# Patient Record
Sex: MALE | Race: BLACK OR AFRICAN AMERICAN | NOT HISPANIC OR LATINO | Employment: FULL TIME | URBAN - METROPOLITAN AREA
[De-identification: names, ages, dates, MRNs, and addresses within clinical notes are randomized per-mention and may not be internally consistent; named-entity substitution may affect disease eponyms.]

---

## 2017-05-03 ENCOUNTER — HOSPITAL ENCOUNTER (EMERGENCY)
Facility: HOSPITAL | Age: 37
Discharge: LEFT BEFORE DISCHARGE | End: 2017-05-03

## 2017-05-03 ENCOUNTER — HOSPITAL ENCOUNTER (EMERGENCY)
Facility: HOSPITAL | Age: 37
Discharge: HOME | End: 2017-05-03
Attending: EMERGENCY MEDICINE | Admitting: EMERGENCY MEDICINE

## 2017-05-03 VITALS
HEIGHT: 68 IN | WEIGHT: 255 LBS | DIASTOLIC BLOOD PRESSURE: 101 MMHG | TEMPERATURE: 98.7 F | BODY MASS INDEX: 38.65 KG/M2 | OXYGEN SATURATION: 99 % | HEART RATE: 78 BPM | SYSTOLIC BLOOD PRESSURE: 138 MMHG | RESPIRATION RATE: 16 BRPM

## 2017-05-03 DIAGNOSIS — R82.5 POSITIVE URINE DRUG SCREEN: ICD-10-CM

## 2017-05-03 DIAGNOSIS — R78.2 FINDING OF COCAINE IN BLOOD: ICD-10-CM

## 2017-05-03 DIAGNOSIS — R07.89 CHEST TIGHTNESS OR PRESSURE: ICD-10-CM

## 2017-05-03 DIAGNOSIS — K21.9 GASTROESOPHAGEAL REFLUX DISEASE WITHOUT ESOPHAGITIS: ICD-10-CM

## 2017-05-03 DIAGNOSIS — R00.2 HEART PALPITATIONS: Primary | ICD-10-CM

## 2017-05-03 DIAGNOSIS — F12.90 MARIJUANA USE, CONTINUOUS: ICD-10-CM

## 2017-05-03 DIAGNOSIS — IMO0001 ELEVATED BLOOD PRESSURE: ICD-10-CM

## 2017-05-03 LAB
ALBUMIN SERPL-MCNC: 4.5 G/DL (ref 3.2–4.8)
ALBUMIN/GLOB SERPL: 1.5 G/DL (ref 1.5–2.5)
ALP SERPL-CCNC: 60 U/L (ref 25–100)
ALT SERPL W P-5'-P-CCNC: 29 U/L (ref 7–40)
AMPHET+METHAMPHET UR QL: NEGATIVE
AMPHETAMINES UR QL: NEGATIVE
ANION GAP SERPL CALCULATED.3IONS-SCNC: 16 MMOL/L (ref 3–11)
AST SERPL-CCNC: 34 U/L (ref 0–33)
BARBITURATES UR QL SCN: NEGATIVE
BASOPHILS # BLD AUTO: 0.03 10*3/MM3 (ref 0–0.2)
BASOPHILS NFR BLD AUTO: 0.3 % (ref 0–1)
BENZODIAZ UR QL SCN: NEGATIVE
BILIRUB SERPL-MCNC: 1.1 MG/DL (ref 0.3–1.2)
BILIRUB UR QL STRIP: NEGATIVE
BUN BLD-MCNC: 13 MG/DL (ref 9–23)
BUN/CREAT SERPL: 13 (ref 7–25)
BUPRENORPHINE SERPL-MCNC: NEGATIVE NG/ML
CALCIUM SPEC-SCNC: 10.1 MG/DL (ref 8.7–10.4)
CANNABINOIDS SERPL QL: POSITIVE
CHLORIDE SERPL-SCNC: 100 MMOL/L (ref 99–109)
CLARITY UR: CLEAR
CO2 SERPL-SCNC: 20 MMOL/L (ref 20–31)
COCAINE UR QL: POSITIVE
COLOR UR: YELLOW
CREAT BLD-MCNC: 1 MG/DL (ref 0.6–1.3)
DEPRECATED RDW RBC AUTO: 44.5 FL (ref 37–54)
EOSINOPHIL # BLD AUTO: 0.21 10*3/MM3 (ref 0.1–0.3)
EOSINOPHIL NFR BLD AUTO: 2 % (ref 0–3)
ERYTHROCYTE [DISTWIDTH] IN BLOOD BY AUTOMATED COUNT: 14.3 % (ref 11.3–14.5)
GFR SERPL CREATININE-BSD FRML MDRD: 102 ML/MIN/1.73
GLOBULIN UR ELPH-MCNC: 3.1 GM/DL
GLUCOSE BLD-MCNC: 86 MG/DL (ref 70–100)
GLUCOSE UR STRIP-MCNC: ABNORMAL MG/DL
HCT VFR BLD AUTO: 48.1 % (ref 38.9–50.9)
HGB BLD-MCNC: 16.3 G/DL (ref 13.1–17.5)
HGB UR QL STRIP.AUTO: NEGATIVE
IMM GRANULOCYTES # BLD: 0.1 10*3/MM3 (ref 0–0.03)
IMM GRANULOCYTES NFR BLD: 1 % (ref 0–0.6)
KETONES UR QL STRIP: ABNORMAL
LEUKOCYTE ESTERASE UR QL STRIP.AUTO: NEGATIVE
LIPASE SERPL-CCNC: 37 U/L (ref 6–51)
LYMPHOCYTES # BLD AUTO: 3.89 10*3/MM3 (ref 0.6–4.8)
LYMPHOCYTES NFR BLD AUTO: 37.1 % (ref 24–44)
MCH RBC QN AUTO: 28.8 PG (ref 27–31)
MCHC RBC AUTO-ENTMCNC: 33.9 G/DL (ref 32–36)
MCV RBC AUTO: 85.1 FL (ref 80–99)
METHADONE UR QL SCN: NEGATIVE
MONOCYTES # BLD AUTO: 0.78 10*3/MM3 (ref 0–1)
MONOCYTES NFR BLD AUTO: 7.4 % (ref 0–12)
NEUTROPHILS # BLD AUTO: 5.47 10*3/MM3 (ref 1.5–8.3)
NEUTROPHILS NFR BLD AUTO: 52.2 % (ref 41–71)
NITRITE UR QL STRIP: NEGATIVE
OPIATES UR QL: NEGATIVE
OXYCODONE UR QL SCN: NEGATIVE
PCP UR QL SCN: NEGATIVE
PH UR STRIP.AUTO: 6 [PH] (ref 5–8)
PLATELET # BLD AUTO: 266 10*3/MM3 (ref 150–450)
PMV BLD AUTO: 11 FL (ref 6–12)
POTASSIUM BLD-SCNC: 4 MMOL/L (ref 3.5–5.5)
PROPOXYPH UR QL: NEGATIVE
PROT SERPL-MCNC: 7.6 G/DL (ref 5.7–8.2)
PROT UR QL STRIP: NEGATIVE
RBC # BLD AUTO: 5.65 10*6/MM3 (ref 4.2–5.76)
SODIUM BLD-SCNC: 136 MMOL/L (ref 132–146)
SP GR UR STRIP: 1.02 (ref 1–1.03)
TRICYCLICS UR QL SCN: NEGATIVE
TROPONIN I SERPL-MCNC: 0 NG/ML (ref 0–0.07)
UROBILINOGEN UR QL STRIP: ABNORMAL
WBC NRBC COR # BLD: 10.48 10*3/MM3 (ref 3.5–10.8)

## 2017-05-03 PROCEDURE — 96375 TX/PRO/DX INJ NEW DRUG ADDON: CPT

## 2017-05-03 PROCEDURE — 93005 ELECTROCARDIOGRAM TRACING: CPT | Performed by: EMERGENCY MEDICINE

## 2017-05-03 PROCEDURE — 81003 URINALYSIS AUTO W/O SCOPE: CPT | Performed by: EMERGENCY MEDICINE

## 2017-05-03 PROCEDURE — 83690 ASSAY OF LIPASE: CPT | Performed by: EMERGENCY MEDICINE

## 2017-05-03 PROCEDURE — 99283 EMERGENCY DEPT VISIT LOW MDM: CPT

## 2017-05-03 PROCEDURE — 85025 COMPLETE CBC W/AUTO DIFF WBC: CPT | Performed by: EMERGENCY MEDICINE

## 2017-05-03 PROCEDURE — 99211 OFF/OP EST MAY X REQ PHY/QHP: CPT

## 2017-05-03 PROCEDURE — 80306 DRUG TEST PRSMV INSTRMNT: CPT | Performed by: EMERGENCY MEDICINE

## 2017-05-03 PROCEDURE — 84484 ASSAY OF TROPONIN QUANT: CPT

## 2017-05-03 PROCEDURE — 25010000002 DIAZEPAM PER 5 MG: Performed by: EMERGENCY MEDICINE

## 2017-05-03 PROCEDURE — 80053 COMPREHEN METABOLIC PANEL: CPT | Performed by: EMERGENCY MEDICINE

## 2017-05-03 PROCEDURE — 96374 THER/PROPH/DIAG INJ IV PUSH: CPT

## 2017-05-03 PROCEDURE — 36415 COLL VENOUS BLD VENIPUNCTURE: CPT

## 2017-05-03 PROCEDURE — 96361 HYDRATE IV INFUSION ADD-ON: CPT

## 2017-05-03 RX ORDER — DIAZEPAM 5 MG/ML
5 INJECTION, SOLUTION INTRAMUSCULAR; INTRAVENOUS ONCE
Status: COMPLETED | OUTPATIENT
Start: 2017-05-03 | End: 2017-05-03

## 2017-05-03 RX ORDER — RANITIDINE 150 MG/1
150 TABLET ORAL 2 TIMES DAILY
Qty: 28 TABLET | Refills: 0 | Status: ON HOLD | OUTPATIENT
Start: 2017-05-03 | End: 2022-03-18

## 2017-05-03 RX ORDER — SODIUM CHLORIDE 0.9 % (FLUSH) 0.9 %
10 SYRINGE (ML) INJECTION AS NEEDED
Status: DISCONTINUED | OUTPATIENT
Start: 2017-05-03 | End: 2017-05-03 | Stop reason: HOSPADM

## 2017-05-03 RX ORDER — CIPROFLOXACIN 500 MG/1
500 TABLET, FILM COATED ORAL 2 TIMES DAILY
Status: ON HOLD | COMMUNITY
End: 2022-03-18

## 2017-05-03 RX ORDER — MAGNESIUM HYDROXIDE/ALUMINUM HYDROXICE/SIMETHICONE 120; 1200; 1200 MG/30ML; MG/30ML; MG/30ML
30 SUSPENSION ORAL ONCE
Status: COMPLETED | OUTPATIENT
Start: 2017-05-03 | End: 2017-05-03

## 2017-05-03 RX ORDER — FAMOTIDINE 10 MG/ML
20 INJECTION, SOLUTION INTRAVENOUS ONCE
Status: COMPLETED | OUTPATIENT
Start: 2017-05-03 | End: 2017-05-03

## 2017-05-03 RX ADMIN — FAMOTIDINE 20 MG: 10 INJECTION, SOLUTION INTRAVENOUS at 16:03

## 2017-05-03 RX ADMIN — LIDOCAINE HYDROCHLORIDE 15 ML: 20 SOLUTION ORAL; TOPICAL at 16:02

## 2017-05-03 RX ADMIN — DIAZEPAM 5 MG: 5 INJECTION, SOLUTION INTRAMUSCULAR; INTRAVENOUS at 16:04

## 2017-05-03 RX ADMIN — SODIUM CHLORIDE 1000 ML: 9 INJECTION, SOLUTION INTRAVENOUS at 16:01

## 2017-05-03 RX ADMIN — ALUMINUM HYDROXIDE, MAGNESIUM HYDROXIDE, AND SIMETHICONE 30 ML: 200; 200; 20 SUSPENSION ORAL at 16:01

## 2017-05-05 PROCEDURE — 99211 OFF/OP EST MAY X REQ PHY/QHP: CPT

## 2017-09-17 ENCOUNTER — HOSPITAL ENCOUNTER (EMERGENCY)
Facility: HOSPITAL | Age: 37
Discharge: HOME | End: 2017-09-17
Attending: EMERGENCY MEDICINE | Admitting: EMERGENCY MEDICINE

## 2017-09-17 ENCOUNTER — APPOINTMENT (OUTPATIENT)
Dept: GENERAL RADIOLOGY | Facility: HOSPITAL | Age: 37
End: 2017-09-17

## 2017-09-17 VITALS
HEART RATE: 76 BPM | TEMPERATURE: 96.8 F | OXYGEN SATURATION: 98 % | SYSTOLIC BLOOD PRESSURE: 114 MMHG | WEIGHT: 265 LBS | HEIGHT: 68 IN | BODY MASS INDEX: 40.16 KG/M2 | RESPIRATION RATE: 18 BRPM | DIASTOLIC BLOOD PRESSURE: 92 MMHG

## 2017-09-17 DIAGNOSIS — S29.012A UPPER BACK STRAIN, INITIAL ENCOUNTER: Primary | ICD-10-CM

## 2017-09-17 LAB
ALBUMIN SERPL-MCNC: 4.3 G/DL (ref 3.2–4.8)
ALBUMIN/GLOB SERPL: 1.5 G/DL (ref 1.5–2.5)
ALP SERPL-CCNC: 66 U/L (ref 25–100)
ALT SERPL W P-5'-P-CCNC: 25 U/L (ref 7–40)
ANION GAP SERPL CALCULATED.3IONS-SCNC: 9 MMOL/L (ref 3–11)
AST SERPL-CCNC: 27 U/L (ref 0–33)
BASOPHILS # BLD AUTO: 0.03 10*3/MM3 (ref 0–0.2)
BASOPHILS NFR BLD AUTO: 0.4 % (ref 0–1)
BILIRUB SERPL-MCNC: 0.7 MG/DL (ref 0.3–1.2)
BNP SERPL-MCNC: <2 PG/ML (ref 0–100)
BUN BLD-MCNC: 12 MG/DL (ref 9–23)
BUN/CREAT SERPL: 12 (ref 7–25)
CALCIUM SPEC-SCNC: 9.9 MG/DL (ref 8.7–10.4)
CHLORIDE SERPL-SCNC: 102 MMOL/L (ref 99–109)
CO2 SERPL-SCNC: 28 MMOL/L (ref 20–31)
CREAT BLD-MCNC: 1 MG/DL (ref 0.6–1.3)
DEPRECATED RDW RBC AUTO: 42.7 FL (ref 37–54)
EOSINOPHIL # BLD AUTO: 0.21 10*3/MM3 (ref 0–0.3)
EOSINOPHIL NFR BLD AUTO: 2.7 % (ref 0–3)
ERYTHROCYTE [DISTWIDTH] IN BLOOD BY AUTOMATED COUNT: 13.2 % (ref 11.3–14.5)
GFR SERPL CREATININE-BSD FRML MDRD: 102 ML/MIN/1.73
GLOBULIN UR ELPH-MCNC: 2.9 GM/DL
GLUCOSE BLD-MCNC: 155 MG/DL (ref 70–100)
HCT VFR BLD AUTO: 45.7 % (ref 38.9–50.9)
HGB BLD-MCNC: 15.3 G/DL (ref 13.1–17.5)
HOLD SPECIMEN: NORMAL
HOLD SPECIMEN: NORMAL
IMM GRANULOCYTES # BLD: 0.05 10*3/MM3 (ref 0–0.03)
IMM GRANULOCYTES NFR BLD: 0.7 % (ref 0–0.6)
LIPASE SERPL-CCNC: 41 U/L (ref 6–51)
LYMPHOCYTES # BLD AUTO: 3 10*3/MM3 (ref 0.6–4.8)
LYMPHOCYTES NFR BLD AUTO: 39.1 % (ref 24–44)
MCH RBC QN AUTO: 29.3 PG (ref 27–31)
MCHC RBC AUTO-ENTMCNC: 33.5 G/DL (ref 32–36)
MCV RBC AUTO: 87.4 FL (ref 80–99)
MONOCYTES # BLD AUTO: 0.46 10*3/MM3 (ref 0–1)
MONOCYTES NFR BLD AUTO: 6 % (ref 0–12)
NEUTROPHILS # BLD AUTO: 3.93 10*3/MM3 (ref 1.5–8.3)
NEUTROPHILS NFR BLD AUTO: 51.1 % (ref 41–71)
PLATELET # BLD AUTO: 245 10*3/MM3 (ref 150–450)
PMV BLD AUTO: 10.7 FL (ref 6–12)
POTASSIUM BLD-SCNC: 4 MMOL/L (ref 3.5–5.5)
PROT SERPL-MCNC: 7.2 G/DL (ref 5.7–8.2)
RBC # BLD AUTO: 5.23 10*6/MM3 (ref 4.2–5.76)
SODIUM BLD-SCNC: 139 MMOL/L (ref 132–146)
TROPONIN I SERPL-MCNC: 0 NG/ML (ref 0–0.07)
WBC NRBC COR # BLD: 7.68 10*3/MM3 (ref 3.5–10.8)
WHOLE BLOOD HOLD SPECIMEN: NORMAL
WHOLE BLOOD HOLD SPECIMEN: NORMAL

## 2017-09-17 PROCEDURE — 83690 ASSAY OF LIPASE: CPT | Performed by: EMERGENCY MEDICINE

## 2017-09-17 PROCEDURE — 80053 COMPREHEN METABOLIC PANEL: CPT | Performed by: EMERGENCY MEDICINE

## 2017-09-17 PROCEDURE — 85025 COMPLETE CBC W/AUTO DIFF WBC: CPT | Performed by: EMERGENCY MEDICINE

## 2017-09-17 PROCEDURE — 93005 ELECTROCARDIOGRAM TRACING: CPT | Performed by: EMERGENCY MEDICINE

## 2017-09-17 PROCEDURE — 99283 EMERGENCY DEPT VISIT LOW MDM: CPT

## 2017-09-17 PROCEDURE — 84484 ASSAY OF TROPONIN QUANT: CPT

## 2017-09-17 PROCEDURE — 83880 ASSAY OF NATRIURETIC PEPTIDE: CPT | Performed by: EMERGENCY MEDICINE

## 2017-09-17 PROCEDURE — 71010 HC CHEST PA OR AP: CPT

## 2017-09-17 RX ORDER — ASPIRIN 81 MG/1
324 TABLET, CHEWABLE ORAL ONCE
Status: DISCONTINUED | OUTPATIENT
Start: 2017-09-17 | End: 2017-09-17 | Stop reason: HOSPADM

## 2017-09-17 RX ORDER — SODIUM CHLORIDE 0.9 % (FLUSH) 0.9 %
10 SYRINGE (ML) INJECTION AS NEEDED
Status: DISCONTINUED | OUTPATIENT
Start: 2017-09-17 | End: 2017-09-17 | Stop reason: HOSPADM

## 2017-09-17 NOTE — ED PROVIDER NOTES
Subjective   HPI Comments: Yg Araujo is a 37 y.o. male who presents to the ED with c/o chest pain with onset 0900. The patient states that he woke up today and felt pain in the upper left side of his back. He tried laying down but then became SoA and noticed epigastric and left sided chest pain that worsened when he attempted to breathe. The chest and back pain is exacerbated with breathing, movement, talking, and laying down. He also notes of N/V with onset yesterday and chronic heartburn, but denies any fever, chills, cough, or any other complaints at this time.  He denies any recent injury. The patient has a history of diverticulitis and pancreatitis. He smokes everyday and states that he is currently trying to quit drinking alcohol.       Patient is a 37 y.o. male presenting with chest pain.   History provided by:  Patient  Chest Pain   Pain location:  L chest  Pain radiates to:  Mid back  Onset quality:  Sudden  Duration: Onset 0900.  Timing:  Constant  Progression:  Worsening  Chronicity:  New  Relieved by:  None tried  Worsened by:  Deep breathing, exertion, movement and certain positions  Ineffective treatments:  None tried  Associated symptoms: back pain, heartburn, nausea (Yesterday), orthopnea, shortness of breath and vomiting (Yesterday)    Associated symptoms: no cough and no fever        Review of Systems   Constitutional: Negative for chills and fever.   Respiratory: Positive for shortness of breath. Negative for cough.    Cardiovascular: Positive for chest pain and orthopnea.   Gastrointestinal: Positive for heartburn, nausea (Yesterday) and vomiting (Yesterday).   Musculoskeletal: Positive for back pain.   All other systems reviewed and are negative.      Past Medical History:   Diagnosis Date   • Alcohol abuse    • Diverticulitis    • GERD (gastroesophageal reflux disease)    • HLD (hyperlipidemia)    • HTN (hypertension)    • Pancreatitis    • Pancreatitis    • Polysubstance abuse        No  Known Allergies    History reviewed. No pertinent surgical history.    History reviewed. No pertinent family history.    Social History     Social History   • Marital status: Single     Spouse name: N/A   • Number of children: N/A   • Years of education: N/A     Social History Main Topics   • Smoking status: Current Every Day Smoker     Packs/day: 1.50     Types: Cigarettes   • Smokeless tobacco: None   • Alcohol use 14.4 oz/week     24 Cans of beer per week      Comment: 6 pack every other day   • Drug use: Yes     Special: Marijuana, Cocaine      Comment: daily- Hasn't done cocaine since beginning of April 2017   • Sexual activity: Not Asked     Other Topics Concern   • None     Social History Narrative         Objective   Physical Exam   Constitutional: He is oriented to person, place, and time. He appears well-developed and well-nourished. No distress.   Patient appears well hydrated.   HENT:   Head: Normocephalic and atraumatic.   Pharynx is benign   Eyes: Conjunctivae are normal. No scleral icterus.   Cardiovascular: Normal rate, regular rhythm and normal heart sounds.    Pulmonary/Chest: Effort normal and breath sounds normal. No respiratory distress.   Abdominal: Soft.   Epigastrium soreness and palpation in this area causes the patient to feel as if it is taking his breath away   Musculoskeletal: He exhibits tenderness.   Left and mid thoracic paraspinal muscle tenderness.    Lymphadenopathy:     He has no cervical adenopathy.   Neurological: He is alert and oriented to person, place, and time.   Skin: Skin is warm and dry.   Psychiatric: He has a normal mood and affect. His behavior is normal.   Nursing note and vitals reviewed.      Procedures         ED Course  ED Course     No results found for this or any previous visit (from the past 24 hour(s)).  Note: In addition to lab results from this visit, the labs listed above may include labs taken at another facility or during a different encounter within  "the last 24 hours. Please correlate lab times with ED admission and discharge times for further clarification of the services performed during this visit.    XR Chest 1 View   Final Result   Abnormal     No acute findings visualized within the chest.      THIS DOCUMENT HAS BEEN ELECTRONICALLY SIGNED BY MASOUD RIVERA MD        Vitals:    09/17/17 1852 09/17/17 2135   BP: 136/85 114/92   BP Location: Left arm    Patient Position: Sitting    Pulse: 90 76   Resp: 16 18   Temp: 98.5 °F (36.9 °C) 96.8 °F (36 °C)   TempSrc: Oral Oral   SpO2: 93% 98%   Weight: 265 lb (120 kg)    Height: 68\" (172.7 cm)      Medications - No data to display  ECG/EMG Results (last 24 hours)     Procedure Component Value Units Date/Time    ECG 12 Lead [[id number]] Collected:  09/17/17 1901     Updated:  09/17/17 1904    Narrative:       Test Reason : CHEST PAIN  Blood Pressure : **/** mmHG  Vent. Rate : 085 BPM     Atrial Rate : 085 BPM     P-R Int : 144 ms          QRS Dur : 090 ms      QT Int : 348 ms       P-R-T Axes : 029 067 020 degrees     QTc Int : 414 ms    Sinus rhythm  Nonspecific T wave abnormality  Abnormal ECG  When compared with ECG of 03-MAY-2017 14:42,  T wave abnormality now present  Confirmed by DANIEL MARSHALL MD ([id number]) on 9/17/2017 7:04:48 PM    Referred By:  ED MD           Confirmed By:DANIEL MARSHALL MD                        [facility]    Final diagnoses:   Upper back strain, initial encounter       Documentation assistance provided by ivis Lindo.  Information recorded by the scribe was done at my direction and has been verified and validated by me.     Anastacio Lindo  09/17/17 1957       Anastacio Lindo  09/17/17 2001       Daniel Marshall MD  09/20/17 1312    "

## 2017-09-18 NOTE — DISCHARGE INSTRUCTIONS
Activity as tolerated.  Hurting for 2-3 weeks not unusual.  Be sure to recheck in ER is you have worsening pain, more difficulty breathing, or any other worrisome symptom.    CONTROLLED SUBSTANCE(S) EDUCATION  Controlled Substances have been prescribed by your provider to treat your medical condition and associated symptoms. Although Controlled Substances can be effective in relieving your pain or other symptoms, they may also cause serious adverse effects. It is important that you understand how to safely and appropriately take these medications.  Proper Use  1. Carefully following instructions for use, including timing of doses, whether to take the  medication with or without food, and any foods or other medications to avoid while taking the medication;  2. If you have low or impaired vision you should wear glasses when taking the medication and not take the medication in the dark;  3. You should read the prescription container label each time to confirm the dosage;  4. You should never use the medication after the expiration date;  5. You must never share the medication with others;  6. You must not take the medication with alcohol or other sedatives;  7. You should not take the medication to help you sleep;  8. You should never break, crush or chew the medication;  9. If you have been prescribed a skin patch (transdermal), external heat, fever and exertion can increase the absorption of these products, leading to potentially fatal overdose;  10. You should immediately contact the physician?s office to report any adverse reaction and,  11. It is illegal to share, sell or give away Controlled Substances.  Driving and Work Safety  1. Controlled Substances may cause sleepiness, clouded thinking, decreased concentration, slower reflexes, or incoordination, all of which may create a danger to you and others when driving or operating certain type of machinery;  2. Avoid, if possible, driving or engaging in other  potentially dangerous work or other activities, for a specific period of time until the initial effects of the Controlled Substances no longer create such dangers; and,  3. Ingesting other substances, such as alcohol, benzodiazepines or some cold remedies, at the same time you are taking the Controlled Substances prescribed or dispensed may increase cognitive and motor impairment.  Pregnancy  If you are pregnant or nursing a baby, avoid using Controlled Substances, or use them on a minimal basis in strict accordance with your provider?s instructions.  Potential for Overdose and Response  1. The use of Controlled Substances creates a risk of respiratory depression, which may result in serious harm or death. You and others should be watchful for the following warning signs of overmedication:  ? intoxicated behavior, such as confusion, slurred speech, or stumbling;  ? feeling dizzy or faint;  ? acting very drowsy or groggy;  ? unusual snoring, gasping, or snorting during sleep;  ? and/or difficulty waking up from sleep or difficulty in staying awake.  2. Immediately call ?911? or an emergency service upon you or your caregivers observing or experiencing any of the following conditions:  ? you cannot be aroused or waken, or are unable to talk after being awakened;  ? you have shortness of breath, slow or light breathing, or stopped breathing;  ? gurgling noises coming from your mouth or throat;  ? your body is limp, seems lifeless;  ? your face is pale or clammy;  ? your fingernails or lips are turning purple or blue; and/or  ? your heartbeat is slow, unusual or stopped  Safe Storage of Controlled Substances  1. If your Controlled Substances are not stored in a safe manner there is a potential that  partners, family members or others may improperly obtain your Controlled Substances;  2. Always keep the Controlled Substances in the original container;  3. Store Controlled Substances in a locked cabinet or other secure  storage unit, that is cool, dry and out of direct sunlight, such as:  ? an existing safe;  ? a cut-proof travel bag;  ? a portable lock box designed for travel; or,  ? a locking medical box.  4. Do not store Controlled Substances in:  ? an unlocked medicine cabinet;  ? in your car; or,  ? in a refrigerator or freezer unless specifically recommended by the prescriber or  pharmacist; and  5. Immediately notify your provider if any Controlled Substances prescribed or dispensed by the provider are stolen or improperly taken by another individual.  Proper Disposal  1. It is important to safely and appropriately dispose of unused Controlled Substances that had been prescribed or dispensed by your provider;  2. Promptly dispose of unused Controlled Substances after the expiration date of the  prescription or after you no longer require the Controlled Substances to treat your medical condition;  3. In order to safely dispose of Controlled Substances, you should turn in the unused Controlled Substances as part of an approved governmental drug take-back program. The [place] Office of Drug Control Policy has a listing of [place] Permanent Drug Disposal Locations at http://www.odcp.[place].gov - click on the [place] Prescriptions Drug Drop Map and Location on the left side of the page.  4. You should not flush Controlled Substances down the toilet; and,  5. You should personally remove any identifying information, including the prescription number, from an empty Controlled Substance container and then properly dispose of the empty container.  CONSENT FOR TREATMENT WITH CONTROLLED SUBSTANCE(S)  (This is for an initial prescription)  1. Controlled Substances  Controlled Substances are prescribed to treat a variety of conditions, including the relief  of chronic pain, to provide stimulation, promote weight loss, and treat mood disorders.  Pain relief is an important medical reason to take Controlled Substances.  Controlled  Substances are drugs or chemical substances whose possession and use are  regulated under the Controlled Substances Act. The law requires that patient are informed of the risks, benefits, and alternatives of taking Controlled Substances.  2. Adverse Effects  As with any medication, there are risks and adverse effects associated with the use of  Controlled Substances. Common adverse effects of pain medicines could include, but are not limited to: sedation or sleepiness, nausea, vomiting, constipation, pruritus (itching), confusion, respiratory depression, and urinary retention. Some of these effects may make it unsafe for you to drive a vehicle, operate heavy machinery, or perform other tasks that require concentration and coordination. Excessive use of these Controlled Substances can lead to profound sedation, respiratory depression, coma, and/or death. Regarding stimulants, adverse effects could include, but are not limited to: drug dependency, neuropsychiatric symptoms such as psychosis and margarita, weight loss, cardiovascular events such as heart attack and stroke, insomnia, hypertension, and agitation. Any questions you have regarding the Controlled Substance(s) should be discussed with the prescribing provider.  3. Physical Dependence, Tolerance, and Addiction  Although uncommon when used for their clinical indications, both pain relievers and  stimulants can cause physical dependence, tolerance, and/or addiction when used for a  prolonged period. Maintenance therapy with these Controlled Substances can cause  physical dependence. This means that if these medications are abruptly stopped, or  decreased significantly over a short period of time, a patient may experience withdrawal  symptoms such as: nervousness, irritability, insomnia, sweating, abdominal cramping,  nausea, vomiting, and diarrhea. Tolerance occurs when the effects of these Controlled  Substances are decreased over a period of prolonged use making  it necessary to increase the dosage. Physical dependence and tolerance are different than addiction. Addiction is a complex disease characterized by compulsive craving or seeking and use of a substance despite its extreme negatives on a person. The risk of addiction may be increased in a patient with a history of alcoholism or other addiction.  4. Alternatives  Controlled Substances are routinely prescribed to treat moderate to severe pain or other  medical conditions. Other medicines are available to treat these conditions that are not  associated with tolerance or addiction, however, are associated with a lower level of pain  relief or stimulation. It may also be an alternative to not take any medicine to treat these  conditions, or to use alternative modalities, other than medicine to treat these conditions.  I voluntarily consent to the receipt of the above-named Controlled Substance(s) as prescribed by my provider. I have been informed of the benefits, risks, and alternatives to taking these medications. I acknowledge that I have read and understood all of the information above and I have had the opportunity to ask questions and have them answered to my satisfaction.

## 2018-03-27 ENCOUNTER — HOSPITAL ENCOUNTER (EMERGENCY)
Facility: HOSPITAL | Age: 38
Discharge: LEFT BEFORE DISCHARGE | End: 2018-03-27

## 2018-03-27 VITALS
HEART RATE: 90 BPM | BODY MASS INDEX: 39.25 KG/M2 | RESPIRATION RATE: 16 BRPM | HEIGHT: 69 IN | DIASTOLIC BLOOD PRESSURE: 80 MMHG | OXYGEN SATURATION: 97 % | SYSTOLIC BLOOD PRESSURE: 121 MMHG | WEIGHT: 265 LBS | TEMPERATURE: 98.7 F

## 2018-03-27 PROCEDURE — 99211 OFF/OP EST MAY X REQ PHY/QHP: CPT

## 2022-03-18 ENCOUNTER — HOSPITAL ENCOUNTER (INPATIENT)
Facility: HOSPITAL | Age: 42
LOS: 4 days | Discharge: HOME | End: 2022-03-22
Attending: EMERGENCY MEDICINE | Admitting: INTERNAL MEDICINE

## 2022-03-18 ENCOUNTER — APPOINTMENT (OUTPATIENT)
Dept: CT IMAGING | Facility: HOSPITAL | Age: 42
End: 2022-03-18

## 2022-03-18 DIAGNOSIS — K85.20 ALCOHOL-INDUCED ACUTE PANCREATITIS, UNSPECIFIED COMPLICATION STATUS: Primary | ICD-10-CM

## 2022-03-18 DIAGNOSIS — R10.9 ACUTE ABDOMINAL PAIN: ICD-10-CM

## 2022-03-18 LAB
ALBUMIN SERPL-MCNC: 4.3 G/DL (ref 3.5–5.2)
ALBUMIN/GLOB SERPL: 1.4 G/DL
ALP SERPL-CCNC: 62 U/L (ref 39–117)
ALT SERPL W P-5'-P-CCNC: 16 U/L (ref 1–41)
ANION GAP SERPL CALCULATED.3IONS-SCNC: 11 MMOL/L (ref 5–15)
AST SERPL-CCNC: 20 U/L (ref 1–40)
BASOPHILS # BLD AUTO: 0.06 10*3/MM3 (ref 0–0.2)
BASOPHILS NFR BLD AUTO: 0.5 % (ref 0–1.5)
BILIRUB SERPL-MCNC: 0.9 MG/DL (ref 0–1.2)
BILIRUB UR QL STRIP: NEGATIVE
BUN SERPL-MCNC: 12 MG/DL (ref 6–20)
BUN/CREAT SERPL: 15.2 (ref 7–25)
CALCIUM SPEC-SCNC: 10 MG/DL (ref 8.6–10.5)
CHLORIDE SERPL-SCNC: 99 MMOL/L (ref 98–107)
CLARITY UR: CLEAR
CO2 SERPL-SCNC: 24 MMOL/L (ref 22–29)
COLOR UR: YELLOW
CREAT SERPL-MCNC: 0.79 MG/DL (ref 0.76–1.27)
DEPRECATED RDW RBC AUTO: 41.4 FL (ref 37–54)
EGFRCR SERPLBLD CKD-EPI 2021: 114.5 ML/MIN/1.73
EOSINOPHIL # BLD AUTO: 0.15 10*3/MM3 (ref 0–0.4)
EOSINOPHIL NFR BLD AUTO: 1.3 % (ref 0.3–6.2)
ERYTHROCYTE [DISTWIDTH] IN BLOOD BY AUTOMATED COUNT: 12.9 % (ref 12.3–15.4)
FLUAV SUBTYP SPEC NAA+PROBE: NOT DETECTED
FLUBV RNA ISLT QL NAA+PROBE: NOT DETECTED
GLOBULIN UR ELPH-MCNC: 3.1 GM/DL
GLUCOSE SERPL-MCNC: 141 MG/DL (ref 65–99)
GLUCOSE UR STRIP-MCNC: NEGATIVE MG/DL
HCT VFR BLD AUTO: 44.8 % (ref 37.5–51)
HGB BLD-MCNC: 15.2 G/DL (ref 13–17.7)
HGB UR QL STRIP.AUTO: NEGATIVE
HOLD SPECIMEN: NORMAL
IMM GRANULOCYTES # BLD AUTO: 0.07 10*3/MM3 (ref 0–0.05)
IMM GRANULOCYTES NFR BLD AUTO: 0.6 % (ref 0–0.5)
KETONES UR QL STRIP: NEGATIVE
LEUKOCYTE ESTERASE UR QL STRIP.AUTO: NEGATIVE
LIPASE SERPL-CCNC: 239 U/L (ref 13–60)
LYMPHOCYTES # BLD AUTO: 4.27 10*3/MM3 (ref 0.7–3.1)
LYMPHOCYTES NFR BLD AUTO: 38 % (ref 19.6–45.3)
MCH RBC QN AUTO: 29.8 PG (ref 26.6–33)
MCHC RBC AUTO-ENTMCNC: 33.9 G/DL (ref 31.5–35.7)
MCV RBC AUTO: 87.8 FL (ref 79–97)
MONOCYTES # BLD AUTO: 0.98 10*3/MM3 (ref 0.1–0.9)
MONOCYTES NFR BLD AUTO: 8.7 % (ref 5–12)
NEUTROPHILS NFR BLD AUTO: 5.7 10*3/MM3 (ref 1.7–7)
NEUTROPHILS NFR BLD AUTO: 50.9 % (ref 42.7–76)
NITRITE UR QL STRIP: NEGATIVE
NRBC BLD AUTO-RTO: 0 /100 WBC (ref 0–0.2)
PH UR STRIP.AUTO: 5.5 [PH] (ref 5–8)
PLAT MORPH BLD: NORMAL
PLATELET # BLD AUTO: 282 10*3/MM3 (ref 140–450)
PMV BLD AUTO: 10.4 FL (ref 6–12)
POTASSIUM SERPL-SCNC: 4.2 MMOL/L (ref 3.5–5.2)
PROT SERPL-MCNC: 7.4 G/DL (ref 6–8.5)
PROT UR QL STRIP: NEGATIVE
RBC # BLD AUTO: 5.1 10*6/MM3 (ref 4.14–5.8)
RBC MORPH BLD: NORMAL
SARS-COV-2 RNA PNL SPEC NAA+PROBE: NOT DETECTED
SODIUM SERPL-SCNC: 134 MMOL/L (ref 136–145)
SP GR UR STRIP: 1.02 (ref 1–1.03)
UROBILINOGEN UR QL STRIP: NORMAL
WBC MORPH BLD: NORMAL
WBC NRBC COR # BLD: 11.23 10*3/MM3 (ref 3.4–10.8)
WHOLE BLOOD HOLD SPECIMEN: NORMAL

## 2022-03-18 PROCEDURE — 85007 BL SMEAR W/DIFF WBC COUNT: CPT | Performed by: EMERGENCY MEDICINE

## 2022-03-18 PROCEDURE — 83690 ASSAY OF LIPASE: CPT | Performed by: EMERGENCY MEDICINE

## 2022-03-18 PROCEDURE — 85025 COMPLETE CBC W/AUTO DIFF WBC: CPT | Performed by: EMERGENCY MEDICINE

## 2022-03-18 PROCEDURE — 99284 EMERGENCY DEPT VISIT MOD MDM: CPT

## 2022-03-18 PROCEDURE — 81003 URINALYSIS AUTO W/O SCOPE: CPT | Performed by: EMERGENCY MEDICINE

## 2022-03-18 PROCEDURE — 74176 CT ABD & PELVIS W/O CONTRAST: CPT

## 2022-03-18 PROCEDURE — 99221 1ST HOSP IP/OBS SF/LOW 40: CPT | Performed by: HOSPITALIST

## 2022-03-18 PROCEDURE — 25010000002 HEPARIN (PORCINE) PER 1000 UNITS: Performed by: HOSPITALIST

## 2022-03-18 PROCEDURE — 36415 COLL VENOUS BLD VENIPUNCTURE: CPT

## 2022-03-18 PROCEDURE — 80053 COMPREHEN METABOLIC PANEL: CPT | Performed by: EMERGENCY MEDICINE

## 2022-03-18 PROCEDURE — 25010000002 ONDANSETRON PER 1 MG: Performed by: EMERGENCY MEDICINE

## 2022-03-18 PROCEDURE — 25010000002 HYDROMORPHONE PER 4 MG: Performed by: HOSPITALIST

## 2022-03-18 PROCEDURE — 87636 SARSCOV2 & INF A&B AMP PRB: CPT | Performed by: HOSPITALIST

## 2022-03-18 PROCEDURE — 0 MORPHINE SULFATE (PF) 4 MG/ML SOLUTION: Performed by: EMERGENCY MEDICINE

## 2022-03-18 RX ORDER — NALOXONE HCL 0.4 MG/ML
0.4 VIAL (ML) INJECTION
Status: DISCONTINUED | OUTPATIENT
Start: 2022-03-18 | End: 2022-03-22 | Stop reason: HOSPADM

## 2022-03-18 RX ORDER — LORAZEPAM 2 MG/ML
2 INJECTION INTRAMUSCULAR
Status: DISCONTINUED | OUTPATIENT
Start: 2022-03-18 | End: 2022-03-22

## 2022-03-18 RX ORDER — HEPARIN SODIUM 5000 [USP'U]/ML
5000 INJECTION, SOLUTION INTRAVENOUS; SUBCUTANEOUS EVERY 8 HOURS SCHEDULED
Status: DISCONTINUED | OUTPATIENT
Start: 2022-03-18 | End: 2022-03-21

## 2022-03-18 RX ORDER — SODIUM CHLORIDE 0.9 % (FLUSH) 0.9 %
10 SYRINGE (ML) INJECTION EVERY 12 HOURS SCHEDULED
Status: DISCONTINUED | OUTPATIENT
Start: 2022-03-18 | End: 2022-03-22 | Stop reason: HOSPADM

## 2022-03-18 RX ORDER — HYDROMORPHONE HYDROCHLORIDE 1 MG/ML
0.5 INJECTION, SOLUTION INTRAMUSCULAR; INTRAVENOUS; SUBCUTANEOUS
Status: DISCONTINUED | OUTPATIENT
Start: 2022-03-18 | End: 2022-03-22

## 2022-03-18 RX ORDER — LORAZEPAM 1 MG/1
2 TABLET ORAL
Status: DISCONTINUED | OUTPATIENT
Start: 2022-03-18 | End: 2022-03-22

## 2022-03-18 RX ORDER — ONDANSETRON 2 MG/ML
4 INJECTION INTRAMUSCULAR; INTRAVENOUS EVERY 6 HOURS PRN
Status: DISCONTINUED | OUTPATIENT
Start: 2022-03-18 | End: 2022-03-22 | Stop reason: HOSPADM

## 2022-03-18 RX ORDER — SODIUM CHLORIDE 9 MG/ML
10 INJECTION INTRAVENOUS AS NEEDED
Status: DISCONTINUED | OUTPATIENT
Start: 2022-03-18 | End: 2022-03-22 | Stop reason: HOSPADM

## 2022-03-18 RX ORDER — DIPHENOXYLATE HYDROCHLORIDE AND ATROPINE SULFATE 2.5; .025 MG/1; MG/1
1 TABLET ORAL DAILY
Status: COMPLETED | OUTPATIENT
Start: 2022-03-19 | End: 2022-03-21

## 2022-03-18 RX ORDER — PANTOPRAZOLE SODIUM 40 MG/10ML
40 INJECTION, POWDER, LYOPHILIZED, FOR SOLUTION INTRAVENOUS
Status: DISCONTINUED | OUTPATIENT
Start: 2022-03-18 | End: 2022-03-19

## 2022-03-18 RX ORDER — SODIUM CHLORIDE, SODIUM LACTATE, POTASSIUM CHLORIDE, CALCIUM CHLORIDE 600; 310; 30; 20 MG/100ML; MG/100ML; MG/100ML; MG/100ML
100 INJECTION, SOLUTION INTRAVENOUS CONTINUOUS
Status: DISCONTINUED | OUTPATIENT
Start: 2022-03-18 | End: 2022-03-22

## 2022-03-18 RX ORDER — LORAZEPAM 2 MG/ML
1 INJECTION INTRAMUSCULAR
Status: DISCONTINUED | OUTPATIENT
Start: 2022-03-18 | End: 2022-03-22

## 2022-03-18 RX ORDER — FOLIC ACID 1 MG/1
1 TABLET ORAL DAILY
Status: COMPLETED | OUTPATIENT
Start: 2022-03-19 | End: 2022-03-21

## 2022-03-18 RX ORDER — SODIUM CHLORIDE 0.9 % (FLUSH) 0.9 %
10 SYRINGE (ML) INJECTION AS NEEDED
Status: DISCONTINUED | OUTPATIENT
Start: 2022-03-18 | End: 2022-03-22 | Stop reason: HOSPADM

## 2022-03-18 RX ORDER — MORPHINE SULFATE 4 MG/ML
4 INJECTION, SOLUTION INTRAMUSCULAR; INTRAVENOUS ONCE
Status: COMPLETED | OUTPATIENT
Start: 2022-03-18 | End: 2022-03-18

## 2022-03-18 RX ORDER — LORAZEPAM 1 MG/1
1 TABLET ORAL
Status: DISCONTINUED | OUTPATIENT
Start: 2022-03-18 | End: 2022-03-22

## 2022-03-18 RX ORDER — ONDANSETRON 2 MG/ML
4 INJECTION INTRAMUSCULAR; INTRAVENOUS ONCE
Status: COMPLETED | OUTPATIENT
Start: 2022-03-18 | End: 2022-03-18

## 2022-03-18 RX ADMIN — SODIUM CHLORIDE 1000 ML: 9 INJECTION, SOLUTION INTRAVENOUS at 06:45

## 2022-03-18 RX ADMIN — SODIUM CHLORIDE 2000 ML: 9 INJECTION, SOLUTION INTRAVENOUS at 12:13

## 2022-03-18 RX ADMIN — ONDANSETRON 4 MG: 2 INJECTION INTRAMUSCULAR; INTRAVENOUS at 06:44

## 2022-03-18 RX ADMIN — PANTOPRAZOLE SODIUM 40 MG: 40 INJECTION, POWDER, LYOPHILIZED, FOR SOLUTION INTRAVENOUS at 07:43

## 2022-03-18 RX ADMIN — Medication 10 ML: at 19:43

## 2022-03-18 RX ADMIN — MORPHINE SULFATE 4 MG: 4 INJECTION, SOLUTION INTRAMUSCULAR; INTRAVENOUS at 06:44

## 2022-03-18 RX ADMIN — SODIUM CHLORIDE, POTASSIUM CHLORIDE, SODIUM LACTATE AND CALCIUM CHLORIDE 100 ML/HR: 600; 310; 30; 20 INJECTION, SOLUTION INTRAVENOUS at 14:20

## 2022-03-18 RX ADMIN — HEPARIN SODIUM 5000 UNITS: 5000 INJECTION, SOLUTION INTRAVENOUS; SUBCUTANEOUS at 18:28

## 2022-03-18 RX ADMIN — HYDROMORPHONE HYDROCHLORIDE 0.5 MG: 1 INJECTION, SOLUTION INTRAMUSCULAR; INTRAVENOUS; SUBCUTANEOUS at 12:14

## 2022-03-18 RX ADMIN — HYDROMORPHONE HYDROCHLORIDE 0.5 MG: 1 INJECTION, SOLUTION INTRAMUSCULAR; INTRAVENOUS; SUBCUTANEOUS at 19:42

## 2022-03-18 RX ADMIN — Medication 10 ML: at 12:20

## 2022-03-18 NOTE — CASE MANAGEMENT/SOCIAL WORK
Discharge Planning Assessment  [facility]     Patient Name: Yg Araujo  MRN: [id number]  Today's Date: 3/18/2022    Admit Date: 3/18/2022     Discharge Needs Assessment     Row Name 03/18/22 1244       Living Environment    People in Home alone    Current Living Arrangements home    Potentially Unsafe Housing Conditions unable to assess    Primary Care Provided by self    Provides Primary Care For no one    Family Caregiver if Needed parent(s)    Family Caregiver Names Roslyn Waters [phone]    Quality of Family Relationships unable to assess    Able to Return to Prior Arrangements yes       Resource/Environmental Concerns    Transportation Concerns none       Transition Planning    Patient/Family Anticipates Transition to home    Transportation Anticipated family or friend will provide       Discharge Needs Assessment    Readmission Within the Last 30 Days no previous admission in last 30 days    Equipment Currently Used at Home none    Concerns to be Addressed no discharge needs identified    Equipment Needed After Discharge other (see comments)  TBD    Discharge Facility/Level of Care Needs other (see comments)  TBD               Discharge Plan     Row Name 03/18/22 0100       Plan    Plan IDP    Patient/Family in Agreement with Plan yes    Plan Comments MSW met with pt. at bedside. Pt. reports that he lives in [facility]. Pt. does not have a PCP, but reports that he would like to be set up with a [facility] provider. Pt.’s pharmacy is "[facility] on [address] [facility]. Pt. does not have health insurance currently. Pt. is independent with all ADL’s/IADL’s. No DME, O2, or HH services currently. Pt. reports that he will have transport back home when he is medically ready to d/c. Pt.’s goal is to return back home. CM will continue to follow throughout pt.’s stay.    Final Discharge Disposition Code 30 - still a patient              Continued Care and Services - Admitted Since 3/18/2022    Coordination  has not been started for this encounter.          Demographic Summary     Row Name 03/18/22 1243       General Information    Admission Type inpatient    Arrived From home    Referral Source admission list;emergency department    Reason for Consult discharge planning    Preferred Language English               Functional Status     Row Name 03/18/22 1244       Functional Status, IADL    Medications independent    Meal Preparation independent    Housekeeping independent    Laundry independent    Shopping independent       Mental Status Summary    Recent Changes in Mental Status/Cognitive Functioning unable to assess       Employment/    Employment Status unemployed               Psychosocial    No documentation.                Abuse/Neglect    No documentation.                Legal    No documentation.                Substance Abuse    No documentation.                Patient Forms    No documentation.                   CULLEN Ledezma

## 2022-03-18 NOTE — ED PROVIDER NOTES
Subjective   42-year-old male with a known previous history of alcohol use and pancreatitis who presents with complaint of upper central abdominal pain.  He reports that 2 days ago he went to a party and does admit that he drank more than usual.  He reports that he typically drinks about every other day and then drinks little bit more on weekends.  He reports yesterday morning upon awakening he had pain in his upper abdomen.  He has tried taking Rolaids and Tums without significant relief of pain the pain has continued to persist and increase since that given time.  He has a previous history of pancreatitis and reports that this feels similar.  Pain is isolated to the upper abdomen does not radiate to the chest, neck, arms, or into the lower abdomen.  He denies change in bowel function including no blood in the stool or diarrhea.  No reported change in urine function clued no dysuria, frequency, or urgency.  He does not take any anticoagulation.  No reported blood in his vomitus or stool.  No recent fever, bodies, or chills.  No reported sick contacts.  No previous surgical intervention to the abdomen.  No other acute complaints.          Review of Systems   Constitutional: Negative for chills, fatigue and fever.   HENT: Negative for congestion, ear pain, postnasal drip, sinus pressure and sore throat.    Eyes: Negative for pain, redness and visual disturbance.   Respiratory: Negative for cough, chest tightness and shortness of breath.    Cardiovascular: Negative for chest pain, palpitations and leg swelling.   Gastrointestinal: Positive for abdominal pain and nausea. Negative for anal bleeding, blood in stool, diarrhea and vomiting.   Endocrine: Negative for polydipsia and polyuria.   Genitourinary: Negative for difficulty urinating, dysuria, frequency and urgency.   Musculoskeletal: Negative for arthralgias, back pain and neck pain.   Skin: Negative for pallor and rash.   Allergic/Immunologic: Negative for  environmental allergies and immunocompromised state.   Neurological: Negative for dizziness, weakness and headaches.   Hematological: Negative for adenopathy.   Psychiatric/Behavioral: Negative for confusion, self-injury and suicidal ideas. The patient is not nervous/anxious.    All other systems reviewed and are negative.      Past Medical History:   Diagnosis Date   • Alcohol abuse    • Diverticulitis    • GERD (gastroesophageal reflux disease)    • HLD (hyperlipidemia)    • HTN (hypertension)    • Pancreatitis    • Pancreatitis    • Polysubstance abuse (HCC)        No Known Allergies    History reviewed. No pertinent surgical history.    History reviewed. No pertinent family history.    Social History     Socioeconomic History   • Marital status: Single   Tobacco Use   • Smoking status: Current Every Day Smoker     Packs/day: 1.50     Types: Cigarettes   Substance and Sexual Activity   • Alcohol use: Yes     Alcohol/week: 24.0 standard drinks     Types: 24 Cans of beer per week     Comment: 6 pack every other day   • Drug use: Yes     Types: Marijuana, Cocaine(coke)     Comment: daily- Hasn't done cocaine since beginning of April 2017           Objective   Physical Exam  Vitals and nursing note reviewed.   Constitutional:       General: He is not in acute distress.     Appearance: Normal appearance. He is well-developed. He is not toxic-appearing or diaphoretic.   HENT:      Head: Normocephalic and atraumatic.      Right Ear: External ear normal.      Left Ear: External ear normal.      Nose: Nose normal.   Eyes:      General: Lids are normal.      Pupils: Pupils are equal, round, and reactive to light.   Neck:      Trachea: No tracheal deviation.   Cardiovascular:      Rate and Rhythm: Normal rate and regular rhythm.      Pulses: No decreased pulses.      Heart sounds: Normal heart sounds. No murmur heard.    No friction rub. No gallop.   Pulmonary:      Effort: Pulmonary effort is normal. No respiratory  distress.      Breath sounds: Normal breath sounds. No decreased breath sounds, wheezing, rhonchi or rales.   Abdominal:      General: Bowel sounds are normal.      Palpations: Abdomen is soft.      Tenderness: There is generalized abdominal tenderness and tenderness in the epigastric area. There is no guarding or rebound.       Musculoskeletal:         General: No deformity. Normal range of motion.      Cervical back: Normal range of motion and neck supple.   Lymphadenopathy:      Cervical: No cervical adenopathy.   Skin:     General: Skin is warm and dry.      Findings: No rash.   Neurological:      Mental Status: He is alert and oriented to person, place, and time.      Cranial Nerves: No cranial nerve deficit.      Sensory: No sensory deficit.   Psychiatric:         Speech: Speech normal.         Behavior: Behavior normal.         Thought Content: Thought content normal.         Judgment: Judgment normal.         Procedures           ED Course                                                 MDM  Number of Diagnoses or Management Options  Acute abdominal pain: new and requires workup  Alcohol-induced acute pancreatitis, unspecified complication status: new and requires workup  Diagnosis management comments: Lipase elevated consistent with pancreatitis.  CT scan likewise consistent with pancreatitis.  Reproducible pain to palpation over the epigastric region.    The patient appears stable nontoxic and in no acute distress.       Amount and/or Complexity of Data Reviewed  Clinical lab tests: ordered and reviewed  Tests in the radiology section of CPT®: ordered and reviewed  Decide to obtain previous medical records or to obtain history from someone other than the patient: yes  Review and summarize past medical records: yes  Discuss the patient with other providers: yes  Independent visualization of images, tracings, or specimens: yes        Final diagnoses:   Alcohol-induced acute pancreatitis, unspecified  complication status   Acute abdominal pain       ED Disposition  ED Disposition     ED Disposition   Decision to Admit    Condition   --    Comment   --             No follow-up provider specified.       Medication List      No changes were made to your prescriptions during this visit.          Hubert Verdin MD  03/18/22 0760

## 2022-03-18 NOTE — H&P
[facility] Medicine Services  HISTORY AND PHYSICAL    Patient Name: Yg Araujo  : 1980  MRN: [id number]  Primary Care Physician: Jeovany, No Known  Date of admission: 3/18/2022      Subjective   Subjective     Chief Complaint:  Abdominal pain    HPI:  Yg Araujo is a 42 y.o. male that started having abdominal pain yesterday. He had n/v yesterday as well. He describes the pain as 10/10 pressure in his epigastrium with distention. No BM x 3 days. Notes that the day prior drank 6 beers and a pint of vodka.  No f/c/sweats. No dyspnea. Pain radiates to back. Had similar episode in past.      COVID Details:    Symptoms:    [] NONE [] Fever []  Cough [] Shortness of breath [] Change in taste/smell      Review of Systems   Constitutional: Positive for activity change, appetite change and fatigue.   HENT: Negative.    Respiratory: Negative.    Cardiovascular: Negative.    Gastrointestinal: Positive for abdominal distention, abdominal pain, constipation, nausea and vomiting.   Genitourinary: Negative.    Musculoskeletal: Positive for back pain.   Neurological: Negative.    Psychiatric/Behavioral: Negative.         All other systems reviewed and are negative.     Personal History     Past Medical History:   Diagnosis Date   • Alcohol abuse    • Diverticulitis    • GERD (gastroesophageal reflux disease)    • HLD (hyperlipidemia)    • HTN (hypertension)    • Pancreatitis    • Pancreatitis    • Polysubstance abuse (HCC)        History reviewed. No pertinent surgical history.    Family History:  family history is not on file. Otherwise pertinent FHx was reviewed and unremarkable.     Social History:  reports that he has been smoking cigarettes. He has been smoking about 1.50 packs per day. He does not have any smokeless tobacco history on file. He reports current alcohol use of about 24.0 standard drinks of alcohol per week. He reports current drug use. Drugs: Marijuana and  Cocaine(coke).  Social History     Social History Narrative   • Not on file       Medications:  Available home medication information reviewed.  (Not in a hospital admission)      No Known Allergies    Objective   Objective     Vital Signs:   Temp:  [98.1 °F (36.7 °C)] 98.1 °F (36.7 °C)  Heart Rate:  [94] 94  Resp:  [18] 18  BP: (148)/(103) 148/103       Physical Exam   NAD, alert and oriented  OP clear, dry MM  Neck supple  No LAD  RRR  CTAB  +BS, soft, mild-mod distention, TTP epigastrium  No c/c/e  No rashes  RUIZ  Normal affect    Result Review:  I have personally reviewed the results from the time of this admission to 3/18/2022 07:31 EDT and agree with these findings:  []  Laboratory  []  Microbiology  []  Radiology  []  EKG/Telemetry   []  Cardiology/Vascular   []  Pathology  []  Old records  []  Other:  Most notable findings include: CT reviewed      LAB RESULTS:      Lab 03/18/22  0525   WBC 11.23*   HEMOGLOBIN 15.2   HEMATOCRIT 44.8   PLATELETS 282   NEUTROS ABS 5.70   IMMATURE GRANS (ABS) 0.07*   LYMPHS ABS 4.27*   MONOS ABS 0.98*   EOS ABS 0.15   MCV 87.8         Lab 03/18/22  0525   SODIUM 134*   POTASSIUM 4.2   CHLORIDE 99   CO2 24.0   ANION GAP 11.0   BUN 12   CREATININE 0.79   EGFR 114.5   GLUCOSE 141*   CALCIUM 10.0         Lab 03/18/22  0525   TOTAL PROTEIN 7.4   ALBUMIN 4.30   GLOBULIN 3.1   ALT (SGPT) 16   AST (SGOT) 20   BILIRUBIN 0.9   ALK PHOS 62   LIPASE 239*                         Microbiology Results (last 10 days)     ** No results found for the last 240 hours. **          CT Abdomen Pelvis Without Contrast    Result Date: 3/18/2022  CT OF THE ABDOMEN AND PELVIS WITHOUT CONTRAST Clinical indication: Abdominal pain. Comparison: None. Procedure: Noncontrast CT images of the abdomen and pelvis were obtained.  CT dose lowering techniques were used, to include: automated exposure control, adjustment for patient size, and or use of iterative reconstruction. Findings: Lung Bases: Lung bases are  clear. No pleural effusion. Heart size is normal without pericardial effusion. Liver and biliary system: The liver is normal in size and configuration without focal lesion. No intra or extrahepatic biliary ductal dilatation is noted. The gallbladder is normal without stones, wall thickening or adjacent fluid. Pancreas: The pancreatic head appears thickened with adjacent hazy edema present. Spleen: The spleen is normal. Adrenals: The adrenal glands are within normal limits. Kidneys: No urolithiasis. The kidneys are symmetric in size and without hydronephrosis. Gastrointestinal: The stomach and scattered loops of small and large bowel have a nonobstructive pattern. Diverticulosis. The appendix is normal in the right lower quadrant. Mesentery and retroperitoneum: No mesenteric or retroperitoneal adenopathy. No abnormal fluid collection, mass or free air. Pelvis: The urinary bladder is moderately distended with a smooth contour. Rectum is normal. No free fluid. No deep pelvic or inguinal adenopathy. Reproductive: No acute abnormality. Body wall: Normal. Bones: No acute osseous abnormality.     Impression: Impression: 1. Findings suggestive of acute pancreatitis in the pancreatic head. Correlate with lipase level. 2. No urolithiasis or hydronephrosis. 3. Diverticulosis without acute diverticulitis. Electronically signed by:  Sunday Milligan M.D.  3/18/2022 4:56 AM Mountain Time          Assessment/Plan   Assessment & Plan     Active Hospital Problems    Diagnosis  POA   • Alcohol-induced acute pancreatitis, unspecified complication status [K85.20]  Yes       ETOH induced pancreatitis  -IVF  -clears  -pain/nausea meds    ETOH abuse/dependence  -thiamine  -IVF  -ativan per CIWA    GERD  -PPI    DVT prophylaxis: [facility]      CODE STATUS:  Full  Code Status and Medical Interventions:   Ordered at: 03/18/22 0722     Code Status (Patient has no pulse and is not breathing):    CPR (Attempt to Resuscitate)     Medical Interventions  (Patient has pulse or is breathing):    Full Support         Fritz aMrie MD  03/18/22

## 2022-03-19 LAB
ALBUMIN SERPL-MCNC: 3.2 G/DL (ref 3.5–5.2)
ALBUMIN/GLOB SERPL: 1.3 G/DL
ALP SERPL-CCNC: 52 U/L (ref 39–117)
ALT SERPL W P-5'-P-CCNC: 13 U/L (ref 1–41)
ANION GAP SERPL CALCULATED.3IONS-SCNC: 9 MMOL/L (ref 5–15)
AST SERPL-CCNC: 18 U/L (ref 1–40)
BILIRUB SERPL-MCNC: 1.2 MG/DL (ref 0–1.2)
BUN SERPL-MCNC: 9 MG/DL (ref 6–20)
BUN/CREAT SERPL: 11.7 (ref 7–25)
CALCIUM SPEC-SCNC: 8.1 MG/DL (ref 8.6–10.5)
CHLORIDE SERPL-SCNC: 103 MMOL/L (ref 98–107)
CO2 SERPL-SCNC: 24 MMOL/L (ref 22–29)
CREAT SERPL-MCNC: 0.77 MG/DL (ref 0.76–1.27)
DEPRECATED RDW RBC AUTO: 42.9 FL (ref 37–54)
EGFRCR SERPLBLD CKD-EPI 2021: 115.3 ML/MIN/1.73
ERYTHROCYTE [DISTWIDTH] IN BLOOD BY AUTOMATED COUNT: 13.1 % (ref 12.3–15.4)
GLOBULIN UR ELPH-MCNC: 2.5 GM/DL
GLUCOSE SERPL-MCNC: 140 MG/DL (ref 65–99)
HCT VFR BLD AUTO: 39.5 % (ref 37.5–51)
HGB BLD-MCNC: 13.1 G/DL (ref 13–17.7)
LIPASE SERPL-CCNC: 144 U/L (ref 13–60)
MAGNESIUM SERPL-MCNC: 1.7 MG/DL (ref 1.6–2.6)
MCH RBC QN AUTO: 29.8 PG (ref 26.6–33)
MCHC RBC AUTO-ENTMCNC: 33.2 G/DL (ref 31.5–35.7)
MCV RBC AUTO: 89.8 FL (ref 79–97)
PLATELET # BLD AUTO: 225 10*3/MM3 (ref 140–450)
PMV BLD AUTO: 11.3 FL (ref 6–12)
POTASSIUM SERPL-SCNC: 3.9 MMOL/L (ref 3.5–5.2)
PROT SERPL-MCNC: 5.7 G/DL (ref 6–8.5)
RBC # BLD AUTO: 4.4 10*6/MM3 (ref 4.14–5.8)
SODIUM SERPL-SCNC: 136 MMOL/L (ref 136–145)
WBC NRBC COR # BLD: 7.6 10*3/MM3 (ref 3.4–10.8)

## 2022-03-19 PROCEDURE — 99233 SBSQ HOSP IP/OBS HIGH 50: CPT | Performed by: INTERNAL MEDICINE

## 2022-03-19 PROCEDURE — 80053 COMPREHEN METABOLIC PANEL: CPT | Performed by: HOSPITALIST

## 2022-03-19 PROCEDURE — 83690 ASSAY OF LIPASE: CPT | Performed by: INTERNAL MEDICINE

## 2022-03-19 PROCEDURE — 85027 COMPLETE CBC AUTOMATED: CPT | Performed by: HOSPITALIST

## 2022-03-19 PROCEDURE — 83735 ASSAY OF MAGNESIUM: CPT | Performed by: INTERNAL MEDICINE

## 2022-03-19 PROCEDURE — 25010000002 HEPARIN (PORCINE) PER 1000 UNITS: Performed by: HOSPITALIST

## 2022-03-19 PROCEDURE — 25010000002 HYDROMORPHONE PER 4 MG: Performed by: HOSPITALIST

## 2022-03-19 RX ORDER — MAGNESIUM SULFATE 1 G/100ML
1 INJECTION INTRAVENOUS AS NEEDED
Status: DISCONTINUED | OUTPATIENT
Start: 2022-03-19 | End: 2022-03-22 | Stop reason: HOSPADM

## 2022-03-19 RX ORDER — MAGNESIUM SULFATE HEPTAHYDRATE 40 MG/ML
2 INJECTION, SOLUTION INTRAVENOUS AS NEEDED
Status: DISCONTINUED | OUTPATIENT
Start: 2022-03-19 | End: 2022-03-22 | Stop reason: HOSPADM

## 2022-03-19 RX ORDER — MAGNESIUM SULFATE HEPTAHYDRATE 40 MG/ML
4 INJECTION, SOLUTION INTRAVENOUS AS NEEDED
Status: DISCONTINUED | OUTPATIENT
Start: 2022-03-19 | End: 2022-03-22 | Stop reason: HOSPADM

## 2022-03-19 RX ORDER — PANTOPRAZOLE SODIUM 40 MG/10ML
40 INJECTION, POWDER, LYOPHILIZED, FOR SOLUTION INTRAVENOUS EVERY 12 HOURS SCHEDULED
Status: DISCONTINUED | OUTPATIENT
Start: 2022-03-19 | End: 2022-03-20

## 2022-03-19 RX ADMIN — HYDROMORPHONE HYDROCHLORIDE 0.5 MG: 1 INJECTION, SOLUTION INTRAMUSCULAR; INTRAVENOUS; SUBCUTANEOUS at 06:31

## 2022-03-19 RX ADMIN — HYDROMORPHONE HYDROCHLORIDE 0.5 MG: 1 INJECTION, SOLUTION INTRAMUSCULAR; INTRAVENOUS; SUBCUTANEOUS at 22:49

## 2022-03-19 RX ADMIN — HYDROMORPHONE HYDROCHLORIDE 0.5 MG: 1 INJECTION, SOLUTION INTRAMUSCULAR; INTRAVENOUS; SUBCUTANEOUS at 13:28

## 2022-03-19 RX ADMIN — HEPARIN SODIUM 5000 UNITS: 5000 INJECTION, SOLUTION INTRAVENOUS; SUBCUTANEOUS at 09:57

## 2022-03-19 RX ADMIN — PANTOPRAZOLE SODIUM 40 MG: 40 INJECTION, POWDER, LYOPHILIZED, FOR SOLUTION INTRAVENOUS at 20:29

## 2022-03-19 RX ADMIN — HEPARIN SODIUM 5000 UNITS: 5000 INJECTION, SOLUTION INTRAVENOUS; SUBCUTANEOUS at 00:14

## 2022-03-19 RX ADMIN — HYDROMORPHONE HYDROCHLORIDE 0.5 MG: 1 INJECTION, SOLUTION INTRAMUSCULAR; INTRAVENOUS; SUBCUTANEOUS at 16:54

## 2022-03-19 RX ADMIN — PANTOPRAZOLE SODIUM 40 MG: 40 INJECTION, POWDER, LYOPHILIZED, FOR SOLUTION INTRAVENOUS at 05:56

## 2022-03-19 RX ADMIN — THIAMINE HCL TAB 100 MG 100 MG: 100 TAB at 09:57

## 2022-03-19 RX ADMIN — Medication 10 ML: at 09:58

## 2022-03-19 RX ADMIN — FOLIC ACID 1 MG: 1 TABLET ORAL at 09:57

## 2022-03-19 RX ADMIN — HEPARIN SODIUM 5000 UNITS: 5000 INJECTION, SOLUTION INTRAVENOUS; SUBCUTANEOUS at 16:54

## 2022-03-19 RX ADMIN — HYDROMORPHONE HYDROCHLORIDE 0.5 MG: 1 INJECTION, SOLUTION INTRAMUSCULAR; INTRAVENOUS; SUBCUTANEOUS at 00:14

## 2022-03-19 RX ADMIN — HYDROMORPHONE HYDROCHLORIDE 0.5 MG: 1 INJECTION, SOLUTION INTRAMUSCULAR; INTRAVENOUS; SUBCUTANEOUS at 20:25

## 2022-03-19 RX ADMIN — MULTIVITAMIN TABLET 1 TABLET: TABLET at 09:57

## 2022-03-19 NOTE — PROGRESS NOTES
[facility] Medicine Services  PROGRESS NOTE    Patient Name: Yg Araujo  : 1980  MRN: [id number]    Date of Admission: 3/18/2022  Primary Care Physician: Provider, No Known    Subjective   Subjective     CC:  Pancreatitis, etoh abuse    HPI:  abd pain persists, better than at home, no better than yesterday in hospital, no current nausea. Tolerating clears.   ROS:  No dyspnea  No palpitations or chest pain  No n/v  No rash  No focal weakness    Objective   Objective     Vital Signs:   Temp:  [97.9 °F (36.6 °C)-98.5 °F (36.9 °C)] 98.5 °F (36.9 °C)  Heart Rate:  [61-77] 61  Resp:  [16-22] 16  BP: (108-130)/(71-93) 111/71     Physical Exam:  Constitutional:Alert, oriented x 3, nontoxic appearing  Psych:Normal/appropriate affect  HEENT:NCAT, oropharynx clear  Neck: neck supple, full range of motion  Neuro: Face symmetric, speech clear, equal , moves all extremities  Cardiac: RRR; No pretibial pitting edema  Resp: CTAB, normal effort  GI: abd soft, obese, epigastric tenderness to palpation, but no rebound or guarding  Skin: No extremity rash  Musculoskeletal/extremities: no cyanosis of extremities; no significant ankle edema            Results Reviewed:  LAB RESULTS:      Lab 22  0410 22  0525   WBC 7.60 11.23*   HEMOGLOBIN 13.1 15.2   HEMATOCRIT 39.5 44.8   PLATELETS 225 282   NEUTROS ABS  --  5.70   IMMATURE GRANS (ABS)  --  0.07*   LYMPHS ABS  --  4.27*   MONOS ABS  --  0.98*   EOS ABS  --  0.15   MCV 89.8 87.8         Lab 22  0410 22  0525   SODIUM 136 134*   POTASSIUM 3.9 4.2   CHLORIDE 103 99   CO2 24.0 24.0   ANION GAP 9.0 11.0   BUN 9 12   CREATININE 0.77 0.79   EGFR 115.3 114.5   GLUCOSE 140* 141*   CALCIUM 8.1* 10.0         Lab 22  0410 22  0525   TOTAL PROTEIN 5.7* 7.4   ALBUMIN 3.20* 4.30   GLOBULIN 2.5 3.1   ALT (SGPT) 13 16   AST (SGOT) 18 20   BILIRUBIN 1.2 0.9   ALK PHOS 52 62   LIPASE 144* 239*                     Brief Urine  Lab Results  (Last result in the past 365 days)      Color   Clarity   Blood   Leuk Est   Nitrite   Protein   CREAT   Urine HCG        03/18/22 1043 Yellow   Clear   Negative   Negative   Negative   Negative                 Microbiology Results Abnormal     Procedure Component Value - Date/Time    COVID PRE-OP / PRE-PROCEDURE SCREENING ORDER (NO ISOLATION) - Swab, Nasopharynx [[id number]]  (Normal) Collected: 03/18/22 0742    Lab Status: Final result Specimen: Swab from Nasopharynx Updated: 03/18/22 0813    Narrative:      The following orders were created for panel order COVID PRE-OP / PRE-PROCEDURE SCREENING ORDER (NO ISOLATION) - Swab, Nasopharynx.  Procedure                               Abnormality         Status                     ---------                               -----------         ------                     COVID-19 and FLU A/B PCR...[[id number]]  Normal              Final result                 Please view results for these tests on the individual orders.    COVID-19 and FLU A/B PCR - Swab, Nasopharynx [[id number]]  (Normal) Collected: 03/18/22 0742    Lab Status: Final result Specimen: Swab from Nasopharynx Updated: 03/18/22 0813     COVID19 Not Detected     Influenza A PCR Not Detected     Influenza B PCR Not Detected    Narrative:      Fact sheet for providers: https://www.fda.gov/media/853336/download    Fact sheet for patients: https://www.fda.gov/media/176625/download    Test performed by PCR.          CT Abdomen Pelvis Without Contrast    Result Date: 3/18/2022  CT OF THE ABDOMEN AND PELVIS WITHOUT CONTRAST Clinical indication: Abdominal pain. Comparison: None. Procedure: Noncontrast CT images of the abdomen and pelvis were obtained.  CT dose lowering techniques were used, to include: automated exposure control, adjustment for patient size, and or use of iterative reconstruction. Findings: Lung Bases: Lung bases are clear. No pleural effusion. Heart size is normal without pericardial effusion.  Liver and biliary system: The liver is normal in size and configuration without focal lesion. No intra or extrahepatic biliary ductal dilatation is noted. The gallbladder is normal without stones, wall thickening or adjacent fluid. Pancreas: The pancreatic head appears thickened with adjacent hazy edema present. Spleen: The spleen is normal. Adrenals: The adrenal glands are within normal limits. Kidneys: No urolithiasis. The kidneys are symmetric in size and without hydronephrosis. Gastrointestinal: The stomach and scattered loops of small and large bowel have a nonobstructive pattern. Diverticulosis. The appendix is normal in the right lower quadrant. Mesentery and retroperitoneum: No mesenteric or retroperitoneal adenopathy. No abnormal fluid collection, mass or free air. Pelvis: The urinary bladder is moderately distended with a smooth contour. Rectum is normal. No free fluid. No deep pelvic or inguinal adenopathy. Reproductive: No acute abnormality. Body wall: Normal. Bones: No acute osseous abnormality.     Impression: Impression: 1. Findings suggestive of acute pancreatitis in the pancreatic head. Correlate with lipase level. 2. No urolithiasis or hydronephrosis. 3. Diverticulosis without acute diverticulitis. Electronically signed by:  Sunday Milligan M.D.  3/18/2022 4:56 AM Mountain Time          I have reviewed the medications:  Scheduled Meds:thiamine, 100 mg, Oral, Daily   And  multivitamin, 1 tablet, Oral, Daily   And  folic acid, 1 mg, Oral, Daily  heparin (porcine), 5,000 Units, Subcutaneous, Q8H  pantoprazole, 40 mg, Intravenous, Q12H  sodium chloride, 10 mL, Intravenous, Q12H      Continuous Infusions:lactated ringers, 100 mL/hr, Last Rate: 100 mL/hr (03/18/22 1932)      PRN Meds:.HYDROmorphone **AND** naloxone  •  LORazepam **OR** LORazepam **OR** LORazepam **OR** LORazepam **OR** LORazepam **OR** LORazepam  •  magnesium sulfate **OR** magnesium sulfate in D5W 1g/100mL (PREMIX) **OR** magnesium  sulfate  •  ondansetron  •  ondansetron  •  Sodium Chloride (PF)  •  sodium chloride    Assessment/Plan   Assessment & Plan     Active Hospital Problems    Diagnosis  POA   • Alcohol-induced acute pancreatitis, unspecified complication status [K85.20]  Yes      Resolved Hospital Problems   No resolved problems to display.        Brief Hospital Course to date:  Yg Araujo is a 42 y.o. male daily drinker (6 pack beer, recent vodka use) and prior pancreatitis began drinking again about 6 months ago, last drink 3/16/22 evening, woke up 3/17/22 w/ significant abd pain, n/v. Ct a/p w/ pancreatic stranding, normal lft's, lipase 239    *ETOH pancreatitis, recurrent  *ETOH abuse (w/ hx prior)  *Abd pain, Nausea  *GERD  *Hyperglycemia  -ct a/p w/ pancreatic head inflammation, gb normal, lft's ok  -continue LR 100cc/hr, clears (still w/ pain), antiemetics and analgesics; needs to abstain from etoh (counseled); advance to gi soft diet soon once pain/nausea improves, possibly tomorrow  -continue thiamine, mv, folate  -has etoh sponsor, has pleasantly declined my offer to have addiction medicine see  -bid ppi (in case etoh gastritis playing role)  -ciwa protocol, although last drink 3/16/22 evening, and no signs withdrawal currently  -check a1c    Am labs: cbc,cmp,mag,lipid panel, a1c    DVT prophylaxis:  Medical DVT prophylaxis orders are present.            Disposition: I expect the patient to be discharged 1-3 days depending on clinical course    CODE STATUS:   Code Status and Medical Interventions:   Ordered at: 03/18/22 0722     Code Status (Patient has no pulse and is not breathing):    CPR (Attempt to Resuscitate)     Medical Interventions (Patient has pulse or is breathing):    Full Support       Miky Barrios MD  03/19/22

## 2022-03-19 NOTE — PLAN OF CARE
Goal Outcome Evaluation:  Plan of Care Reviewed With: patient      Patient VSS on room air and remained afebrile throughout the shift.  Currently NSR on tele.  Complaints of abdominal pain alleviated with PRN medications as ordered.  Lactated ringers continue to run at 100 mL/hr.  Patient remains A/O x4 and is up ad berny.  Continue to monitor.

## 2022-03-19 NOTE — PLAN OF CARE
Goal Outcome Evaluation:            Pt with c/o pain throughout shift resolved with dilaudid IV. Pt reports dark stools. Told pt to let staff know when he has another dark stool so we can take a look at it. Continuing to monitor.

## 2022-03-20 LAB
ALBUMIN SERPL-MCNC: 3.2 G/DL (ref 3.5–5.2)
ALBUMIN/GLOB SERPL: 1.3 G/DL
ALP SERPL-CCNC: 52 U/L (ref 39–117)
ALT SERPL W P-5'-P-CCNC: 25 U/L (ref 1–41)
ANION GAP SERPL CALCULATED.3IONS-SCNC: 9 MMOL/L (ref 5–15)
AST SERPL-CCNC: 45 U/L (ref 1–40)
BILIRUB SERPL-MCNC: 0.7 MG/DL (ref 0–1.2)
BUN SERPL-MCNC: 7 MG/DL (ref 6–20)
BUN/CREAT SERPL: 9 (ref 7–25)
CALCIUM SPEC-SCNC: 8.4 MG/DL (ref 8.6–10.5)
CHLORIDE SERPL-SCNC: 100 MMOL/L (ref 98–107)
CHOLEST SERPL-MCNC: 183 MG/DL (ref 0–200)
CO2 SERPL-SCNC: 25 MMOL/L (ref 22–29)
CREAT SERPL-MCNC: 0.78 MG/DL (ref 0.76–1.27)
EGFRCR SERPLBLD CKD-EPI 2021: 114.9 ML/MIN/1.73
GLOBULIN UR ELPH-MCNC: 2.5 GM/DL
GLUCOSE BLDC GLUCOMTR-MCNC: 145 MG/DL (ref 70–130)
GLUCOSE BLDC GLUCOMTR-MCNC: 184 MG/DL (ref 70–130)
GLUCOSE SERPL-MCNC: 142 MG/DL (ref 65–99)
HBA1C MFR BLD: 7.8 % (ref 4.8–5.6)
HDLC SERPL-MCNC: 24 MG/DL (ref 40–60)
LDLC SERPL CALC-MCNC: 79 MG/DL (ref 0–100)
LDLC/HDLC SERPL: 2.48 {RATIO}
LIPASE SERPL-CCNC: 107 U/L (ref 13–60)
MAGNESIUM SERPL-MCNC: 1.8 MG/DL (ref 1.6–2.6)
POTASSIUM SERPL-SCNC: 3.6 MMOL/L (ref 3.5–5.2)
PROT SERPL-MCNC: 5.7 G/DL (ref 6–8.5)
SODIUM SERPL-SCNC: 134 MMOL/L (ref 136–145)
TRIGL SERPL-MCNC: 498 MG/DL (ref 0–150)
VLDLC SERPL-MCNC: 80 MG/DL (ref 5–40)

## 2022-03-20 PROCEDURE — 25010000002 HYDROMORPHONE PER 4 MG: Performed by: HOSPITALIST

## 2022-03-20 PROCEDURE — 99232 SBSQ HOSP IP/OBS MODERATE 35: CPT | Performed by: NURSE PRACTITIONER

## 2022-03-20 PROCEDURE — 82962 GLUCOSE BLOOD TEST: CPT

## 2022-03-20 PROCEDURE — 25010000002 MAGNESIUM SULFATE 2 GM/50ML SOLUTION: Performed by: INTERNAL MEDICINE

## 2022-03-20 PROCEDURE — 25010000002 HEPARIN (PORCINE) PER 1000 UNITS: Performed by: HOSPITALIST

## 2022-03-20 PROCEDURE — 83036 HEMOGLOBIN GLYCOSYLATED A1C: CPT | Performed by: INTERNAL MEDICINE

## 2022-03-20 PROCEDURE — 83735 ASSAY OF MAGNESIUM: CPT | Performed by: INTERNAL MEDICINE

## 2022-03-20 PROCEDURE — 63710000001 INSULIN LISPRO (HUMAN) PER 5 UNITS: Performed by: NURSE PRACTITIONER

## 2022-03-20 PROCEDURE — 80053 COMPREHEN METABOLIC PANEL: CPT | Performed by: INTERNAL MEDICINE

## 2022-03-20 PROCEDURE — 83690 ASSAY OF LIPASE: CPT | Performed by: INTERNAL MEDICINE

## 2022-03-20 PROCEDURE — 80061 LIPID PANEL: CPT | Performed by: INTERNAL MEDICINE

## 2022-03-20 RX ORDER — POTASSIUM CHLORIDE 750 MG/1
40 CAPSULE, EXTENDED RELEASE ORAL AS NEEDED
Status: DISCONTINUED | OUTPATIENT
Start: 2022-03-20 | End: 2022-03-22 | Stop reason: HOSPADM

## 2022-03-20 RX ORDER — AMOXICILLIN 250 MG
2 CAPSULE ORAL 2 TIMES DAILY
Status: DISCONTINUED | OUTPATIENT
Start: 2022-03-20 | End: 2022-03-21

## 2022-03-20 RX ORDER — BISACODYL 5 MG/1
5 TABLET, DELAYED RELEASE ORAL DAILY PRN
Status: DISCONTINUED | OUTPATIENT
Start: 2022-03-20 | End: 2022-03-21

## 2022-03-20 RX ORDER — POTASSIUM CHLORIDE 1.5 G/1.77G
40 POWDER, FOR SOLUTION ORAL AS NEEDED
Status: DISCONTINUED | OUTPATIENT
Start: 2022-03-20 | End: 2022-03-22 | Stop reason: HOSPADM

## 2022-03-20 RX ORDER — POLYETHYLENE GLYCOL 3350 17 G/17G
17 POWDER, FOR SOLUTION ORAL DAILY PRN
Status: DISCONTINUED | OUTPATIENT
Start: 2022-03-20 | End: 2022-03-21

## 2022-03-20 RX ORDER — NICOTINE POLACRILEX 4 MG
15 LOZENGE BUCCAL
Status: DISCONTINUED | OUTPATIENT
Start: 2022-03-20 | End: 2022-03-22 | Stop reason: HOSPADM

## 2022-03-20 RX ORDER — PANTOPRAZOLE SODIUM 40 MG/1
40 TABLET, DELAYED RELEASE ORAL
Status: DISCONTINUED | OUTPATIENT
Start: 2022-03-20 | End: 2022-03-22 | Stop reason: HOSPADM

## 2022-03-20 RX ORDER — POTASSIUM CHLORIDE 7.45 MG/ML
10 INJECTION INTRAVENOUS
Status: DISCONTINUED | OUTPATIENT
Start: 2022-03-20 | End: 2022-03-22 | Stop reason: HOSPADM

## 2022-03-20 RX ORDER — OXYCODONE HYDROCHLORIDE AND ACETAMINOPHEN 5; 325 MG/1; MG/1
1 TABLET ORAL EVERY 4 HOURS PRN
Status: DISCONTINUED | OUTPATIENT
Start: 2022-03-20 | End: 2022-03-22 | Stop reason: HOSPADM

## 2022-03-20 RX ORDER — DEXTROSE MONOHYDRATE 25 G/50ML
25 INJECTION, SOLUTION INTRAVENOUS
Status: DISCONTINUED | OUTPATIENT
Start: 2022-03-20 | End: 2022-03-22 | Stop reason: HOSPADM

## 2022-03-20 RX ORDER — BISACODYL 10 MG
10 SUPPOSITORY, RECTAL RECTAL DAILY PRN
Status: DISCONTINUED | OUTPATIENT
Start: 2022-03-20 | End: 2022-03-21

## 2022-03-20 RX ADMIN — OXYCODONE HYDROCHLORIDE AND ACETAMINOPHEN 1 TABLET: 5; 325 TABLET ORAL at 20:12

## 2022-03-20 RX ADMIN — OXYCODONE HYDROCHLORIDE AND ACETAMINOPHEN 1 TABLET: 5; 325 TABLET ORAL at 12:25

## 2022-03-20 RX ADMIN — MULTIVITAMIN TABLET 1 TABLET: TABLET at 08:36

## 2022-03-20 RX ADMIN — SODIUM CHLORIDE, POTASSIUM CHLORIDE, SODIUM LACTATE AND CALCIUM CHLORIDE 100 ML/HR: 600; 310; 30; 20 INJECTION, SOLUTION INTRAVENOUS at 20:14

## 2022-03-20 RX ADMIN — HYDROMORPHONE HYDROCHLORIDE 0.5 MG: 1 INJECTION, SOLUTION INTRAMUSCULAR; INTRAVENOUS; SUBCUTANEOUS at 08:35

## 2022-03-20 RX ADMIN — SENNOSIDES AND DOCUSATE SODIUM 2 TABLET: 50; 8.6 TABLET ORAL at 22:26

## 2022-03-20 RX ADMIN — FOLIC ACID 1 MG: 1 TABLET ORAL at 08:36

## 2022-03-20 RX ADMIN — INSULIN LISPRO 2 UNITS: 100 INJECTION, SOLUTION INTRAVENOUS; SUBCUTANEOUS at 12:25

## 2022-03-20 RX ADMIN — HEPARIN SODIUM 5000 UNITS: 5000 INJECTION, SOLUTION INTRAVENOUS; SUBCUTANEOUS at 02:31

## 2022-03-20 RX ADMIN — Medication 10 ML: at 20:14

## 2022-03-20 RX ADMIN — SODIUM CHLORIDE, POTASSIUM CHLORIDE, SODIUM LACTATE AND CALCIUM CHLORIDE 100 ML/HR: 600; 310; 30; 20 INJECTION, SOLUTION INTRAVENOUS at 08:48

## 2022-03-20 RX ADMIN — HEPARIN SODIUM 5000 UNITS: 5000 INJECTION, SOLUTION INTRAVENOUS; SUBCUTANEOUS at 08:35

## 2022-03-20 RX ADMIN — PANTOPRAZOLE SODIUM 40 MG: 40 TABLET, DELAYED RELEASE ORAL at 17:34

## 2022-03-20 RX ADMIN — MAGNESIUM SULFATE HEPTAHYDRATE 2 G: 2 INJECTION, SOLUTION INTRAVENOUS at 12:26

## 2022-03-20 RX ADMIN — THIAMINE HCL TAB 100 MG 100 MG: 100 TAB at 08:36

## 2022-03-20 RX ADMIN — POTASSIUM CHLORIDE 40 MEQ: 750 CAPSULE, EXTENDED RELEASE ORAL at 20:12

## 2022-03-20 RX ADMIN — HYDROMORPHONE HYDROCHLORIDE 0.5 MG: 1 INJECTION, SOLUTION INTRAMUSCULAR; INTRAVENOUS; SUBCUTANEOUS at 02:31

## 2022-03-20 RX ADMIN — POTASSIUM CHLORIDE 40 MEQ: 750 CAPSULE, EXTENDED RELEASE ORAL at 12:35

## 2022-03-20 RX ADMIN — HEPARIN SODIUM 5000 UNITS: 5000 INJECTION, SOLUTION INTRAVENOUS; SUBCUTANEOUS at 17:34

## 2022-03-20 RX ADMIN — PANTOPRAZOLE SODIUM 40 MG: 40 INJECTION, POWDER, LYOPHILIZED, FOR SOLUTION INTRAVENOUS at 08:35

## 2022-03-20 RX ADMIN — Medication 10 ML: at 08:36

## 2022-03-20 NOTE — PROGRESS NOTES
[facility] Medicine Services  PROGRESS NOTE    Patient Name: Yg Araujo  : 1980  MRN: [id number]    Date of Admission: 3/18/2022  Primary Care Physician: Provider, No Known    Subjective   Subjective     CC:  Follow-up pancreatitis, EtOH abuse    HPI:  Patient seen resting in bed in no apparent distress.  No acute events overnight per nursing.  He continues to report significant abdominal pain.  We discussed prioritizing oral pain medications.  He has not had a bowel movement in 3 days and would like something for constipation.  He is tolerating clear liquid diet well and would like to advance diet.  No new complaints at this time.    ROS:  Gen- No fevers, chills  CV- No chest pain, palpitations  Resp- No cough, dyspnea  GI- No N/V/D, + abd pain     Objective   Objective     Vital Signs:   Temp:  [97.8 °F (36.6 °C)-99.3 °F (37.4 °C)] 98.3 °F (36.8 °C)  Heart Rate:  [67-85] 80  Resp:  [16-18] 16  BP: (113-125)/(74-95) 122/89     Physical Exam:  Constitutional: No acute distress, awake, alert  HENT: NCAT, mucous membranes moist  Respiratory: Clear to auscultation bilaterally, respiratory effort normal   Cardiovascular: RRR, no murmurs, palpable pedal pulses bilaterally, cap refill brisk   Gastrointestinal: Positive bowel sounds, soft, epigastric tenderness noted  Musculoskeletal: No BLE edema   Psychiatric: Appropriate affect, cooperative  Neurologic: Oriented x 3, moves all extremities, speech clear  Skin: warm, dry, no visible rash      Results Reviewed:  LAB RESULTS:      Lab 22  0410 22  0525   WBC 7.60 11.23*   HEMOGLOBIN 13.1 15.2   HEMATOCRIT 39.5 44.8   PLATELETS 225 282   NEUTROS ABS  --  5.70   IMMATURE GRANS (ABS)  --  0.07*   LYMPHS ABS  --  4.27*   MONOS ABS  --  0.98*   EOS ABS  --  0.15   MCV 89.8 87.8         Lab 22  0450 22  1207 22  0410 22  0525   SODIUM 134*  --  136 134*   POTASSIUM 3.6  --  3.9 4.2   CHLORIDE 100  --  103  99   CO2 25.0  --  24.0 24.0   ANION GAP 9.0  --  9.0 11.0   BUN 7  --  9 12   CREATININE 0.78  --  0.77 0.79   EGFR 114.9  --  115.3 114.5   GLUCOSE 142*  --  140* 141*   CALCIUM 8.4*  --  8.1* 10.0   MAGNESIUM 1.8 1.7  --   --    HEMOGLOBIN A1C 7.80*  --   --   --          Lab 03/20/22  0450 03/19/22  0410 03/18/22  0525   TOTAL PROTEIN 5.7* 5.7* 7.4   ALBUMIN 3.20* 3.20* 4.30   GLOBULIN 2.5 2.5 3.1   ALT (SGPT) 25 13 16   AST (SGOT) 45* 18 20   BILIRUBIN 0.7 1.2 0.9   ALK PHOS 52 52 62   LIPASE 107* 144* 239*             Lab 03/20/22  0450   CHOLESTEROL 183   LDL CHOL 79   HDL CHOL 24*   TRIGLYCERIDES 498*             Brief Urine Lab Results  (Last result in the past 365 days)      Color   Clarity   Blood   Leuk Est   Nitrite   Protein   CREAT   Urine HCG        03/18/22 1043 Yellow   Clear   Negative   Negative   Negative   Negative                 Microbiology Results Abnormal     Procedure Component Value - Date/Time    COVID PRE-OP / PRE-PROCEDURE SCREENING ORDER (NO ISOLATION) - Swab, Nasopharynx [[id number]]  (Normal) Collected: 03/18/22 0742    Lab Status: Final result Specimen: Swab from Nasopharynx Updated: 03/18/22 0813    Narrative:      The following orders were created for panel order COVID PRE-OP / PRE-PROCEDURE SCREENING ORDER (NO ISOLATION) - Swab, Nasopharynx.  Procedure                               Abnormality         Status                     ---------                               -----------         ------                     COVID-19 and FLU A/B PCR...[[id number]]  Normal              Final result                 Please view results for these tests on the individual orders.    COVID-19 and FLU A/B PCR - Swab, Nasopharynx [[id number]]  (Normal) Collected: 03/18/22 0742    Lab Status: Final result Specimen: Swab from Nasopharynx Updated: 03/18/22 0813     COVID19 Not Detected     Influenza A PCR Not Detected     Influenza B PCR Not Detected    Narrative:      Fact sheet for providers:  https://www.fda.gov/media/415477/download    Fact sheet for patients: https://www.fda.gov/media/127255/download    Test performed by PCR.          No radiology results from the last 24 hrs        I have reviewed the medications:  Scheduled Meds:thiamine, 100 mg, Oral, Daily   And  multivitamin, 1 tablet, Oral, Daily   And  folic acid, 1 mg, Oral, Daily  heparin (porcine), 5,000 Units, Subcutaneous, Q8H  pantoprazole, 40 mg, Intravenous, Q12H  sodium chloride, 10 mL, Intravenous, Q12H      Continuous Infusions:lactated ringers, 100 mL/hr, Last Rate: 100 mL/hr (03/18/22 1932)      PRN Meds:.HYDROmorphone **AND** naloxone  •  LORazepam **OR** LORazepam **OR** LORazepam **OR** LORazepam **OR** LORazepam **OR** LORazepam  •  magnesium sulfate **OR** magnesium sulfate in D5W 1g/100mL (PREMIX) **OR** magnesium sulfate  •  ondansetron  •  ondansetron  •  Sodium Chloride (PF)  •  sodium chloride    Assessment/Plan   Assessment & Plan     Active Hospital Problems    Diagnosis  POA   • Alcohol-induced acute pancreatitis, unspecified complication status [K85.20]  Yes      Resolved Hospital Problems   No resolved problems to display.        Brief Hospital Course to date:  Yg Araujo is a 42 y.o. male with a past medical history significant for alcohol abuse (6 pack beer, recent vodka use), prior pancreatitis, GERD, HTN, HLD, and polysubstance abuse who presented to the ED on 3/18/2022 due to abdominal pain. He began drinking again about 6 months ago, last drink 3/16/22 evening, woke up 3/17/22 w/ significant abd pain, n/v. Ct a/p w/ pancreatic stranding, normal lft's, lipase 239.      This patient's problems and plans were partially entered by my partner and updated as appropriate by me 03/20/22.    ETOH pancreatitis, recurrent  ETOH abuse (w/ hx prior)  Abd pain, Nausea  GERD  -ct a/p w/ pancreatic head inflammation, gb normal, lft's ok  -ciwa protocol, although last drink 3/16/22 evening, and no signs withdrawal  currently  -continue LR 100cc/hr  -Advanced to full liquid diet, advance diet as tolerated   -Added Percocet as needed for pain  -continue thiamine, mv, folate  -has etoh sponsor, declined offer to have addiction medicine see  -bid ppi (in case etoh gastritis playing role)  -Lipase improved to 107 today  -AM labs     T2DM  -Hgb A1C 7.8 on 3/20  -not on home meds   -Mod SSI for now     Dyslipidemia  -not currently on statin      DVT prophylaxis:  Medical DVT prophylaxis orders are present.      Disposition: I expect the patient to be discharged possibly tomorrow depending on clinical course.    CODE STATUS:   Code Status and Medical Interventions:   Ordered at: 03/18/22 0722     Code Status (Patient has no pulse and is not breathing):    CPR (Attempt to Resuscitate)     Medical Interventions (Patient has pulse or is breathing):    Full Support       Bhavik Schwarz, APRN  03/20/22

## 2022-03-21 LAB
ANION GAP SERPL CALCULATED.3IONS-SCNC: 7 MMOL/L (ref 5–15)
BUN SERPL-MCNC: 6 MG/DL (ref 6–20)
BUN/CREAT SERPL: 7.3 (ref 7–25)
CALCIUM SPEC-SCNC: 8.9 MG/DL (ref 8.6–10.5)
CHLORIDE SERPL-SCNC: 103 MMOL/L (ref 98–107)
CO2 SERPL-SCNC: 29 MMOL/L (ref 22–29)
CREAT SERPL-MCNC: 0.82 MG/DL (ref 0.76–1.27)
DEPRECATED RDW RBC AUTO: 40.3 FL (ref 37–54)
EGFRCR SERPLBLD CKD-EPI 2021: 113.2 ML/MIN/1.73
ERYTHROCYTE [DISTWIDTH] IN BLOOD BY AUTOMATED COUNT: 12.3 % (ref 12.3–15.4)
GLUCOSE BLDC GLUCOMTR-MCNC: 119 MG/DL (ref 70–130)
GLUCOSE BLDC GLUCOMTR-MCNC: 133 MG/DL (ref 70–130)
GLUCOSE BLDC GLUCOMTR-MCNC: 154 MG/DL (ref 70–130)
GLUCOSE SERPL-MCNC: 118 MG/DL (ref 65–99)
HCT VFR BLD AUTO: 39.5 % (ref 37.5–51)
HGB BLD-MCNC: 13.3 G/DL (ref 13–17.7)
LIPASE SERPL-CCNC: 69 U/L (ref 13–60)
MCH RBC QN AUTO: 30.4 PG (ref 26.6–33)
MCHC RBC AUTO-ENTMCNC: 33.7 G/DL (ref 31.5–35.7)
MCV RBC AUTO: 90.4 FL (ref 79–97)
PLATELET # BLD AUTO: 224 10*3/MM3 (ref 140–450)
PMV BLD AUTO: 12 FL (ref 6–12)
POTASSIUM SERPL-SCNC: 4.4 MMOL/L (ref 3.5–5.2)
RBC # BLD AUTO: 4.37 10*6/MM3 (ref 4.14–5.8)
SODIUM SERPL-SCNC: 139 MMOL/L (ref 136–145)
WBC NRBC COR # BLD: 7.42 10*3/MM3 (ref 3.4–10.8)

## 2022-03-21 PROCEDURE — 85027 COMPLETE CBC AUTOMATED: CPT | Performed by: NURSE PRACTITIONER

## 2022-03-21 PROCEDURE — 82962 GLUCOSE BLOOD TEST: CPT

## 2022-03-21 PROCEDURE — 25010000002 METHYLNALTREXONE 12 MG/0.6ML SOLUTION: Performed by: INTERNAL MEDICINE

## 2022-03-21 PROCEDURE — 83690 ASSAY OF LIPASE: CPT | Performed by: NURSE PRACTITIONER

## 2022-03-21 PROCEDURE — 63710000001 INSULIN LISPRO (HUMAN) PER 5 UNITS: Performed by: NURSE PRACTITIONER

## 2022-03-21 PROCEDURE — 25010000002 HEPARIN (PORCINE) PER 1000 UNITS: Performed by: INTERNAL MEDICINE

## 2022-03-21 PROCEDURE — 99232 SBSQ HOSP IP/OBS MODERATE 35: CPT | Performed by: INTERNAL MEDICINE

## 2022-03-21 PROCEDURE — 80048 BASIC METABOLIC PNL TOTAL CA: CPT | Performed by: INTERNAL MEDICINE

## 2022-03-21 RX ORDER — HYDROMORPHONE HYDROCHLORIDE 1 MG/ML
0.5 INJECTION, SOLUTION INTRAMUSCULAR; INTRAVENOUS; SUBCUTANEOUS ONCE
Status: DISCONTINUED | OUTPATIENT
Start: 2022-03-21 | End: 2022-03-21

## 2022-03-21 RX ORDER — BISACODYL 5 MG/1
5 TABLET, DELAYED RELEASE ORAL DAILY PRN
Status: DISCONTINUED | OUTPATIENT
Start: 2022-03-21 | End: 2022-03-22 | Stop reason: HOSPADM

## 2022-03-21 RX ORDER — POLYETHYLENE GLYCOL 3350 17 G/17G
17 POWDER, FOR SOLUTION ORAL DAILY
Status: DISCONTINUED | OUTPATIENT
Start: 2022-03-21 | End: 2022-03-22 | Stop reason: HOSPADM

## 2022-03-21 RX ORDER — HEPARIN SODIUM 5000 [USP'U]/ML
5000 INJECTION, SOLUTION INTRAVENOUS; SUBCUTANEOUS EVERY 8 HOURS
Status: DISCONTINUED | OUTPATIENT
Start: 2022-03-21 | End: 2022-03-22 | Stop reason: HOSPADM

## 2022-03-21 RX ORDER — MAGNESIUM CARB/ALUMINUM HYDROX 105-160MG
150 TABLET,CHEWABLE ORAL ONCE AS NEEDED
Status: DISCONTINUED | OUTPATIENT
Start: 2022-03-22 | End: 2022-03-22 | Stop reason: HOSPADM

## 2022-03-21 RX ORDER — BISACODYL 10 MG
10 SUPPOSITORY, RECTAL RECTAL DAILY PRN
Status: DISCONTINUED | OUTPATIENT
Start: 2022-03-21 | End: 2022-03-22 | Stop reason: HOSPADM

## 2022-03-21 RX ORDER — AMOXICILLIN 250 MG
2 CAPSULE ORAL 2 TIMES DAILY
Status: DISCONTINUED | OUTPATIENT
Start: 2022-03-21 | End: 2022-03-22 | Stop reason: HOSPADM

## 2022-03-21 RX ADMIN — OXYCODONE HYDROCHLORIDE AND ACETAMINOPHEN 1 TABLET: 5; 325 TABLET ORAL at 05:34

## 2022-03-21 RX ADMIN — SODIUM CHLORIDE, POTASSIUM CHLORIDE, SODIUM LACTATE AND CALCIUM CHLORIDE 100 ML/HR: 600; 310; 30; 20 INJECTION, SOLUTION INTRAVENOUS at 05:31

## 2022-03-21 RX ADMIN — FOLIC ACID 1 MG: 1 TABLET ORAL at 09:00

## 2022-03-21 RX ADMIN — INSULIN LISPRO 2 UNITS: 100 INJECTION, SOLUTION INTRAVENOUS; SUBCUTANEOUS at 08:46

## 2022-03-21 RX ADMIN — PANTOPRAZOLE SODIUM 40 MG: 40 TABLET, DELAYED RELEASE ORAL at 09:00

## 2022-03-21 RX ADMIN — OXYCODONE HYDROCHLORIDE AND ACETAMINOPHEN 1 TABLET: 5; 325 TABLET ORAL at 16:42

## 2022-03-21 RX ADMIN — SENNOSIDES AND DOCUSATE SODIUM 2 TABLET: 50; 8.6 TABLET ORAL at 09:00

## 2022-03-21 RX ADMIN — OXYCODONE HYDROCHLORIDE AND ACETAMINOPHEN 1 TABLET: 5; 325 TABLET ORAL at 22:11

## 2022-03-21 RX ADMIN — THIAMINE HCL TAB 100 MG 100 MG: 100 TAB at 09:00

## 2022-03-21 RX ADMIN — Medication 10 ML: at 09:01

## 2022-03-21 RX ADMIN — HEPARIN SODIUM 5000 UNITS: 5000 INJECTION, SOLUTION INTRAVENOUS; SUBCUTANEOUS at 05:31

## 2022-03-21 RX ADMIN — METHYLNALTREXONE BROMIDE 8 MG: 12 INJECTION, SOLUTION SUBCUTANEOUS at 13:29

## 2022-03-21 RX ADMIN — OXYCODONE HYDROCHLORIDE AND ACETAMINOPHEN 1 TABLET: 5; 325 TABLET ORAL at 00:20

## 2022-03-21 RX ADMIN — DOCUSATE SODIUM 50 MG AND SENNOSIDES 8.6 MG 2 TABLET: 8.6; 5 TABLET, FILM COATED ORAL at 20:46

## 2022-03-21 RX ADMIN — OXYCODONE HYDROCHLORIDE AND ACETAMINOPHEN 1 TABLET: 5; 325 TABLET ORAL at 09:28

## 2022-03-21 RX ADMIN — MULTIVITAMIN TABLET 1 TABLET: TABLET at 09:00

## 2022-03-21 RX ADMIN — PANTOPRAZOLE SODIUM 40 MG: 40 TABLET, DELAYED RELEASE ORAL at 16:42

## 2022-03-21 RX ADMIN — HEPARIN SODIUM 5000 UNITS: 5000 INJECTION, SOLUTION INTRAVENOUS; SUBCUTANEOUS at 20:46

## 2022-03-21 RX ADMIN — POLYETHYLENE GLYCOL 3350 17 G: 17 POWDER, FOR SOLUTION ORAL at 13:30

## 2022-03-21 RX ADMIN — POLYETHYLENE GLYCOL 3350 17 G: 17 POWDER, FOR SOLUTION ORAL at 00:20

## 2022-03-21 RX ADMIN — OXYCODONE HYDROCHLORIDE AND ACETAMINOPHEN 1 TABLET: 5; 325 TABLET ORAL at 20:46

## 2022-03-21 RX ADMIN — HEPARIN SODIUM 5000 UNITS: 5000 INJECTION, SOLUTION INTRAVENOUS; SUBCUTANEOUS at 13:29

## 2022-03-21 NOTE — NURSING NOTE
Pt admitted into room from ED staff. Pt vitals stable at this time. Pt reports pain at this time, See Mar. Pt critical lab lactate 2.4. Mary FERNANDEZ notified per phone call. phone order received to recheck lactate in 4 hours and order placed per Mary FERNANDEZ.

## 2022-03-21 NOTE — CASE MANAGEMENT/SOCIAL WORK
Continued Stay Note  [facility]     Patient Name: Yg Araujo  MRN: [id number]  Today's Date: 3/21/2022    Admit Date: 3/18/2022     Discharge Plan     Row Name 03/21/22 1140       Plan    Plan Cm update    Plan Comments Goal remains for patient to return home upon discharge. Currently increasing diet and giving bowel regimen. He remains independent of ADLs. CM will continue to follow - no needs identified at this time- [phone]    Final Discharge Disposition Code 01 - home or self-care               Discharge Codes    No documentation.                     Lee Mendez RN

## 2022-03-21 NOTE — PLAN OF CARE
Problem: Pain Acute  Goal: Acceptable Pain Control and Functional Ability  Outcome: Ongoing, Progressing  Intervention: Prevent or Manage Pain  Recent Flowsheet Documentation  Taken 3/20/2022 2000 by Bhavik Farfan RN  Medication Review/Management: medications reviewed  Intervention: Optimize Psychosocial Wellbeing  Recent Flowsheet Documentation  Taken 3/20/2022 2000 by Bhavik Farfan RN  Diversional Activities:   television   smartphone     Problem: Adult Inpatient Plan of Care  Goal: Plan of Care Review  Outcome: Ongoing, Progressing  Goal: Patient-Specific Goal (Individualized)  Outcome: Ongoing, Progressing  Goal: Absence of Hospital-Acquired Illness or Injury  Outcome: Ongoing, Progressing  Intervention: Identify and Manage Fall Risk  Recent Flowsheet Documentation  Taken 3/21/2022 0200 by Bhavik Farfan RN  Safety Promotion/Fall Prevention:   activity supervised   assistive device/personal items within reach   clutter free environment maintained   fall prevention program maintained   lighting adjusted   nonskid shoes/slippers when out of bed   room organization consistent   safety round/check completed  Taken 3/21/2022 0000 by Bhavik Farfan RN  Safety Promotion/Fall Prevention:   activity supervised   assistive device/personal items within reach   clutter free environment maintained   fall prevention program maintained   lighting adjusted   nonskid shoes/slippers when out of bed   room organization consistent   safety round/check completed  Taken 3/20/2022 2000 by Bhavik Farfan RN  Safety Promotion/Fall Prevention:   activity supervised   assistive device/personal items within reach   clutter free environment maintained   fall prevention program maintained   lighting adjusted   nonskid shoes/slippers when out of bed   room organization consistent   safety round/check completed  Intervention: Prevent Skin Injury  Recent Flowsheet Documentation  Taken 3/21/2022 0200 by Bhavik Farfan RN  Body  Position: position changed independently  Taken 3/21/2022 0000 by Bhavik Farfan RN  Body Position: position changed independently  Taken 3/20/2022 2000 by Bhavik Farfan RN  Body Position: position changed independently  Skin Protection:   adhesive use limited   incontinence pads utilized   tubing/devices free from skin contact   transparent dressing maintained  Intervention: Prevent and Manage VTE (Venous Thromboembolism) Risk  Recent Flowsheet Documentation  Taken 3/21/2022 0200 by Bhavik Farfan RN  Activity Management: activity adjusted per tolerance  Taken 3/21/2022 0000 by Bhavik Farfan RN  Activity Management: activity adjusted per tolerance  Taken 3/20/2022 2000 by Bhavik Farfan RN  Activity Management: activity adjusted per tolerance  Intervention: Prevent Infection  Recent Flowsheet Documentation  Taken 3/20/2022 2000 by Bhavik Farfan RN  Infection Prevention: environmental surveillance performed  Goal: Optimal Comfort and Wellbeing  Outcome: Ongoing, Progressing  Intervention: Provide Person-Centered Care  Recent Flowsheet Documentation  Taken 3/20/2022 2000 by Bhavik Farfan RN  Trust Relationship/Rapport:   care explained   emotional support provided   thoughts/feelings acknowledged  Goal: Readiness for Transition of Care  Outcome: Ongoing, Progressing   Goal Outcome Evaluation:

## 2022-03-21 NOTE — PLAN OF CARE
Goal Outcome Evaluation:  Plan of Care Reviewed With: patient           Outcome Evaluation: Pt a/o, VSS, RA, c/o pain addressed with PRN meds. Continue monitoring.

## 2022-03-21 NOTE — PROGRESS NOTES
[facility]  PROGRESS NOTE    Patient Name: Yg Araujo  : 1980  MRN: [id number]    Date of Admission: 3/18/2022  Primary Care Physician: Provider, No Known    Subjective   Subjective     CC:  Follow-up pancreatitis, EtOH abuse    HPI:  Pain overall improved, but persists, says no bm or flatus, mild abd distension. Did tolerate some food yesterday but not this morning because he doesn't like grits. No dyspnea. No chest pain    ROS:  Gen- No fevers, chills  CV- No chest pain, palpitations  Resp- No cough, dyspnea  GI- No N/V/D, + abd pain     Objective   Objective     Vital Signs:   Temp:  [97.8 °F (36.6 °C)-98.8 °F (37.1 °C)] 97.9 °F (36.6 °C)  Heart Rate:  [63-74] 68  Resp:  [16-18] 16  BP: (101-135)/(73-96) 124/89     Physical Exam:  Constitutional: No acute distress, awake, alert, nontoxic appearing sitting in chair  HENT: NCAT, mucous membranes moist  Respiratory: Clear to auscultation bilaterally, respiratory effort normal   Cardiovascular: RRR, no murmurs, palpable pedal pulses bilaterally, cap refill brisk   Gastrointestinal: abd mild distension, mild tenderness to palpation, no rebound or guarding  Musculoskeletal: No BLE edema   Psychiatric: Appropriate affect, cooperative  Neurologic: Oriented x 3, moves all extremities, speech clear  Skin: warm, dry, no visible rash      Results Reviewed:  LAB RESULTS:      Lab 22  0419 22  0410 22  0525   WBC 7.42 7.60 11.23*   HEMOGLOBIN 13.3 13.1 15.2   HEMATOCRIT 39.5 39.5 44.8   PLATELETS 224 225 282   NEUTROS ABS  --   --  5.70   IMMATURE GRANS (ABS)  --   --  0.07*   LYMPHS ABS  --   --  4.27*   MONOS ABS  --   --  0.98*   EOS ABS  --   --  0.15   MCV 90.4 89.8 87.8         Lab 22  0450 22  1207 22  0410 22  0525   SODIUM 134*  --  136 134*   POTASSIUM 3.6  --  3.9 4.2   CHLORIDE 100  --  103 99   CO2 25.0  --  24.0 24.0   ANION GAP 9.0  --  9.0 11.0   BUN 7  --  9 12    CREATININE 0.78  --  0.77 0.79   EGFR 114.9  --  115.3 114.5   GLUCOSE 142*  --  140* 141*   CALCIUM 8.4*  --  8.1* 10.0   MAGNESIUM 1.8 1.7  --   --    HEMOGLOBIN A1C 7.80*  --   --   --          Lab 03/21/22  0419 03/20/22  0450 03/19/22  0410 03/18/22  0525   TOTAL PROTEIN  --  5.7* 5.7* 7.4   ALBUMIN  --  3.20* 3.20* 4.30   GLOBULIN  --  2.5 2.5 3.1   ALT (SGPT)  --  25 13 16   AST (SGOT)  --  45* 18 20   BILIRUBIN  --  0.7 1.2 0.9   ALK PHOS  --  52 52 62   LIPASE 69* 107* 144* 239*             Lab 03/20/22  0450   CHOLESTEROL 183   LDL CHOL 79   HDL CHOL 24*   TRIGLYCERIDES 498*             Brief Urine Lab Results  (Last result in the past 365 days)      Color   Clarity   Blood   Leuk Est   Nitrite   Protein   CREAT   Urine HCG        03/18/22 1043 Yellow   Clear   Negative   Negative   Negative   Negative                 Microbiology Results Abnormal     Procedure Component Value - Date/Time    COVID PRE-OP / PRE-PROCEDURE SCREENING ORDER (NO ISOLATION) - Swab, Nasopharynx [[id number]]  (Normal) Collected: 03/18/22 0742    Lab Status: Final result Specimen: Swab from Nasopharynx Updated: 03/18/22 0813    Narrative:      The following orders were created for panel order COVID PRE-OP / PRE-PROCEDURE SCREENING ORDER (NO ISOLATION) - Swab, Nasopharynx.  Procedure                               Abnormality         Status                     ---------                               -----------         ------                     COVID-19 and FLU A/B PCR...[[id number]]  Normal              Final result                 Please view results for these tests on the individual orders.    COVID-19 and FLU A/B PCR - Swab, Nasopharynx [[id number]]  (Normal) Collected: 03/18/22 0742    Lab Status: Final result Specimen: Swab from Nasopharynx Updated: 03/18/22 0813     COVID19 Not Detected     Influenza A PCR Not Detected     Influenza B PCR Not Detected    Narrative:      Fact sheet for providers:  https://www.fda.gov/media/123217/download    Fact sheet for patients: https://www.fda.gov/media/123654/download    Test performed by PCR.          No radiology results from the last 24 hrs        I have reviewed the medications:  Scheduled Meds:heparin (porcine), 5,000 Units, Subcutaneous, Q8H  insulin lispro, 0-9 Units, Subcutaneous, TID AC  methylnaltrexone, 8 mg, Subcutaneous, Once  pantoprazole, 40 mg, Oral, BID AC  Pharmacy Meds to Bed Consult, , Does not apply, Daily  polyethylene glycol, 17 g, Oral, Daily   And  senna-docusate sodium, 2 tablet, Oral, BID  sodium chloride, 10 mL, Intravenous, Q12H      Continuous Infusions:lactated ringers, 100 mL/hr, Last Rate: 100 mL/hr (03/21/22 0531)      PRN Meds:.•  senna-docusate sodium **AND** polyethylene glycol **AND** bisacodyl **AND** bisacodyl  •  dextrose  •  dextrose  •  glucagon (human recombinant)  •  HYDROmorphone **AND** naloxone  •  LORazepam **OR** LORazepam **OR** LORazepam **OR** LORazepam **OR** LORazepam **OR** LORazepam  •  [START ON 3/22/2022] magnesium citrate  •  magnesium sulfate **OR** magnesium sulfate in D5W 1g/100mL (PREMIX) **OR** magnesium sulfate  •  ondansetron  •  ondansetron  •  oxyCODONE-acetaminophen  •  potassium chloride **OR** potassium chloride **OR** potassium chloride  •  Sodium Chloride (PF)  •  sodium chloride    Assessment/Plan   Assessment & Plan     Active Hospital Problems    Diagnosis  POA   • Alcohol-induced acute pancreatitis, unspecified complication status [K85.20]  Yes      Resolved Hospital Problems   No resolved problems to display.        Brief Hospital Course to date:  Yg Araujo is a 42 y.o. male with a past medical history significant for alcohol abuse (6 pack beer, recent vodka use), prior pancreatitis, GERD, HTN, HLD, and polysubstance abuse who presented to the ED on 3/18/2022 due to abdominal pain. He began drinking again about 6 months ago, last drink 3/16/22 evening, woke up 3/17/22 w/ significant  "abd pain, n/v. Ct a/p w/ pancreatic stranding, normal lft's, lipase 239.          ETOH pancreatitis, recurrent  ETOH abuse (w/ hx prior)  Abd pain, Nausea  GERD  -ct a/p w/ pancreatic head inflammation, gb normal, lft's ok  -ciwa protocol, although last drink 3/16/22 evening, and no signs withdrawal currently  -continue LR 100cc/hr  -Advanced to full liquid diet, advance diet as tolerated   -Added Percocet as needed for pain  -continue thiamine, mv, folate  -has etoh sponsor, declined offer to have addiction medicine see  -bid ppi (in case etoh gastritis playing role)  -Lipase has nearly normalized  -3/21/22: overall feels like pain improved but still does have some epigastric pain, didn't eat this morning because \"grits are gross\", no bm, says no flatus, worried about going home. Instructed to ambulate tid, giving bowel regimen, see how does throughout day, if continued to improve possible d/c home later today or tomorrow    Constipation  -3/21/22relistor 8mg sq x 1; scheduled senokot s 2 bid, miralax now; if no bm by 2pm start sipping flowly on mag citrate; ambulate halls    T2DM  -Hgb A1C 7.8 on 3/20  -not on home meds   -Mod SSI for now ; likely d/c home on metformin 500mg po daily, f/u pcp    Dyslipidemia  -not currently on statin    Am labs: bmp      DVT prophylaxis:  Medical DVT prophylaxis orders are present.      Disposition: I expect the patient to be discharged possibly tomorrow depending on clinical course.    CODE STATUS:   Code Status and Medical Interventions:   Ordered at: 03/18/22 0722     Code Status (Patient has no pulse and is not breathing):    CPR (Attempt to Resuscitate)     Medical Interventions (Patient has pulse or is breathing):    Full Support       Miky Barrios MD  03/21/22            "

## 2022-03-22 VITALS
SYSTOLIC BLOOD PRESSURE: 136 MMHG | RESPIRATION RATE: 16 BRPM | TEMPERATURE: 97.9 F | BODY MASS INDEX: 28.49 KG/M2 | OXYGEN SATURATION: 100 % | WEIGHT: 199 LBS | HEIGHT: 70 IN | HEART RATE: 61 BPM | DIASTOLIC BLOOD PRESSURE: 75 MMHG

## 2022-03-22 PROBLEM — Z79.4 TYPE 2 DIABETES MELLITUS WITHOUT COMPLICATION, WITH LONG-TERM CURRENT USE OF INSULIN: Status: ACTIVE | Noted: 2022-03-22

## 2022-03-22 PROBLEM — E78.1 HYPERTRIGLYCERIDEMIA: Status: ACTIVE | Noted: 2022-03-22

## 2022-03-22 PROBLEM — E11.9 TYPE 2 DIABETES MELLITUS WITHOUT COMPLICATION, WITH LONG-TERM CURRENT USE OF INSULIN (HCC): Status: ACTIVE | Noted: 2022-03-22

## 2022-03-22 LAB
ANION GAP SERPL CALCULATED.3IONS-SCNC: 10 MMOL/L (ref 5–15)
BUN SERPL-MCNC: 11 MG/DL (ref 6–20)
BUN/CREAT SERPL: 12.1 (ref 7–25)
CALCIUM SPEC-SCNC: 9.2 MG/DL (ref 8.6–10.5)
CHLORIDE SERPL-SCNC: 100 MMOL/L (ref 98–107)
CO2 SERPL-SCNC: 27 MMOL/L (ref 22–29)
CREAT SERPL-MCNC: 0.91 MG/DL (ref 0.76–1.27)
EGFRCR SERPLBLD CKD-EPI 2021: 108.6 ML/MIN/1.73
GLUCOSE BLDC GLUCOMTR-MCNC: 147 MG/DL (ref 70–130)
GLUCOSE BLDC GLUCOMTR-MCNC: 171 MG/DL (ref 70–130)
GLUCOSE SERPL-MCNC: 160 MG/DL (ref 65–99)
MAGNESIUM SERPL-MCNC: 1.7 MG/DL (ref 1.6–2.6)
POTASSIUM SERPL-SCNC: 4.5 MMOL/L (ref 3.5–5.2)
SODIUM SERPL-SCNC: 137 MMOL/L (ref 136–145)

## 2022-03-22 PROCEDURE — 83735 ASSAY OF MAGNESIUM: CPT | Performed by: INTERNAL MEDICINE

## 2022-03-22 PROCEDURE — 80048 BASIC METABOLIC PNL TOTAL CA: CPT | Performed by: INTERNAL MEDICINE

## 2022-03-22 PROCEDURE — G0108 DIAB MANAGE TRN  PER INDIV: HCPCS | Performed by: NURSE PRACTITIONER

## 2022-03-22 PROCEDURE — 0 MAGNESIUM SULFATE 4 GM/100ML SOLUTION: Performed by: INTERNAL MEDICINE

## 2022-03-22 PROCEDURE — 99239 HOSP IP/OBS DSCHRG MGMT >30: CPT | Performed by: PHYSICIAN ASSISTANT

## 2022-03-22 PROCEDURE — 25010000002 HEPARIN (PORCINE) PER 1000 UNITS: Performed by: INTERNAL MEDICINE

## 2022-03-22 PROCEDURE — 63710000001 INSULIN LISPRO (HUMAN) PER 5 UNITS: Performed by: NURSE PRACTITIONER

## 2022-03-22 PROCEDURE — 82962 GLUCOSE BLOOD TEST: CPT

## 2022-03-22 RX ORDER — PANTOPRAZOLE SODIUM 40 MG/1
40 TABLET, DELAYED RELEASE ORAL
Qty: 60 TABLET | Refills: 0 | Status: SHIPPED | OUTPATIENT
Start: 2022-03-22

## 2022-03-22 RX ORDER — ONDANSETRON 4 MG/1
4 TABLET, FILM COATED ORAL EVERY 6 HOURS PRN
Qty: 30 TABLET | Refills: 0 | Status: SHIPPED | OUTPATIENT
Start: 2022-03-22

## 2022-03-22 RX ORDER — OXYCODONE HYDROCHLORIDE AND ACETAMINOPHEN 5; 325 MG/1; MG/1
1 TABLET ORAL EVERY 6 HOURS PRN
Qty: 8 TABLET | Refills: 0 | Status: SHIPPED | OUTPATIENT
Start: 2022-03-22 | End: 2022-03-26

## 2022-03-22 RX ADMIN — OXYCODONE HYDROCHLORIDE AND ACETAMINOPHEN 1 TABLET: 5; 325 TABLET ORAL at 06:22

## 2022-03-22 RX ADMIN — PANTOPRAZOLE SODIUM 40 MG: 40 TABLET, DELAYED RELEASE ORAL at 09:24

## 2022-03-22 RX ADMIN — MAGNESIUM SULFATE HEPTAHYDRATE 4 G: 40 INJECTION, SOLUTION INTRAVENOUS at 09:35

## 2022-03-22 RX ADMIN — POLYETHYLENE GLYCOL 3350 17 G: 17 POWDER, FOR SOLUTION ORAL at 09:25

## 2022-03-22 RX ADMIN — OXYCODONE HYDROCHLORIDE AND ACETAMINOPHEN 1 TABLET: 5; 325 TABLET ORAL at 12:05

## 2022-03-22 RX ADMIN — HEPARIN SODIUM 5000 UNITS: 5000 INJECTION, SOLUTION INTRAVENOUS; SUBCUTANEOUS at 06:19

## 2022-03-22 RX ADMIN — INSULIN LISPRO 2 UNITS: 100 INJECTION, SOLUTION INTRAVENOUS; SUBCUTANEOUS at 12:29

## 2022-03-22 RX ADMIN — DOCUSATE SODIUM 50 MG AND SENNOSIDES 8.6 MG 2 TABLET: 8.6; 5 TABLET, FILM COATED ORAL at 09:25

## 2022-03-22 NOTE — PLAN OF CARE
VSS, pt having complaints of significant abdominal pain, but was relieved and is resting well after pain medicine administration. Anticipated d/c today, will continue to monitor.  Problem: Pain Acute  Goal: Acceptable Pain Control and Functional Ability  Outcome: Ongoing, Progressing  Intervention: Prevent or Manage Pain  Recent Flowsheet Documentation  Taken 3/21/2022 2200 by Hakeem Antoine RN  Medication Review/Management: medications reviewed  Taken 3/21/2022 2000 by Hakeem Antoine RN  Medication Review/Management: medications reviewed     Problem: Adult Inpatient Plan of Care  Goal: Plan of Care Review  Outcome: Ongoing, Progressing  Goal: Patient-Specific Goal (Individualized)  Outcome: Ongoing, Progressing  Goal: Absence of Hospital-Acquired Illness or Injury  Outcome: Ongoing, Progressing  Intervention: Identify and Manage Fall Risk  Recent Flowsheet Documentation  Taken 3/21/2022 2200 by Hakeem Antoine RN  Safety Promotion/Fall Prevention:   activity supervised   clutter free environment maintained   assistive device/personal items within reach   fall prevention program maintained   nonskid shoes/slippers when out of bed  Taken 3/21/2022 2000 by Hakeem Antoine RN  Safety Promotion/Fall Prevention:   activity supervised   assistive device/personal items within reach   clutter free environment maintained   fall prevention program maintained   nonskid shoes/slippers when out of bed  Intervention: Prevent Skin Injury  Recent Flowsheet Documentation  Taken 3/21/2022 2200 by Hakeem Antoine RN  Body Position: neutral head position  Taken 3/21/2022 2000 by Hakeem Antoine RN  Body Position: neutral body alignment  Intervention: Prevent and Manage VTE (Venous Thromboembolism) Risk  Recent Flowsheet Documentation  Taken 3/21/2022 2200 by Hakeem Antoine RN  Activity Management: up ad berny  Taken 3/21/2022 2000 by Hakeem Antoine RN  Activity Management: up ad berny  VTE Prevention/Management: bleeding risk factor(s)  identified  Intervention: Prevent Infection  Recent Flowsheet Documentation  Taken 3/21/2022 2200 by Hakeem Antoine RN  Infection Prevention: environmental surveillance performed  Taken 3/21/2022 2000 by Hakeem Antoine RN  Infection Prevention: environmental surveillance performed  Goal: Optimal Comfort and Wellbeing  Outcome: Ongoing, Progressing  Goal: Readiness for Transition of Care  Outcome: Ongoing, Progressing     Problem: Fluid Imbalance (Pancreatitis)  Goal: Fluid Balance  Outcome: Ongoing, Progressing     Problem: Infection (Pancreatitis)  Goal: Infection Symptom Resolution  Outcome: Ongoing, Progressing     Problem: Nutrition Impaired (Pancreatitis)  Goal: Optimal Nutrition Intake  Outcome: Ongoing, Progressing     Problem: Pain (Pancreatitis)  Goal: Acceptable Pain Control  Outcome: Ongoing, Progressing     Problem: Respiratory Compromise (Pancreatitis)  Goal: Effective Oxygenation and Ventilation  Outcome: Ongoing, Progressing  Intervention: Optimize Oxygenation and Ventilation  Recent Flowsheet Documentation  Taken 3/21/2022 2200 by Hakeem Antoine RN  Activity Management: up ad berny  Taken 3/21/2022 2000 by Hakeem Antoine RN  Activity Management: up ad berny     Problem: Alcohol Withdrawal  Goal: Alcohol Withdrawal Symptom Control  Outcome: Ongoing, Progressing     Problem: Acute Neurologic Deterioration (Alcohol Withdrawal)  Goal: Optimal Neurologic Function  Outcome: Ongoing, Progressing     Problem: Substance Misuse (Alcohol Withdrawal)  Goal: Readiness for Change Identified  Outcome: Ongoing, Progressing   Goal Outcome Evaluation:

## 2022-03-22 NOTE — CASE MANAGEMENT/SOCIAL WORK
Case Management Discharge Note      Final Note: Patient is being discharged home today. He has transportation and his new PCP appointment has already been made. He remains indepedent of ADLs - no other dc needs identified -lee [phone]         Selected Continued Care - Admitted Since 3/18/2022     Destination    No services have been selected for the patient.              Durable Medical Equipment    No services have been selected for the patient.              Dialysis/Infusion    No services have been selected for the patient.              Home Medical Care    No services have been selected for the patient.              Therapy    No services have been selected for the patient.              Community Resources    No services have been selected for the patient.              Community & DME    No services have been selected for the patient.                       Final Discharge Disposition Code: 01 - home or self-care

## 2022-03-22 NOTE — DISCHARGE SUMMARY
[facility] Medicine Services  DISCHARGE SUMMARY    Patient Name: Yg Araujo  : 1980  MRN: [id number]    Date of Admission: 3/18/2022  5:15 AM  Date of Discharge:  3/22/2022  Primary Care Physician: Provider, No Known    Hospital Course     Presenting Problem:   Alcohol-induced acute pancreatitis, unspecified complication status [K85.20]    Active Hospital Problems    Diagnosis  POA   • Type 2 diabetes mellitus without complication, with long-term current use of insulin (HCC) [E11.9, Z79.4]  Not Applicable   • Hypertriglyceridemia [E78.1]  Yes   • Alcohol-induced acute pancreatitis, unspecified complication status [K85.20]  Yes      Resolved Hospital Problems   No resolved problems to display.      Hospital Course:  Yg Araujo is a 42 y.o. male with prior history of polysubstance abuse (prior UDS positive for cocaine, opiates and and THC), alcohol abuse as well as alcoholic pancreatitis. He presented to [facility] ED on 3/18/22 with complaints of abdominal pain, nausea and vomiting. No BM x 3 days. He admitted to drinking 6 beers and a pint of vodka prior to onset of pain. He reports that he typically drinks every other day and more on the weekends.     Lipase 239. CT abdomen/pelvis showed thickening of the pancreatic head with adjacent edema, consistent with pancreatitis. He was admitted to the hospital medicine service    Acute alcoholic pancreatitis  Alcohol abuse   - Treated conservatively with IV fluids and PRN pain medication / antiemetics. Diet slowly advanced  - He declined chemical dependency team consult. He does not feel he has a problem with alcohol and intends to stop drinking. Encouraged him to consider AA meetings     Hypertriglyceridemia   - Lipid panel this admission : total cholesterol 183. HDL 24. LDL 79. VLDL 80. Triglycerides 498  - May have contributed to his pancreatitis but doubt causative  - If no improvement in triglycerides with  diet / lifestyle modifications, consider addition of fibrate therapy     New diagnosis DMII  - Hgb A1c 7.8%   - DM educator has seen. He declined dietitian consultation  - Patient has been made aware that there are outpatient DM education / dietitian resources available to him if desired. PCP can refer  - [place] on Metformin 500mg BID    Day of Discharge     HPI:   In bed. Lot of pain last night but better today. Was able to move his bowels yesterday. Tolerating diet. Feels comfortable going home today   Long discussion regarding new diagnosis of DMII - encouraged lifestyle / diet modifications and medication compliance. Will arrange PCP at [facility]     Review of Systems  Gen- No fevers, chills  CV- No chest pain, palpitations  Resp- No cough, dyspnea  GI- as above     Vital Signs:   Temp:  [97.7 °F (36.5 °C)-98.1 °F (36.7 °C)] 97.7 °F (36.5 °C)  Heart Rate:  [54-81] 63  Resp:  [14-16] 14  BP: (104-143)/() 143/98    Physical Exam:  Constitutional: No acute distress, awake, alert and conversant. Sitting upright in bed   HENT: NCAT, mucous membranes moist  Respiratory: Clear to auscultation bilaterally, respiratory effort normal   Cardiovascular: RRR, no murmurs, rubs, or gallops  Gastrointestinal: Positive bowel sounds, soft, epigastric tenderness to palpation, obese abdomen  Musculoskeletal: No bilateral ankle edema  Psychiatric: Appropriate affect, cooperative  Neurologic: Oriented x 3, moves all extremities spontaneously without focal deficits, speech clear  Skin: No rashes    Pertinent  and/or Most Recent Results     LAB RESULTS:      Lab 03/21/22  0419 03/19/22  0410 03/18/22  0525   WBC 7.42 7.60 11.23*   HEMOGLOBIN 13.3 13.1 15.2   HEMATOCRIT 39.5 39.5 44.8   PLATELETS 224 225 282   NEUTROS ABS  --   --  5.70   IMMATURE GRANS (ABS)  --   --  0.07*   LYMPHS ABS  --   --  4.27*   MONOS ABS  --   --  0.98*   EOS ABS  --   --  0.15   MCV 90.4 89.8 87.8         Lab 03/22/22  0444 03/21/22  1219 03/20/22  0450  03/19/22  1207 03/19/22  0410 03/18/22  0525   SODIUM 137 139 134*  --  136 134*   POTASSIUM 4.5 4.4 3.6  --  3.9 4.2   CHLORIDE 100 103 100  --  103 99   CO2 27.0 29.0 25.0  --  24.0 24.0   ANION GAP 10.0 7.0 9.0  --  9.0 11.0   BUN 11 6 7  --  9 12   CREATININE 0.91 0.82 0.78  --  0.77 0.79   EGFR 108.6 113.2 114.9  --  115.3 114.5   GLUCOSE 160* 118* 142*  --  140* 141*   CALCIUM 9.2 8.9 8.4*  --  8.1* 10.0   MAGNESIUM 1.7  --  1.8 1.7  --   --    HEMOGLOBIN A1C  --   --  7.80*  --   --   --          Lab 03/21/22  0419 03/20/22  0450 03/19/22  0410 03/18/22  0525   TOTAL PROTEIN  --  5.7* 5.7* 7.4   ALBUMIN  --  3.20* 3.20* 4.30   GLOBULIN  --  2.5 2.5 3.1   ALT (SGPT)  --  25 13 16   AST (SGOT)  --  45* 18 20   BILIRUBIN  --  0.7 1.2 0.9   ALK PHOS  --  52 52 62   LIPASE 69* 107* 144* 239*         Lab 03/20/22  0450   CHOLESTEROL 183   LDL CHOL 79   HDL CHOL 24*   TRIGLYCERIDES 498*     Brief Urine Lab Results  (Last result in the past 365 days)      Color   Clarity   Blood   Leuk Est   Nitrite   Protein   CREAT   Urine HCG        03/18/22 1043 Yellow   Clear   Negative   Negative   Negative   Negative               Microbiology Results (last 10 days)     Procedure Component Value - Date/Time    COVID PRE-OP / PRE-PROCEDURE SCREENING ORDER (NO ISOLATION) - Swab, Nasopharynx [[id number]]  (Normal) Collected: 03/18/22 0742    Lab Status: Final result Specimen: Swab from Nasopharynx Updated: 03/18/22 0813    Narrative:      The following orders were created for panel order COVID PRE-OP / PRE-PROCEDURE SCREENING ORDER (NO ISOLATION) - Swab, Nasopharynx.  Procedure                               Abnormality         Status                     ---------                               -----------         ------                     COVID-19 and FLU A/B PCR...[[id number]]  Normal              Final result                 Please view results for these tests on the individual orders.    COVID-19 and FLU A/B PCR - Swab,  Nasopharynx [[id number]]  (Normal) Collected: 03/18/22 0742    Lab Status: Final result Specimen: Swab from Nasopharynx Updated: 03/18/22 0813     COVID19 Not Detected     Influenza A PCR Not Detected     Influenza B PCR Not Detected    Narrative:      Fact sheet for providers: https://www.fda.gov/media/998379/download    Fact sheet for patients: https://www.fda.gov/media/313916/download    Test performed by PCR.        CT Abdomen Pelvis Without Contrast    Result Date: 3/18/2022  CT OF THE ABDOMEN AND PELVIS WITHOUT CONTRAST Clinical indication: Abdominal pain. Comparison: None. Procedure: Noncontrast CT images of the abdomen and pelvis were obtained.  CT dose lowering techniques were used, to include: automated exposure control, adjustment for patient size, and or use of iterative reconstruction. Findings: Lung Bases: Lung bases are clear. No pleural effusion. Heart size is normal without pericardial effusion. Liver and biliary system: The liver is normal in size and configuration without focal lesion. No intra or extrahepatic biliary ductal dilatation is noted. The gallbladder is normal without stones, wall thickening or adjacent fluid. Pancreas: The pancreatic head appears thickened with adjacent hazy edema present. Spleen: The spleen is normal. Adrenals: The adrenal glands are within normal limits. Kidneys: No urolithiasis. The kidneys are symmetric in size and without hydronephrosis. Gastrointestinal: The stomach and scattered loops of small and large bowel have a nonobstructive pattern. Diverticulosis. The appendix is normal in the right lower quadrant. Mesentery and retroperitoneum: No mesenteric or retroperitoneal adenopathy. No abnormal fluid collection, mass or free air. Pelvis: The urinary bladder is moderately distended with a smooth contour. Rectum is normal. No free fluid. No deep pelvic or inguinal adenopathy. Reproductive: No acute abnormality. Body wall: Normal. Bones: No acute osseous abnormality.      Impression: 1. Findings suggestive of acute pancreatitis in the pancreatic head. Correlate with lipase level. 2. No urolithiasis or hydronephrosis. 3. Diverticulosis without acute diverticulitis. Electronically signed by:  Sunday Milligan M.D.  3/18/2022 4:56 AM Mountain Time    Discharge Details        Discharge Medications      New Medications      Instructions Start Date   metFORMIN 500 MG tablet  Commonly known as: Glucophage   500 mg, Oral, 2 Times Daily With Meals      ondansetron 4 MG tablet  Commonly known as: Zofran   4 mg, Oral, Every 6 Hours PRN      pantoprazole 40 MG EC tablet  Commonly known as: PROTONIX   40 mg, Oral, 2 Times Daily Before Meals           No Known Allergies    Discharge Disposition:  Home or Self Care    Diet:  Diet Instructions     Advance Diet As Tolerated      Diet: Specialty Diet; Thin Liquids, No Restrictions; Low Fat      Discharge Diet: Specialty Diet    Fluid Consistency: Thin Liquids, No Restrictions    Specialty Diets: Low Fat        Activity:  Activity Instructions     Activity as Tolerated             CODE STATUS:    Code Status and Medical Interventions:   Ordered at: 03/18/22 0722     Code Status (Patient has no pulse and is not breathing):    CPR (Attempt to Resuscitate)     Medical Interventions (Patient has pulse or is breathing):    Full Support     No future appointments.    Danni Mata PA-C  03/22/22    Time Spent on Discharge:  I spent 40 minutes on this discharge activity which included: face-to-face encounter with the patient, reviewing the data in the system, coordination of the care with the nursing staff as well as consultants, documentation, and entering orders.

## 2022-03-22 NOTE — CONSULTS
"Diabetes Education    Patient Name:  Yg Araujo  YOB: 1980  MRN: [id number]  Admit Date:  3/18/2022    Orders received for Diabetes Education for a New Diagnosis. Mr. Araujo consented to this visit.    He reports he as been told he was \"borderline\" diabetic for years. He has a child that is also a diabetic. He does report some symptoms of increased thirst, hunger, and urination prior to this admission as well. He has not been managed on any diabetes medications. He \"does not eat a lot of sweets\" he eats meat, chicken, etc. He does report that occasionally at work he will \"eat a snack cake to get through the day\" and he drinks water or juice. He verbalizes that he is \"nervous\" about this diagnosis.     We discussed type 2 diabetes self-management, risk factors, and importance of blood glucose control to reduce complications. Target blood glucose readings and A1c goals per ADA were reviewed. Reviewed with patient current A1c 7.8% and discussed its significance. Signs, symptoms, and treatment of hyperglycemia and hypoglycemia were discussed. The Rule of 15 was discussed for treating hypoglycemia and he verbalized understanding through teach back. Lifestyle changes such as physical activity with MD approval and healthy eating were encouraged. Stressed the importance of strict blood sugar control after surgery to prevent complications such as infection and to promote healing of incision. We discussed the importance of daily foot exams and to monitor for any reddened, open, or sore areas. He is also instructed to get a dilated eye exam annual. Stressed the importance of blood sugar management to minimize long term complications such as heart disease, stroke, kidney disease, etc.     Donated One Touch Verio glucose meter provided. Instructed to check expiration date and safe storage of glucose test strips, how to prepare lancing device, obtain a sample, and perform test, safe disposal of lancets. " Testing frequency per MD instructions, provided general guidelines on target blood glucose, advised patient to discuss personal targets for glucose at next visit. He is instructed to check his blood sugar at various time to see how different foods impact his blood sugar. Record keeping discussed. He verbalized understanding using teach back method. Urged to call [phone] number on back of meter if he has any difficulties, complete the warranty card and mail. Urged patient to obtain prescriptions for test strips and lancets from provider. Encouraged pt to monitor blood sugar at home at least daily and if symptomatic and then as directed by his PCP and to call his PCP if blood sugar is trending high. Encouraged to keep record of blood glucose readings to take to follow up appointment with PCP. We discussed over the counter meter options if cost is an issue with getting necessary supplies. He is encouraged to contact our office or the meter  web-site if he has any questions or concerns about the meter.     We discussed the Metformin medication mechanism of action and potential side effects. He is encouraged to notify his PCP if the side effects do no improve with time to discuss trying an extended release option or another treatment option. We did discuss the impact that alcohol can have on his diabetes management and that drinking should be avoided. He is planning on getting back in touch with his sponsor and he wants to take better care of himself moving forward. He is encouraged to speak with his PCP about an out-patient diabetes education class or to call our office if he has questions when he gets home. He will need prescriptions for strips and lancets for the meter that was provided. Thank you for the referral.     Electronically signed by:  Katharina Goode RN EXTENDER  03/22/22 13:55 EDT

## 2022-03-23 ENCOUNTER — TRANSITIONAL CARE MANAGEMENT TELEPHONE ENCOUNTER (OUTPATIENT)
Dept: CALL CENTER | Facility: HOSPITAL | Age: 42
End: 2022-03-23

## 2022-03-23 ENCOUNTER — READMISSION MANAGEMENT (OUTPATIENT)
Dept: CALL CENTER | Facility: HOSPITAL | Age: 42
End: 2022-03-23

## 2022-03-23 NOTE — OUTREACH NOTE
Call Center TCM Note    Flowsheet Row Responses   [facility] patient discharged from? [place]   Does the patient have one of the following disease processes/diagnoses(primary or secondary)? Other   TCM attempt successful? No   Unsuccessful attempts Attempt 1          Christina De Leon LPN    3/23/2022, 10:26 EDT

## 2022-03-23 NOTE — OUTREACH NOTE
Call Center TCM Note    Flowsheet Row Responses   [facility] patient discharged from? [place]   Does the patient have one of the following disease processes/diagnoses(primary or secondary)? Other   TCM attempt successful? No   Unsuccessful attempts Attempt 2          Ursula Ramirez RN    3/23/2022, 17:22 EDT

## 2022-03-23 NOTE — OUTREACH NOTE
Call Center TCM Note    Flowsheet Row Responses   [facility] patient discharged from? [place]   Does the patient have one of the following disease processes/diagnoses(primary or secondary)? Other   TCM attempt successful? No   Unsuccessful attempts Attempt 2          Ursula Ramirez RN    3/23/2022, 17:24 EDT

## 2022-03-23 NOTE — OUTREACH NOTE
Call Center TCM Note    Flowsheet Row Responses   [facility] [place] patient discharged from? [place]   Does the patient have one of the following disease processes/diagnoses(primary or secondary)? Other   TCM attempt successful? No   Unsuccessful attempts Attempt 2          Christina De Leon LPN    3/23/2022, 17:15 EDT

## 2022-03-23 NOTE — OUTREACH NOTE
Prep Survey    Flowsheet Row Responses   [facility] patient discharged from? [place]   Is LACE score < 7 ? No   Emergency Room discharge w/ pulse ox? Yes   Eligibility [facility]   Date of Admission 03/18/22   Date of Discharge 03/22/22   Discharge Disposition Home or Self Care   Discharge diagnosis Acute alcoholic pancreatitis, New diagnosis DMII   Does the patient have one of the following disease processes/diagnoses(primary or secondary)? Other   Does the patient have Home health ordered? No   Is there a DME ordered? No   Prep survey completed? Yes          RACQUEL FRANKLIN - Registered Nurse

## 2022-03-24 ENCOUNTER — TELEPHONE (OUTPATIENT)
Dept: FAMILY MEDICINE CLINIC | Facility: CLINIC | Age: 42
End: 2022-03-24

## 2022-03-24 ENCOUNTER — TRANSITIONAL CARE MANAGEMENT TELEPHONE ENCOUNTER (OUTPATIENT)
Dept: CALL CENTER | Facility: HOSPITAL | Age: 42
End: 2022-03-24

## 2022-03-24 NOTE — OUTREACH NOTE
Call Center TCM Note    Flowsheet Row Responses   [facility] patient discharged from? [place]   Does the patient have one of the following disease processes/diagnoses(primary or secondary)? Other   TCM attempt successful? No  [No verbal consent]   Unsuccessful attempts Attempt 3   Call Status Voice mail issues   Revoked Reason Phone Issues   Call Status Comments Pt's phone is not in service,  no verbal consent   Does the patient have a primary care provider?  Yes   Does the patient have an appointment with their PCP within 7 days of discharge? Yes   Comments regarding PCP hospital fu appt on 3/28/22 at 11:15 AM   Has the patient kept scheduled appointments due by today? N/A          Shannon Whipple RN    3/24/2022, 13:08 EDT

## 2022-03-24 NOTE — TELEPHONE ENCOUNTER
Contacted patient to discuss further. He reports that his glucose did elevate to 190 while at work and was concerned, he did take his 2nd dose of Metformin and reports that he feels well as of now.     I advised him to continue to monitor glucose and follow up with PCP on 3/28.

## 2022-03-24 NOTE — TELEPHONE ENCOUNTER
Caller: Mariana Barrios    Relationship: Self    Best call back number:  [phone]    What is the best time to reach you: AS SOON AS POSSIBLE     Who are you requesting to speak with (clinical staff, provider,  specific staff member): DR BHATIA     Do you know the name of the person who called: MARIANA BARRIOS    What was the call regarding: STARTED MEDICATION YESTERDAY, METFORMIN 500 MG, AND IT GAVE HIM DIARRHEA, DID NOT TAKE THE MEDICATION AFTER BREAKFAST BECAUSE HE DID NOT WANT TO HAVE DIARRHEA AT WORK. TESTED BLOOD SUGAR AND IT WAS , HE TOOK HIS METFORMIN, HE WILL TEST HIS BLOOD SUGAR AGAIN IN FIFTEEN MINUTES. PATIENT WANTS TO KNOW WHAT TO DO IF BLOOD SUGAR IS STILL HIGH AT THAT TIME?    Do you require a callback: YES

## 2022-03-28 ENCOUNTER — OFFICE VISIT (OUTPATIENT)
Dept: FAMILY MEDICINE CLINIC | Facility: CLINIC | Age: 42
End: 2022-03-28

## 2022-03-28 VITALS
WEIGHT: 226 LBS | BODY MASS INDEX: 32.35 KG/M2 | OXYGEN SATURATION: 98 % | HEIGHT: 70 IN | HEART RATE: 80 BPM | SYSTOLIC BLOOD PRESSURE: 128 MMHG | DIASTOLIC BLOOD PRESSURE: 76 MMHG

## 2022-03-28 DIAGNOSIS — K85.20 ALCOHOL-INDUCED ACUTE PANCREATITIS, UNSPECIFIED COMPLICATION STATUS: ICD-10-CM

## 2022-03-28 DIAGNOSIS — Z79.4 TYPE 2 DIABETES MELLITUS WITHOUT COMPLICATION, WITH LONG-TERM CURRENT USE OF INSULIN: Primary | ICD-10-CM

## 2022-03-28 DIAGNOSIS — F10.10 ALCOHOL ABUSE: ICD-10-CM

## 2022-03-28 DIAGNOSIS — E11.9 TYPE 2 DIABETES MELLITUS WITHOUT COMPLICATION, WITH LONG-TERM CURRENT USE OF INSULIN: Primary | ICD-10-CM

## 2022-03-28 DIAGNOSIS — E78.1 HYPERTRIGLYCERIDEMIA: ICD-10-CM

## 2022-03-28 PROCEDURE — 82043 UR ALBUMIN QUANTITATIVE: CPT | Performed by: INTERNAL MEDICINE

## 2022-03-28 PROCEDURE — 99214 OFFICE O/P EST MOD 30 MIN: CPT | Performed by: INTERNAL MEDICINE

## 2022-03-28 RX ORDER — METFORMIN HYDROCHLORIDE 500 MG/1
500 TABLET, EXTENDED RELEASE ORAL
Qty: 60 TABLET | Refills: 9 | Status: SHIPPED | OUTPATIENT
Start: 2022-03-28

## 2022-03-28 NOTE — ASSESSMENT & PLAN NOTE
Diabetes is improving with treatment.   Reminded to bring in blood sugar diary at next visit.  Dietary recommendations for ADA diet.  Regular aerobic exercise.  Discussed ways to avoid symptomatic hypoglycemia.  Discussed foot care.  Reminded to get yearly retinal exam.  Medication changes per orders.  Diabetes will be reassessed in 3 months.

## 2022-03-28 NOTE — PROGRESS NOTES
Transitional Care Follow Up Visit  Subjective     Yg Araujo is a 42 y.o. male who presents for a transitional care management visit.    Within 48 business hours after discharge our office contacted him via telephone to coordinate his care and needs.      I reviewed and discussed the details of that call along with the discharge summary, hospital problems, inpatient lab results, inpatient diagnostic studies, and consultation reports with Yg.     Current outpatient and discharge medications have been reconciled for the patient.  Reviewed by: Tang Rios MD      Date of TCM Phone Call 3/23/2022   [facility]   Date of Admission 3/18/2022   Date of Discharge 3/22/2022   Discharge Disposition Home or Self Care     Risk for Readmission (LACE) Score: 7 (3/22/2022  6:01 AM)      History of Present Illness   Course During Hospital Stay:      Yg Araujo is a 42 y.o. male with prior history of polysubstance abuse (prior UDS positive for cocaine, opiates and and THC), alcohol abuse as well as alcoholic pancreatitis. He presented to [facility] ED on 3/18/22 with complaints of abdominal pain, nausea and vomiting. No BM x 3 days. He admitted to drinking 6 beers and a pint of vodka prior to onset of pain. He reports that he typically drinks every other day and more on the weekends.      Lipase 239. CT abdomen/pelvis showed thickening of the pancreatic head with adjacent edema, consistent with pancreatitis. He was admitted to the hospital medicine service     Acute alcoholic pancreatitis  Alcohol abuse   - Treated conservatively with IV fluids and PRN pain medication / antiemetics. Diet slowly advanced  - He declined chemical dependency team consult. He does not feel he has a problem with alcohol and intends to stop drinking. Encouraged him to consider AA meetings      Hypertriglyceridemia   - Lipid panel this admission : total cholesterol 183. HDL 24. LDL 79. VLDL 80.  Triglycerides 498  - May have contributed to his pancreatitis but doubt causative  - If no improvement in triglycerides with diet / lifestyle modifications, consider addition of fibrate therapy      New diagnosis DMII    - Hgb A1c 7.8%   - DM educator has seen. He declined dietitian consultation  - Patient has been made aware that there are outpatient DM education / dietitian resources available to him if desired. PCP can refer  - DC on Metformin 500mg BID  The following portions of the patient's history were reviewed and updated as appropriate:   He  has a past medical history of Alcohol abuse, Diverticulitis, GERD (gastroesophageal reflux disease), HLD (hyperlipidemia), HTN (hypertension), Pancreatitis, Pancreatitis, and Polysubstance abuse (HCC).  He does not have any pertinent problems on file.  He  has no past surgical history on file.  His family history is not on file.  He  reports that he has been smoking cigarettes. He started smoking about 27 years ago. He has been smoking about 1.50 packs per day. He has never used smokeless tobacco. He reports current alcohol use of about 24.0 standard drinks of alcohol per week. He reports current drug use. Drugs: Marijuana and Cocaine(coke).       Current Outpatient Medications   Medication Sig Dispense Refill   • ondansetron (Zofran) 4 MG tablet Take 1 tablet by mouth Every 6 (Six) Hours As Needed for Nausea or Vomiting. 30 tablet 0   • pantoprazole (PROTONIX) 40 MG EC tablet Take 1 tablet by mouth 2 (Two) Times a Day Before Meals. 60 tablet 0   • metFORMIN ER (GLUCOPHAGE-XR) 500 MG 24 hr tablet Take 1 tablet by mouth Daily With Breakfast. 60 tablet 9     No current facility-administered medications for this visit.     Current Outpatient Medications on File Prior to Visit   Medication Sig   • ondansetron (Zofran) 4 MG tablet Take 1 tablet by mouth Every 6 (Six) Hours As Needed for Nausea or Vomiting.   • pantoprazole (PROTONIX) 40 MG EC tablet Take 1 tablet by mouth 2  (Two) Times a Day Before Meals.   • [DISCONTINUED] metFORMIN (Glucophage) 500 MG tablet Take 1 tablet by mouth 2 (Two) Times a Day With Meals.     No current facility-administered medications on file prior to visit.     He has No Known Allergies..    Review of Systems   Constitutional: Negative.    HENT: Negative.    Respiratory: Negative.    Cardiovascular: Negative.    Gastrointestinal: Negative.    Genitourinary: Negative.    Musculoskeletal: Negative.    Neurological: Negative.    Hematological: Negative.    Psychiatric/Behavioral: Negative.        Objective   Physical Exam  Vitals and nursing note reviewed.   Constitutional:       General: He is not in acute distress.     Appearance: He is well-developed. He is not diaphoretic.   HENT:      Head: Normocephalic and atraumatic.      Right Ear: External ear normal.      Left Ear: External ear normal.      Mouth/Throat:      Pharynx: No oropharyngeal exudate.   Eyes:      General: No scleral icterus.        Right eye: No discharge.      Conjunctiva/sclera: Conjunctivae normal.   Neck:      Thyroid: No thyromegaly.      Vascular: No JVD.      Trachea: No tracheal deviation.   Cardiovascular:      Rate and Rhythm: Normal rate and regular rhythm.      Heart sounds: Normal heart sounds.      Comments: PMI nondisplaced  Pulmonary:      Effort: Pulmonary effort is normal.      Breath sounds: Normal breath sounds. No wheezing or rales.   Abdominal:      General: Bowel sounds are normal.      Palpations: Abdomen is soft.      Tenderness: There is no abdominal tenderness. There is no guarding or rebound.   Musculoskeletal:      Cervical back: Normal range of motion and neck supple.      Comments: Normal gait   Lymphadenopathy:      Cervical: No cervical adenopathy.   Skin:     General: Skin is warm and dry.      Capillary Refill: Capillary refill takes less than 2 seconds.      Coloration: Skin is not pale.      Findings: No rash.   Neurological:      Mental Status: He is  alert and oriented to person, place, and time.      Motor: No abnormal muscle tone.      Coordination: Coordination normal.   Psychiatric:         Judgment: Judgment normal.         Assessment/Plan   Diagnoses and all orders for this visit:    1. Type 2 diabetes mellitus without complication, with long-term current use of insulin ([facility]) (Primary)  Assessment & Plan:  Diabetes is improving with treatment.   Reminded to bring in blood sugar diary at next visit.  Dietary recommendations for ADA diet.  Regular aerobic exercise.  Discussed ways to avoid symptomatic hypoglycemia.  Discussed foot care.  Reminded to get yearly retinal exam.  Medication changes per orders.  Diabetes will be reassessed in 3 months.    Orders:  -     MicroAlbumin, Urine, Random - Urine, Clean Catch; Future  -     MicroAlbumin, Urine, Random - Urine, Clean Catch    2. Hypertriglyceridemia    3. Alcohol-induced acute pancreatitis, unspecified complication status  Assessment & Plan:  Discussed compliance  With abstinenece of alcohlol      4. Alcohol abuse  Assessment & Plan:  Reviewed  12 step programs, he had 6 years sober rior to this recent relaps He has contacted his AA sponsor      Other orders  -     metFORMIN ER (GLUCOPHAGE-XR) 500 MG 24 hr tablet; Take 1 tablet by mouth Daily With Breakfast.  Dispense: 60 tablet; Refill: 9

## 2022-03-28 NOTE — ASSESSMENT & PLAN NOTE
Reviewed  12 step programs, he had 6 years sober rior to this recent relaps He has contacted his AA sponsor

## 2022-03-29 ENCOUNTER — TELEPHONE (OUTPATIENT)
Dept: FAMILY MEDICINE CLINIC | Facility: CLINIC | Age: 42
End: 2022-03-29

## 2022-03-29 DIAGNOSIS — K85.20 ALCOHOL-INDUCED ACUTE PANCREATITIS, UNSPECIFIED COMPLICATION STATUS: Primary | ICD-10-CM

## 2022-03-29 LAB — ALBUMIN UR-MCNC: <1.2 MG/DL

## 2022-03-29 RX ORDER — TRAMADOL HYDROCHLORIDE 50 MG/1
50 TABLET ORAL EVERY 6 HOURS PRN
Qty: 20 TABLET | Refills: 0 | Status: SHIPPED | OUTPATIENT
Start: 2022-03-29

## 2022-03-29 NOTE — TELEPHONE ENCOUNTER
Caller: Yg Araujo    Relationship: Self    Best call back number:    [phone]    Requested Prescriptions:     PERCOCET - (5) MG    PATIENT STATED HE IS COMPLETELY OUT OF THE PAIN MEDICATION AND IS IN PAIN CURRENTLY     Pharmacy where request should be sent: [facility]     Additional details provided by patient:     N/A    Does the patient have less than a 3 day supply:  [x] Yes  [] No    Joann Ho Rep   03/29/22 08:04 EDT     DR BHATIA

## 2022-03-30 ENCOUNTER — HOSPITAL ENCOUNTER (INPATIENT)
Facility: HOSPITAL | Age: 42
LOS: 3 days | Discharge: HOME | End: 2022-04-02
Attending: STUDENT IN AN ORGANIZED HEALTH CARE EDUCATION/TRAINING PROGRAM | Admitting: INTERNAL MEDICINE

## 2022-03-30 ENCOUNTER — APPOINTMENT (OUTPATIENT)
Dept: ULTRASOUND IMAGING | Facility: HOSPITAL | Age: 42
End: 2022-03-30

## 2022-03-30 ENCOUNTER — APPOINTMENT (OUTPATIENT)
Dept: CT IMAGING | Facility: HOSPITAL | Age: 42
End: 2022-03-30

## 2022-03-30 DIAGNOSIS — Z86.79 HISTORY OF HYPERTENSION: ICD-10-CM

## 2022-03-30 DIAGNOSIS — E78.1 HYPERTRIGLYCERIDEMIA: ICD-10-CM

## 2022-03-30 DIAGNOSIS — Z87.19 HISTORY OF GASTROESOPHAGEAL REFLUX (GERD): ICD-10-CM

## 2022-03-30 DIAGNOSIS — Z72.0 TOBACCO ABUSE: ICD-10-CM

## 2022-03-30 DIAGNOSIS — Z87.898 HISTORY OF ALCOHOL USE: ICD-10-CM

## 2022-03-30 DIAGNOSIS — K29.80 DUODENITIS: ICD-10-CM

## 2022-03-30 DIAGNOSIS — K85.90 ACUTE RECURRENT PANCREATITIS: Primary | ICD-10-CM

## 2022-03-30 DIAGNOSIS — Z86.39 HISTORY OF DIABETES MELLITUS: ICD-10-CM

## 2022-03-30 DIAGNOSIS — R10.11 RUQ ABDOMINAL PAIN: ICD-10-CM

## 2022-03-30 PROBLEM — K21.9 GERD WITHOUT ESOPHAGITIS: Status: ACTIVE | Noted: 2022-03-30

## 2022-03-30 PROBLEM — E87.1 HYPONATREMIA: Status: ACTIVE | Noted: 2022-03-30

## 2022-03-30 LAB
ALBUMIN SERPL-MCNC: 3.8 G/DL (ref 3.5–5.2)
ALBUMIN/GLOB SERPL: 1.3 G/DL
ALP SERPL-CCNC: 59 U/L (ref 39–117)
ALT SERPL W P-5'-P-CCNC: 24 U/L (ref 1–41)
AMYLASE SERPL-CCNC: 162 U/L (ref 28–100)
ANION GAP SERPL CALCULATED.3IONS-SCNC: 10 MMOL/L (ref 5–15)
ANION GAP SERPL CALCULATED.3IONS-SCNC: 11 MMOL/L (ref 5–15)
ANION GAP SERPL CALCULATED.3IONS-SCNC: 8 MMOL/L (ref 5–15)
AST SERPL-CCNC: 20 U/L (ref 1–40)
BASOPHILS # BLD MANUAL: 0 10*3/MM3 (ref 0–0.2)
BASOPHILS NFR BLD MANUAL: 0 % (ref 0–1.5)
BILIRUB SERPL-MCNC: 0.3 MG/DL (ref 0–1.2)
BUN SERPL-MCNC: 11 MG/DL (ref 6–20)
BUN SERPL-MCNC: 14 MG/DL (ref 6–20)
BUN SERPL-MCNC: 14 MG/DL (ref 6–20)
BUN/CREAT SERPL: 13.1 (ref 7–25)
BUN/CREAT SERPL: 16.3 (ref 7–25)
BUN/CREAT SERPL: 17.1 (ref 7–25)
CALCIUM SPEC-SCNC: 8.5 MG/DL (ref 8.6–10.5)
CALCIUM SPEC-SCNC: 8.6 MG/DL (ref 8.6–10.5)
CALCIUM SPEC-SCNC: 8.9 MG/DL (ref 8.6–10.5)
CHLORIDE SERPL-SCNC: 101 MMOL/L (ref 98–107)
CHLORIDE SERPL-SCNC: 102 MMOL/L (ref 98–107)
CHLORIDE SERPL-SCNC: 98 MMOL/L (ref 98–107)
CHOLEST SERPL-MCNC: 231 MG/DL (ref 0–200)
CO2 SERPL-SCNC: 23 MMOL/L (ref 22–29)
CO2 SERPL-SCNC: 25 MMOL/L (ref 22–29)
CO2 SERPL-SCNC: 25 MMOL/L (ref 22–29)
CREAT SERPL-MCNC: 0.82 MG/DL (ref 0.76–1.27)
CREAT SERPL-MCNC: 0.84 MG/DL (ref 0.76–1.27)
CREAT SERPL-MCNC: 0.86 MG/DL (ref 0.76–1.27)
D-LACTATE SERPL-SCNC: 1.6 MMOL/L (ref 0.5–2)
D-LACTATE SERPL-SCNC: 1.7 MMOL/L (ref 0.5–2)
DEPRECATED RDW RBC AUTO: 38.8 FL (ref 37–54)
EGFRCR SERPLBLD CKD-EPI 2021: 111.6 ML/MIN/1.73
EGFRCR SERPLBLD CKD-EPI 2021: 112.4 ML/MIN/1.73
EGFRCR SERPLBLD CKD-EPI 2021: 113.2 ML/MIN/1.73
EOSINOPHIL # BLD MANUAL: 0.56 10*3/MM3 (ref 0–0.4)
EOSINOPHIL NFR BLD MANUAL: 6 % (ref 0.3–6.2)
ERYTHROCYTE [DISTWIDTH] IN BLOOD BY AUTOMATED COUNT: 12.2 % (ref 12.3–15.4)
ETHANOL BLD-MCNC: <10 MG/DL (ref 0–10)
FLUAV RNA RESP QL NAA+PROBE: NOT DETECTED
FLUBV RNA RESP QL NAA+PROBE: NOT DETECTED
GLOBULIN UR ELPH-MCNC: 3 GM/DL
GLUCOSE BLDC GLUCOMTR-MCNC: 105 MG/DL (ref 70–130)
GLUCOSE BLDC GLUCOMTR-MCNC: 110 MG/DL (ref 70–130)
GLUCOSE BLDC GLUCOMTR-MCNC: 132 MG/DL (ref 70–130)
GLUCOSE BLDC GLUCOMTR-MCNC: 148 MG/DL (ref 70–130)
GLUCOSE BLDC GLUCOMTR-MCNC: 63 MG/DL (ref 70–130)
GLUCOSE BLDC GLUCOMTR-MCNC: 73 MG/DL (ref 70–130)
GLUCOSE BLDC GLUCOMTR-MCNC: 73 MG/DL (ref 70–130)
GLUCOSE BLDC GLUCOMTR-MCNC: 77 MG/DL (ref 70–130)
GLUCOSE BLDC GLUCOMTR-MCNC: 92 MG/DL (ref 70–130)
GLUCOSE BLDC GLUCOMTR-MCNC: 99 MG/DL (ref 70–130)
GLUCOSE SERPL-MCNC: 158 MG/DL (ref 65–99)
GLUCOSE SERPL-MCNC: 196 MG/DL (ref 65–99)
GLUCOSE SERPL-MCNC: 231 MG/DL (ref 65–99)
HBA1C MFR BLD: 7.7 % (ref 4.8–5.6)
HCT VFR BLD AUTO: 42.3 % (ref 37.5–51)
HDLC SERPL-MCNC: 30 MG/DL (ref 40–60)
HGB BLD-MCNC: 14.4 G/DL (ref 13–17.7)
HOLD SPECIMEN: NORMAL
LDLC SERPL CALC-MCNC: 133 MG/DL (ref 0–100)
LDLC/HDLC SERPL: 4.21 {RATIO}
LIPASE SERPL-CCNC: 565 U/L (ref 13–60)
LYMPHOCYTES # BLD MANUAL: 3.99 10*3/MM3 (ref 0.7–3.1)
LYMPHOCYTES NFR BLD MANUAL: 3 % (ref 5–12)
MAGNESIUM SERPL-MCNC: 1.6 MG/DL (ref 1.6–2.6)
MCH RBC QN AUTO: 29.9 PG (ref 26.6–33)
MCHC RBC AUTO-ENTMCNC: 34 G/DL (ref 31.5–35.7)
MCV RBC AUTO: 87.8 FL (ref 79–97)
MONOCYTES # BLD: 0.28 10*3/MM3 (ref 0.1–0.9)
MYELOCYTES NFR BLD MANUAL: 1 % (ref 0–0)
NEUTROPHILS # BLD AUTO: 4.36 10*3/MM3 (ref 1.7–7)
NEUTROPHILS NFR BLD MANUAL: 47 % (ref 42.7–76)
PHOSPHATE SERPL-MCNC: 3.3 MG/DL (ref 2.5–4.5)
PLAT MORPH BLD: NORMAL
PLATELET # BLD AUTO: 364 10*3/MM3 (ref 140–450)
PMV BLD AUTO: 10.6 FL (ref 6–12)
POTASSIUM SERPL-SCNC: 3.8 MMOL/L (ref 3.5–5.2)
POTASSIUM SERPL-SCNC: 4.1 MMOL/L (ref 3.5–5.2)
POTASSIUM SERPL-SCNC: 4.2 MMOL/L (ref 3.5–5.2)
PROT SERPL-MCNC: 6.8 G/DL (ref 6–8.5)
RBC # BLD AUTO: 4.82 10*6/MM3 (ref 4.14–5.8)
RBC MORPH BLD: NORMAL
SARS-COV-2 RNA RESP QL NAA+PROBE: NOT DETECTED
SMUDGE CELLS BLD QL SMEAR: ABNORMAL
SODIUM SERPL-SCNC: 132 MMOL/L (ref 136–145)
SODIUM SERPL-SCNC: 135 MMOL/L (ref 136–145)
SODIUM SERPL-SCNC: 136 MMOL/L (ref 136–145)
TRIGL SERPL-MCNC: 1075 MG/DL (ref 0–150)
TRIGL SERPL-MCNC: 1120 MG/DL (ref 0–150)
TRIGL SERPL-MCNC: 374 MG/DL (ref 0–150)
TRIGL SERPL-MCNC: 374 MG/DL (ref 0–150)
VARIANT LYMPHS NFR BLD MANUAL: 2 % (ref 0–5)
VARIANT LYMPHS NFR BLD MANUAL: 41 % (ref 19.6–45.3)
VLDLC SERPL-MCNC: 68 MG/DL (ref 5–40)
WBC NRBC COR # BLD: 9.27 10*3/MM3 (ref 3.4–10.8)
WHOLE BLOOD HOLD SPECIMEN: NORMAL
WHOLE BLOOD HOLD SPECIMEN: NORMAL

## 2022-03-30 PROCEDURE — 82077 ASSAY SPEC XCP UR&BREATH IA: CPT | Performed by: PHYSICIAN ASSISTANT

## 2022-03-30 PROCEDURE — 84478 ASSAY OF TRIGLYCERIDES: CPT | Performed by: STUDENT IN AN ORGANIZED HEALTH CARE EDUCATION/TRAINING PROGRAM

## 2022-03-30 PROCEDURE — 82962 GLUCOSE BLOOD TEST: CPT

## 2022-03-30 PROCEDURE — 83605 ASSAY OF LACTIC ACID: CPT | Performed by: PHYSICIAN ASSISTANT

## 2022-03-30 PROCEDURE — 85025 COMPLETE CBC W/AUTO DIFF WBC: CPT | Performed by: STUDENT IN AN ORGANIZED HEALTH CARE EDUCATION/TRAINING PROGRAM

## 2022-03-30 PROCEDURE — 25010000002 ONDANSETRON PER 1 MG: Performed by: STUDENT IN AN ORGANIZED HEALTH CARE EDUCATION/TRAINING PROGRAM

## 2022-03-30 PROCEDURE — 99284 EMERGENCY DEPT VISIT MOD MDM: CPT

## 2022-03-30 PROCEDURE — 99223 1ST HOSP IP/OBS HIGH 75: CPT | Performed by: NURSE PRACTITIONER

## 2022-03-30 PROCEDURE — 25010000002 METOCLOPRAMIDE PER 10 MG: Performed by: PHYSICIAN ASSISTANT

## 2022-03-30 PROCEDURE — 83036 HEMOGLOBIN GLYCOSYLATED A1C: CPT | Performed by: INTERNAL MEDICINE

## 2022-03-30 PROCEDURE — 99223 1ST HOSP IP/OBS HIGH 75: CPT | Performed by: INTERNAL MEDICINE

## 2022-03-30 PROCEDURE — 82150 ASSAY OF AMYLASE: CPT | Performed by: PHYSICIAN ASSISTANT

## 2022-03-30 PROCEDURE — 80053 COMPREHEN METABOLIC PANEL: CPT | Performed by: STUDENT IN AN ORGANIZED HEALTH CARE EDUCATION/TRAINING PROGRAM

## 2022-03-30 PROCEDURE — 80061 LIPID PANEL: CPT | Performed by: PHYSICIAN ASSISTANT

## 2022-03-30 PROCEDURE — 84100 ASSAY OF PHOSPHORUS: CPT | Performed by: INTERNAL MEDICINE

## 2022-03-30 PROCEDURE — 84478 ASSAY OF TRIGLYCERIDES: CPT | Performed by: INTERNAL MEDICINE

## 2022-03-30 PROCEDURE — 25010000002 IOPAMIDOL 61 % SOLUTION: Performed by: STUDENT IN AN ORGANIZED HEALTH CARE EDUCATION/TRAINING PROGRAM

## 2022-03-30 PROCEDURE — 25010000002 HYDROMORPHONE PER 4 MG: Performed by: STUDENT IN AN ORGANIZED HEALTH CARE EDUCATION/TRAINING PROGRAM

## 2022-03-30 PROCEDURE — 74177 CT ABD & PELVIS W/CONTRAST: CPT

## 2022-03-30 PROCEDURE — 25010000002 KETOROLAC TROMETHAMINE PER 15 MG: Performed by: INTERNAL MEDICINE

## 2022-03-30 PROCEDURE — 83735 ASSAY OF MAGNESIUM: CPT | Performed by: INTERNAL MEDICINE

## 2022-03-30 PROCEDURE — 25010000002 HYDROMORPHONE PER 4 MG: Performed by: INTERNAL MEDICINE

## 2022-03-30 PROCEDURE — 83690 ASSAY OF LIPASE: CPT | Performed by: STUDENT IN AN ORGANIZED HEALTH CARE EDUCATION/TRAINING PROGRAM

## 2022-03-30 PROCEDURE — 87636 SARSCOV2 & INF A&B AMP PRB: CPT | Performed by: PHYSICIAN ASSISTANT

## 2022-03-30 PROCEDURE — 76705 ECHO EXAM OF ABDOMEN: CPT

## 2022-03-30 PROCEDURE — 85007 BL SMEAR W/DIFF WBC COUNT: CPT | Performed by: STUDENT IN AN ORGANIZED HEALTH CARE EDUCATION/TRAINING PROGRAM

## 2022-03-30 RX ORDER — SODIUM CHLORIDE 0.9 % (FLUSH) 0.9 %
10 SYRINGE (ML) INJECTION EVERY 12 HOURS SCHEDULED
Status: DISCONTINUED | OUTPATIENT
Start: 2022-03-30 | End: 2022-04-02 | Stop reason: HOSPADM

## 2022-03-30 RX ORDER — DEXTROSE MONOHYDRATE 25 G/50ML
25 INJECTION, SOLUTION INTRAVENOUS
Status: DISCONTINUED | OUTPATIENT
Start: 2022-03-30 | End: 2022-03-30 | Stop reason: SDUPTHER

## 2022-03-30 RX ORDER — CHOLECALCIFEROL (VITAMIN D3) 125 MCG
5 CAPSULE ORAL NIGHTLY PRN
Status: DISCONTINUED | OUTPATIENT
Start: 2022-03-30 | End: 2022-04-02 | Stop reason: HOSPADM

## 2022-03-30 RX ORDER — PANTOPRAZOLE SODIUM 40 MG/1
40 TABLET, DELAYED RELEASE ORAL
Status: DISCONTINUED | OUTPATIENT
Start: 2022-03-30 | End: 2022-04-02 | Stop reason: HOSPADM

## 2022-03-30 RX ORDER — HYDROMORPHONE HYDROCHLORIDE 1 MG/ML
0.5 INJECTION, SOLUTION INTRAMUSCULAR; INTRAVENOUS; SUBCUTANEOUS ONCE
Status: COMPLETED | OUTPATIENT
Start: 2022-03-30 | End: 2022-03-30

## 2022-03-30 RX ORDER — SODIUM CHLORIDE 0.9 % (FLUSH) 0.9 %
10 SYRINGE (ML) INJECTION AS NEEDED
Status: DISCONTINUED | OUTPATIENT
Start: 2022-03-30 | End: 2022-04-02 | Stop reason: HOSPADM

## 2022-03-30 RX ORDER — DEXTROSE MONOHYDRATE 100 MG/ML
1-2 INJECTION, SOLUTION INTRAVENOUS
Status: DISCONTINUED | OUTPATIENT
Start: 2022-03-30 | End: 2022-03-31

## 2022-03-30 RX ORDER — DEXTROSE MONOHYDRATE 25 G/50ML
25 INJECTION, SOLUTION INTRAVENOUS
Status: DISCONTINUED | OUTPATIENT
Start: 2022-03-30 | End: 2022-03-31

## 2022-03-30 RX ORDER — ONDANSETRON 2 MG/ML
4 INJECTION INTRAMUSCULAR; INTRAVENOUS EVERY 6 HOURS PRN
Status: DISCONTINUED | OUTPATIENT
Start: 2022-03-30 | End: 2022-04-02 | Stop reason: HOSPADM

## 2022-03-30 RX ORDER — NICOTINE POLACRILEX 4 MG
15 LOZENGE BUCCAL
Status: DISCONTINUED | OUTPATIENT
Start: 2022-03-30 | End: 2022-03-31

## 2022-03-30 RX ORDER — ACETAMINOPHEN 325 MG/1
650 TABLET ORAL EVERY 4 HOURS PRN
Status: DISCONTINUED | OUTPATIENT
Start: 2022-03-30 | End: 2022-04-02 | Stop reason: HOSPADM

## 2022-03-30 RX ORDER — ONDANSETRON 4 MG/1
4 TABLET, FILM COATED ORAL EVERY 6 HOURS PRN
Status: DISCONTINUED | OUTPATIENT
Start: 2022-03-30 | End: 2022-04-02 | Stop reason: HOSPADM

## 2022-03-30 RX ORDER — HYDROMORPHONE HYDROCHLORIDE 1 MG/ML
0.5 INJECTION, SOLUTION INTRAMUSCULAR; INTRAVENOUS; SUBCUTANEOUS
Status: DISCONTINUED | OUTPATIENT
Start: 2022-03-30 | End: 2022-04-02 | Stop reason: HOSPADM

## 2022-03-30 RX ORDER — SODIUM CHLORIDE 9 MG/ML
100 INJECTION, SOLUTION INTRAVENOUS CONTINUOUS
Status: DISCONTINUED | OUTPATIENT
Start: 2022-03-30 | End: 2022-03-30

## 2022-03-30 RX ORDER — METOCLOPRAMIDE HYDROCHLORIDE 5 MG/ML
10 INJECTION INTRAMUSCULAR; INTRAVENOUS ONCE
Status: COMPLETED | OUTPATIENT
Start: 2022-03-30 | End: 2022-03-30

## 2022-03-30 RX ORDER — ONDANSETRON 2 MG/ML
4 INJECTION INTRAMUSCULAR; INTRAVENOUS ONCE
Status: COMPLETED | OUTPATIENT
Start: 2022-03-30 | End: 2022-03-30

## 2022-03-30 RX ORDER — SODIUM CHLORIDE 9 MG/ML
10 INJECTION INTRAVENOUS AS NEEDED
Status: DISCONTINUED | OUTPATIENT
Start: 2022-03-30 | End: 2022-04-02 | Stop reason: HOSPADM

## 2022-03-30 RX ORDER — NICOTINE POLACRILEX 4 MG
15 LOZENGE BUCCAL
Status: DISCONTINUED | OUTPATIENT
Start: 2022-03-30 | End: 2022-03-30 | Stop reason: SDUPTHER

## 2022-03-30 RX ORDER — KETOROLAC TROMETHAMINE 15 MG/ML
15 INJECTION, SOLUTION INTRAMUSCULAR; INTRAVENOUS EVERY 6 HOURS PRN
Status: DISCONTINUED | OUTPATIENT
Start: 2022-03-30 | End: 2022-04-02 | Stop reason: HOSPADM

## 2022-03-30 RX ORDER — OXYCODONE HYDROCHLORIDE AND ACETAMINOPHEN 5; 325 MG/1; MG/1
1 TABLET ORAL EVERY 6 HOURS PRN
COMMUNITY

## 2022-03-30 RX ADMIN — HYDROMORPHONE HYDROCHLORIDE 0.5 MG: 1 INJECTION, SOLUTION INTRAMUSCULAR; INTRAVENOUS; SUBCUTANEOUS at 12:03

## 2022-03-30 RX ADMIN — INSULIN HUMAN 0.05 UNITS/KG/HR: 1 INJECTION, SOLUTION INTRAVENOUS at 09:10

## 2022-03-30 RX ADMIN — DEXTROSE MONOHYDRATE 1 ML/KG/HR: 100 INJECTION, SOLUTION INTRAVENOUS at 09:11

## 2022-03-30 RX ADMIN — Medication 10 ML: at 21:48

## 2022-03-30 RX ADMIN — DEXTROSE MONOHYDRATE 1.99 ML/KG/HR: 100 INJECTION, SOLUTION INTRAVENOUS at 20:04

## 2022-03-30 RX ADMIN — ONDANSETRON 4 MG: 2 INJECTION INTRAMUSCULAR; INTRAVENOUS at 03:23

## 2022-03-30 RX ADMIN — SODIUM CHLORIDE 1000 ML: 9 INJECTION, SOLUTION INTRAVENOUS at 01:49

## 2022-03-30 RX ADMIN — HYDROMORPHONE HYDROCHLORIDE 0.5 MG: 1 INJECTION, SOLUTION INTRAMUSCULAR; INTRAVENOUS; SUBCUTANEOUS at 20:01

## 2022-03-30 RX ADMIN — HYDROMORPHONE HYDROCHLORIDE 0.5 MG: 1 INJECTION, SOLUTION INTRAMUSCULAR; INTRAVENOUS; SUBCUTANEOUS at 13:56

## 2022-03-30 RX ADMIN — DEXTROSE MONOHYDRATE 2 ML/KG/HR: 100 INJECTION, SOLUTION INTRAVENOUS at 15:01

## 2022-03-30 RX ADMIN — HYDROMORPHONE HYDROCHLORIDE 0.5 MG: 1 INJECTION, SOLUTION INTRAMUSCULAR; INTRAVENOUS; SUBCUTANEOUS at 09:55

## 2022-03-30 RX ADMIN — KETOROLAC TROMETHAMINE 15 MG: 15 INJECTION, SOLUTION INTRAMUSCULAR; INTRAVENOUS at 13:08

## 2022-03-30 RX ADMIN — INSULIN HUMAN 0.05 UNITS/KG/HR: 1 INJECTION, SOLUTION INTRAVENOUS at 22:05

## 2022-03-30 RX ADMIN — METOCLOPRAMIDE 10 MG: 5 INJECTION, SOLUTION INTRAMUSCULAR; INTRAVENOUS at 01:50

## 2022-03-30 RX ADMIN — PANTOPRAZOLE SODIUM 40 MG: 40 TABLET, DELAYED RELEASE ORAL at 16:59

## 2022-03-30 RX ADMIN — IOPAMIDOL 90 ML: 612 INJECTION, SOLUTION INTRAVENOUS at 03:06

## 2022-03-30 RX ADMIN — DEXTROSE MONOHYDRATE 25 G: 25 INJECTION, SOLUTION INTRAVENOUS at 13:03

## 2022-03-30 RX ADMIN — HYDROMORPHONE HYDROCHLORIDE 0.5 MG: 1 INJECTION, SOLUTION INTRAMUSCULAR; INTRAVENOUS; SUBCUTANEOUS at 03:23

## 2022-03-30 RX ADMIN — HYDROMORPHONE HYDROCHLORIDE 0.5 MG: 1 INJECTION, SOLUTION INTRAMUSCULAR; INTRAVENOUS; SUBCUTANEOUS at 16:59

## 2022-03-31 LAB
BASOPHILS # BLD AUTO: 0.04 10*3/MM3 (ref 0–0.2)
BASOPHILS NFR BLD AUTO: 0.6 % (ref 0–1.5)
DEPRECATED RDW RBC AUTO: 40.2 FL (ref 37–54)
EOSINOPHIL # BLD AUTO: 0.2 10*3/MM3 (ref 0–0.4)
EOSINOPHIL NFR BLD AUTO: 3 % (ref 0.3–6.2)
ERYTHROCYTE [DISTWIDTH] IN BLOOD BY AUTOMATED COUNT: 12.3 % (ref 12.3–15.4)
GLUCOSE BLDC GLUCOMTR-MCNC: 119 MG/DL (ref 70–130)
GLUCOSE BLDC GLUCOMTR-MCNC: 141 MG/DL (ref 70–130)
GLUCOSE BLDC GLUCOMTR-MCNC: 168 MG/DL (ref 70–130)
GLUCOSE BLDC GLUCOMTR-MCNC: 182 MG/DL (ref 70–130)
HCT VFR BLD AUTO: 43 % (ref 37.5–51)
HGB BLD-MCNC: 14.1 G/DL (ref 13–17.7)
IMM GRANULOCYTES # BLD AUTO: 0.04 10*3/MM3 (ref 0–0.05)
IMM GRANULOCYTES NFR BLD AUTO: 0.6 % (ref 0–0.5)
LYMPHOCYTES # BLD AUTO: 2.67 10*3/MM3 (ref 0.7–3.1)
LYMPHOCYTES NFR BLD AUTO: 40.5 % (ref 19.6–45.3)
MCH RBC QN AUTO: 29.3 PG (ref 26.6–33)
MCHC RBC AUTO-ENTMCNC: 32.8 G/DL (ref 31.5–35.7)
MCV RBC AUTO: 89.2 FL (ref 79–97)
MONOCYTES # BLD AUTO: 0.46 10*3/MM3 (ref 0.1–0.9)
MONOCYTES NFR BLD AUTO: 7 % (ref 5–12)
NEUTROPHILS NFR BLD AUTO: 3.19 10*3/MM3 (ref 1.7–7)
NEUTROPHILS NFR BLD AUTO: 48.3 % (ref 42.7–76)
NRBC BLD AUTO-RTO: 0 /100 WBC (ref 0–0.2)
PLATELET # BLD AUTO: 335 10*3/MM3 (ref 140–450)
PMV BLD AUTO: 10.8 FL (ref 6–12)
RBC # BLD AUTO: 4.82 10*6/MM3 (ref 4.14–5.8)
WBC NRBC COR # BLD: 6.6 10*3/MM3 (ref 3.4–10.8)

## 2022-03-31 PROCEDURE — 99232 SBSQ HOSP IP/OBS MODERATE 35: CPT | Performed by: INTERNAL MEDICINE

## 2022-03-31 PROCEDURE — 82962 GLUCOSE BLOOD TEST: CPT

## 2022-03-31 PROCEDURE — 25010000002 HYDROMORPHONE PER 4 MG: Performed by: INTERNAL MEDICINE

## 2022-03-31 PROCEDURE — 99233 SBSQ HOSP IP/OBS HIGH 50: CPT | Performed by: PHYSICIAN ASSISTANT

## 2022-03-31 PROCEDURE — 85025 COMPLETE CBC W/AUTO DIFF WBC: CPT | Performed by: PHYSICIAN ASSISTANT

## 2022-03-31 RX ORDER — FENOFIBRATE 48 MG/1
48 TABLET, COATED ORAL DAILY
Status: DISCONTINUED | OUTPATIENT
Start: 2022-03-31 | End: 2022-04-02 | Stop reason: HOSPADM

## 2022-03-31 RX ORDER — DEXTROSE MONOHYDRATE 25 G/50ML
25 INJECTION, SOLUTION INTRAVENOUS
Status: DISCONTINUED | OUTPATIENT
Start: 2022-03-31 | End: 2022-04-02 | Stop reason: HOSPADM

## 2022-03-31 RX ORDER — NICOTINE POLACRILEX 4 MG
15 LOZENGE BUCCAL
Status: DISCONTINUED | OUTPATIENT
Start: 2022-03-31 | End: 2022-04-02 | Stop reason: HOSPADM

## 2022-03-31 RX ORDER — ATORVASTATIN CALCIUM 10 MG/1
10 TABLET, FILM COATED ORAL NIGHTLY
Status: DISCONTINUED | OUTPATIENT
Start: 2022-03-31 | End: 2022-04-02 | Stop reason: HOSPADM

## 2022-03-31 RX ORDER — HYDROCODONE BITARTRATE AND ACETAMINOPHEN 5; 325 MG/1; MG/1
1 TABLET ORAL EVERY 6 HOURS PRN
Status: DISCONTINUED | OUTPATIENT
Start: 2022-03-31 | End: 2022-04-02 | Stop reason: HOSPADM

## 2022-03-31 RX ADMIN — PANTOPRAZOLE SODIUM 40 MG: 40 TABLET, DELAYED RELEASE ORAL at 08:06

## 2022-03-31 RX ADMIN — Medication 10 ML: at 09:00

## 2022-03-31 RX ADMIN — HYDROMORPHONE HYDROCHLORIDE 0.5 MG: 1 INJECTION, SOLUTION INTRAMUSCULAR; INTRAVENOUS; SUBCUTANEOUS at 09:30

## 2022-03-31 RX ADMIN — HYDROMORPHONE HYDROCHLORIDE 0.5 MG: 1 INJECTION, SOLUTION INTRAMUSCULAR; INTRAVENOUS; SUBCUTANEOUS at 07:35

## 2022-03-31 RX ADMIN — PANTOPRAZOLE SODIUM 40 MG: 40 TABLET, DELAYED RELEASE ORAL at 17:43

## 2022-03-31 RX ADMIN — HYDROMORPHONE HYDROCHLORIDE 0.5 MG: 1 INJECTION, SOLUTION INTRAMUSCULAR; INTRAVENOUS; SUBCUTANEOUS at 09:20

## 2022-03-31 RX ADMIN — FENOFIBRATE 48 MG: 48 TABLET ORAL at 17:43

## 2022-03-31 RX ADMIN — HYDROMORPHONE HYDROCHLORIDE 0.5 MG: 1 INJECTION, SOLUTION INTRAMUSCULAR; INTRAVENOUS; SUBCUTANEOUS at 21:04

## 2022-03-31 RX ADMIN — HYDROCODONE BITARTRATE AND ACETAMINOPHEN 1 TABLET: 5; 325 TABLET ORAL at 10:48

## 2022-03-31 RX ADMIN — ATORVASTATIN CALCIUM 10 MG: 10 TABLET, FILM COATED ORAL at 20:58

## 2022-03-31 RX ADMIN — HYDROMORPHONE HYDROCHLORIDE 0.5 MG: 1 INJECTION, SOLUTION INTRAMUSCULAR; INTRAVENOUS; SUBCUTANEOUS at 11:50

## 2022-03-31 RX ADMIN — Medication 10 ML: at 21:00

## 2022-04-01 LAB
ANION GAP SERPL CALCULATED.3IONS-SCNC: 7 MMOL/L (ref 5–15)
BASOPHILS # BLD AUTO: 0.03 10*3/MM3 (ref 0–0.2)
BASOPHILS NFR BLD AUTO: 0.5 % (ref 0–1.5)
BUN SERPL-MCNC: 13 MG/DL (ref 6–20)
BUN/CREAT SERPL: 14.1 (ref 7–25)
CALCIUM SPEC-SCNC: 8.3 MG/DL (ref 8.6–10.5)
CHLORIDE SERPL-SCNC: 101 MMOL/L (ref 98–107)
CO2 SERPL-SCNC: 26 MMOL/L (ref 22–29)
CREAT SERPL-MCNC: 0.92 MG/DL (ref 0.76–1.27)
DEPRECATED RDW RBC AUTO: 40.5 FL (ref 37–54)
EGFRCR SERPLBLD CKD-EPI 2021: 107.2 ML/MIN/1.73
EOSINOPHIL # BLD AUTO: 0.18 10*3/MM3 (ref 0–0.4)
EOSINOPHIL NFR BLD AUTO: 2.8 % (ref 0.3–6.2)
ERYTHROCYTE [DISTWIDTH] IN BLOOD BY AUTOMATED COUNT: 12.5 % (ref 12.3–15.4)
GLUCOSE BLDC GLUCOMTR-MCNC: 106 MG/DL (ref 70–130)
GLUCOSE BLDC GLUCOMTR-MCNC: 112 MG/DL (ref 70–130)
GLUCOSE BLDC GLUCOMTR-MCNC: 150 MG/DL (ref 70–130)
GLUCOSE BLDC GLUCOMTR-MCNC: 154 MG/DL (ref 70–130)
GLUCOSE SERPL-MCNC: 144 MG/DL (ref 65–99)
HCT VFR BLD AUTO: 41.4 % (ref 37.5–51)
HGB BLD-MCNC: 13.6 G/DL (ref 13–17.7)
IMM GRANULOCYTES # BLD AUTO: 0.02 10*3/MM3 (ref 0–0.05)
IMM GRANULOCYTES NFR BLD AUTO: 0.3 % (ref 0–0.5)
LYMPHOCYTES # BLD AUTO: 3.65 10*3/MM3 (ref 0.7–3.1)
LYMPHOCYTES NFR BLD AUTO: 57.1 % (ref 19.6–45.3)
MCH RBC QN AUTO: 29.2 PG (ref 26.6–33)
MCHC RBC AUTO-ENTMCNC: 32.9 G/DL (ref 31.5–35.7)
MCV RBC AUTO: 88.8 FL (ref 79–97)
MONOCYTES # BLD AUTO: 0.42 10*3/MM3 (ref 0.1–0.9)
MONOCYTES NFR BLD AUTO: 6.6 % (ref 5–12)
NEUTROPHILS NFR BLD AUTO: 2.09 10*3/MM3 (ref 1.7–7)
NEUTROPHILS NFR BLD AUTO: 32.7 % (ref 42.7–76)
NRBC BLD AUTO-RTO: 0 /100 WBC (ref 0–0.2)
PLAT MORPH BLD: NORMAL
PLATELET # BLD AUTO: 321 10*3/MM3 (ref 140–450)
PMV BLD AUTO: 10.5 FL (ref 6–12)
POTASSIUM SERPL-SCNC: 4.2 MMOL/L (ref 3.5–5.2)
RBC # BLD AUTO: 4.66 10*6/MM3 (ref 4.14–5.8)
RBC MORPH BLD: NORMAL
SMUDGE CELLS BLD QL SMEAR: NORMAL
SODIUM SERPL-SCNC: 134 MMOL/L (ref 136–145)
WBC NRBC COR # BLD: 6.39 10*3/MM3 (ref 3.4–10.8)

## 2022-04-01 PROCEDURE — 25010000002 HYDROMORPHONE PER 4 MG: Performed by: INTERNAL MEDICINE

## 2022-04-01 PROCEDURE — 63710000001 INSULIN LISPRO (HUMAN) PER 5 UNITS: Performed by: INTERNAL MEDICINE

## 2022-04-01 PROCEDURE — 85025 COMPLETE CBC W/AUTO DIFF WBC: CPT | Performed by: PHYSICIAN ASSISTANT

## 2022-04-01 PROCEDURE — 80048 BASIC METABOLIC PNL TOTAL CA: CPT | Performed by: PHYSICIAN ASSISTANT

## 2022-04-01 PROCEDURE — 99232 SBSQ HOSP IP/OBS MODERATE 35: CPT | Performed by: INTERNAL MEDICINE

## 2022-04-01 PROCEDURE — 85007 BL SMEAR W/DIFF WBC COUNT: CPT | Performed by: PHYSICIAN ASSISTANT

## 2022-04-01 PROCEDURE — 82962 GLUCOSE BLOOD TEST: CPT

## 2022-04-01 RX ORDER — NICOTINE 21 MG/24HR
1 PATCH, TRANSDERMAL 24 HOURS TRANSDERMAL ONCE AS NEEDED
Status: DISCONTINUED | OUTPATIENT
Start: 2022-04-01 | End: 2022-04-02 | Stop reason: HOSPADM

## 2022-04-01 RX ORDER — NICOTINE 21 MG/24HR
1 PATCH, TRANSDERMAL 24 HOURS TRANSDERMAL
Status: DISCONTINUED | OUTPATIENT
Start: 2022-04-02 | End: 2022-04-02 | Stop reason: HOSPADM

## 2022-04-01 RX ADMIN — PANTOPRAZOLE SODIUM 40 MG: 40 TABLET, DELAYED RELEASE ORAL at 16:51

## 2022-04-01 RX ADMIN — PANTOPRAZOLE SODIUM 40 MG: 40 TABLET, DELAYED RELEASE ORAL at 09:00

## 2022-04-01 RX ADMIN — Medication 10 ML: at 13:16

## 2022-04-01 RX ADMIN — HYDROMORPHONE HYDROCHLORIDE 0.5 MG: 1 INJECTION, SOLUTION INTRAMUSCULAR; INTRAVENOUS; SUBCUTANEOUS at 09:01

## 2022-04-01 RX ADMIN — HYDROMORPHONE HYDROCHLORIDE 0.5 MG: 1 INJECTION, SOLUTION INTRAMUSCULAR; INTRAVENOUS; SUBCUTANEOUS at 13:15

## 2022-04-01 RX ADMIN — HYDROMORPHONE HYDROCHLORIDE 0.5 MG: 1 INJECTION, SOLUTION INTRAMUSCULAR; INTRAVENOUS; SUBCUTANEOUS at 20:21

## 2022-04-01 RX ADMIN — HYDROMORPHONE HYDROCHLORIDE 0.5 MG: 1 INJECTION, SOLUTION INTRAMUSCULAR; INTRAVENOUS; SUBCUTANEOUS at 02:24

## 2022-04-01 RX ADMIN — Medication 10 ML: at 20:21

## 2022-04-01 RX ADMIN — FENOFIBRATE 48 MG: 48 TABLET ORAL at 09:01

## 2022-04-01 RX ADMIN — NICOTINE 1 PATCH: 21 PATCH, EXTENDED RELEASE TRANSDERMAL at 21:27

## 2022-04-01 RX ADMIN — INSULIN LISPRO 2 UNITS: 100 INJECTION, SOLUTION INTRAVENOUS; SUBCUTANEOUS at 09:08

## 2022-04-01 RX ADMIN — ATORVASTATIN CALCIUM 10 MG: 10 TABLET, FILM COATED ORAL at 20:21

## 2022-04-01 RX ADMIN — INSULIN LISPRO 2 UNITS: 100 INJECTION, SOLUTION INTRAVENOUS; SUBCUTANEOUS at 13:15

## 2022-04-01 RX ADMIN — HYDROMORPHONE HYDROCHLORIDE 0.5 MG: 1 INJECTION, SOLUTION INTRAMUSCULAR; INTRAVENOUS; SUBCUTANEOUS at 06:07

## 2022-04-01 RX ADMIN — HYDROMORPHONE HYDROCHLORIDE 0.5 MG: 1 INJECTION, SOLUTION INTRAMUSCULAR; INTRAVENOUS; SUBCUTANEOUS at 16:51

## 2022-04-01 NOTE — PLAN OF CARE
Goal Outcome Evaluation:  Plan of Care Reviewed With: patient           Outcome Evaluation: Patient with complaints of right upper/medial abdominal pain requiring PRN pain meds that were effective. Diet was increased yesterday to regular no complaints of nausea.

## 2022-04-01 NOTE — CASE MANAGEMENT/SOCIAL WORK
Continued Stay Note  [facility]     Patient Name: Yg Araujo  MRN: [id number]  Today's Date: 4/1/2022    Admit Date: 3/30/2022     Discharge Plan     Row Name 04/01/22 1347       Plan    Plan update    Patient/Family in Agreement with Plan yes    Plan Comments Per MDR, patient c/o pain HS will likely not d/c today.  Spoke with patient at bedside regarding discharge plan who c/o pain at this time.  No discharge needs verbalzied.  CM following.  Patient plan is to discharge home via car with family to transport.    Final Discharge Disposition Code 01 - home or self-care               Discharge Codes    No documentation.               Expected Discharge Date and Time     Expected Discharge Date Expected Discharge Time    Apr 4, 2022             Azalia Pelayo, RN

## 2022-04-01 NOTE — PLAN OF CARE
Goal Outcome Evaluation:              Outcome Evaluation: A&Ox4, RA, NSR, pt c/o abd pain which increased when eating, pt started shift on regular diet low fat, GI soft diet, down graded to ful liquid and is now NPO d/t abd pain, utilized prn pain medication.

## 2022-04-01 NOTE — CONSULTS
Clinical Nutrition     Nutrition Assessment  Reason for Visit:   Physician consult, education      Patient Name: Yg Araujo  YOB: 1980  MRN: [id number]  Date of Encounter: 04/01/22 13:01 EDT  Admission date: 3/30/2022      Comments:  Pt provided education on Healthy Meal Planning for Diabetes, integrated low fat 50-55 gm restriction into this for better understanding. Pt also  given low fat diet education on 50-55 gm fat diet no greater than 25% of calories yesterday.       Assessment      Admission Diagnosis    Acute recurrent pancreatitis [K85.90]     Hospital Problem List    Alcohol-induced acute pancreatitis, unspecified complication status    Type 2 diabetes mellitus (HCC)    Hypertriglyceridemia    Alcohol abuse    GERD without esophagitis    Hyponatremia    Acute recurrent pancreatitis        Applicable Interval History:      Applicable PMH/PSxH:     PMH: He  has a past medical history of Alcohol abuse, Diverticulitis, GERD (gastroesophageal reflux disease), HLD (hyperlipidemia), HTN (hypertension), Pancreatitis, Pancreatitis, Polysubstance abuse (HCC), and Type 2 diabetes mellitus (HCC).   PSxH: He  has no past surgical history on file.         Diet/Nutrition Related History:   Pt reports he is not feeling  well this morning, abd pain after breakfast. He is wanting to go ahead and do diet education. Reviewed Meal Planning for Healthy eating w/ pt . Allowed ~ 2250 kcal/d , lowfat (50-55 gm), 5-6 carb servings per meals. Pt is also trying to do weight lifting and and weight loss. Discussed slow weight loss to prevent elevation of triglycerides importance of portion control and consistent carbohydrate distribution throughout the day.    Pt reports he is physically active at work and then lifts weights in the evening, He has reduced his weight to 198 lbs but found it to be back to 220 on admission.    Anthropometrics     Admission Height 172.7 cm  "(68\") Documented at 03/30/2022 0045   Admission Weight 99.8 kg (220 lb) Documented at 03/30/2022 0045     Last filed wt: Weight: 99.8 kg (220 lb) (03/30/22 0045)  Weight Method: Stated    BMI: BMI (Calculated): 33.5  Obese Class I: 30-34.9kg/m2    Retired Ideal Body Weight (IBW) (kg): 70.89  Adjustments:  Weight Weight (kg) Weight (lbs) Weight Method   3/30/2022 99.791 kg 220 lb Stated   3/28/2022 102.513 kg 226 lb -   3/18/2022 90.266 kg 199 lb Stated   3/18/2022 89.812 kg 198 lb Stated   3/27/2018 120.203 kg 265 lb Stated     Weight Change   UBW:  Weight change:   % wt change:   Time frame of weight loss:     Labs reviewed   Yes  Pertinent: triglycerides noted; glucoses also elevated on adm r/t pancreatits    Results from last 7 days   Lab Units 04/01/22  0600 03/30/22  1750 03/30/22  0536 03/30/22  0143   SODIUM mmol/L 134* 136 135* 132*   POTASSIUM mmol/L 4.2 3.8 4.2 4.1   CHLORIDE mmol/L 101 101 102 98   CO2 mmol/L 26.0 25.0 25.0 23.0   BUN mg/dL 13 11 14 14   CREATININE mg/dL 0.92 0.84 0.82 0.86   GLUCOSE mg/dL 144* 158* 196* 231*   CALCIUM mg/dL 8.3* 8.6 8.5* 8.9   PHOSPHORUS mg/dL  --   --  3.3  --    MAGNESIUM mg/dL  --   --  1.6  --    CHOLESTEROL mg/dL  --  231*  --   --    TRIGLYCERIDES mg/dL  --  374*  374* 1,120* 1,075*     Results from last 7 days   Lab Units 04/01/22  0600 03/31/22  0950 03/30/22  1750 03/30/22  0536 03/30/22  0143 03/30/22  0105   WBC 10*3/mm3 6.39 6.60  --   --   --  9.27   ALBUMIN g/dL  --   --   --   --  3.80  --    CHOLESTEROL mg/dL  --   --  231*  --   --   --    TRIGLYCERIDES mg/dL  --   --  374*  374* 1,120* 1,075*  --        Lab Results   Lab Value Date/Time    HGBA1C 7.70 (H) 03/30/2022 0536    HGBA1C 7.80 (H) 03/20/2022 0450       Medications reviewed   Yes  Pertinent:  Protonix, tricor,lipitor   f  Needs Assessment     Height used:   Weight used: 90 kg  IBW:     Estimated Energy needs:    25-30 kcal/kg =8055-6326 kcal/d    Estimated Protein needs:        Estimated Fluid " needs:           Current Nutrition Prescription     PO: NPO Diet  ONS:No active supplement orders    Evaluation of Received Nutrient/Fluid Intake:     Oral Intake:  Days evaluated:  Insufficient data  Meals reviewed :    Average Intake %:       Nutrition Diagnosis     3/31, 4/1  Problem Altered nutrition related laboratory values   Etiology Food and nutrition knowledge deficit   Signs/Symptoms elev Trig, Bgs, Hgb A1C   Status: somewhat improved, education provided    3/31, 4/1  Problem Altered GI function   Etiology Pancreatitis r/t elevated triglycerides hx of EtOH use   Signs/Symptoms abd pain , elev triglycerides   Status:  ongoing    Goal:   General: Nutrition to support treatment, Improve nutrition related labs, Provide information regarding MNT therapy     PO: Establish PO, Tolerate PO    Nutrition Intervention     Follow treatment progress, Care plan reviewed, Education provided   Discusses rational for low fat diet in mgt of pancreatitis, hand-out from the AND provided. Pt also provided Healthy Meal Planning hand-out  Pt able to provide teach back.       Monitoring/Evaluation:   Per protocol, PO intake, Pertinent labs, Weight, GI status, Symptoms, Provide additional education prior to dc    Marlena Quispe, MS,RD,LD,   Time Spent: 30 mins

## 2022-04-01 NOTE — PROGRESS NOTES
[facility] Medicine Services  PROGRESS NOTE    Patient Name: Yg Araujo  : 1980  MRN: [id number]    Date of Admission: 3/30/2022  Primary Care Physician: Tang Rios MD    Subjective   Subjective     CC:  F/u pancreatitis    HPI:  Patient resting in bed. Had increased pain this morning after breakfast.    ROS:  Gen- No fevers, chills  CV- No chest pain, palpitations  Resp- No cough, dyspnea  GI- No N/V/D, +abd pain       Objective   Objective     Vital Signs:   Temp:  [97.9 °F (36.6 °C)-98.5 °F (36.9 °C)] 97.9 °F (36.6 °C)  Heart Rate:  [58-73] 65  Resp:  [16-18] 17  BP: (108-130)/(75-99) 108/75     Physical Exam:  Constitutional: No acute distress, awake, alert  HENT: NCAT, mucous membranes moist  Respiratory: Clear to auscultation bilaterally, respiratory effort normal   Cardiovascular: RRR, no murmurs, rubs, or gallops  Gastrointestinal: Positive bowel sounds, soft, mildly tender  Musculoskeletal: No bilateral ankle edema  Psychiatric: Appropriate affect, cooperative  Neurologic: Oriented x 3, strength symmetric in all extremities, Cranial Nerves grossly intact to confrontation, speech clear  Skin: No rashes      Results Reviewed:  LAB RESULTS:      Lab 22  0600 22  0950 22  0439 22  0105   WBC 6.39 6.60  --  9.27   HEMOGLOBIN 13.6 14.1  --  14.4   HEMATOCRIT 41.4 43.0  --  42.3   PLATELETS 321 335  --  364   NEUTROS ABS 2.09 3.19  --  4.36   IMMATURE GRANS (ABS) 0.02 0.04  --   --    LYMPHS ABS 3.65* 2.67  --   --    MONOS ABS 0.42 0.46  --   --    EOS ABS 0.18 0.20  --  0.56*   MCV 88.8 89.2  --  87.8   LACTATE  --   --  1.7 1.6         Lab 22  0600 22  1750 22  0536 22  0143   SODIUM 134* 136 135* 132*   POTASSIUM 4.2 3.8 4.2 4.1   CHLORIDE 101 101 102 98   CO2 26.0 25.0 25.0 23.0   ANION GAP 7.0 10.0 8.0 11.0   BUN 13 11 14 14   CREATININE 0.92 0.84 0.82 0.86   EGFR 107.2 112.4 113.2 111.6   GLUCOSE 144* 158* 196*  231*   CALCIUM 8.3* 8.6 8.5* 8.9   MAGNESIUM  --   --  1.6  --    PHOSPHORUS  --   --  3.3  --    HEMOGLOBIN A1C  --   --  7.70*  --          Lab 03/30/22  0143   TOTAL PROTEIN 6.8   ALBUMIN 3.80   GLOBULIN 3.0   ALT (SGPT) 24   AST (SGOT) 20   BILIRUBIN 0.3   ALK PHOS 59   AMYLASE 162*   LIPASE 565*             Lab 03/30/22  1750 03/30/22  0536 03/30/22  0143   CHOLESTEROL 231*  --   --    LDL CHOL 133*  --   --    HDL CHOL 30*  --   --    TRIGLYCERIDES 374*  374* 1,120* 1,075*             Brief Urine Lab Results  (Last result in the past 365 days)      Color   Clarity   Blood   Leuk Est   Nitrite   Protein   CREAT   Urine HCG        03/18/22 1043 Yellow   Clear   Negative   Negative   Negative   Negative                 Microbiology Results Abnormal     Procedure Component Value - Date/Time    COVID PRE-OP / PRE-PROCEDURE SCREENING ORDER (NO ISOLATION) - Swab, Nasopharynx [[id number]]  (Normal) Collected: 03/30/22 0401    Lab Status: Final result Specimen: Swab from Nasopharynx Updated: 03/30/22 0439    Narrative:      The following orders were created for panel order COVID PRE-OP / PRE-PROCEDURE SCREENING ORDER (NO ISOLATION) - Swab, Nasopharynx.  Procedure                               Abnormality         Status                     ---------                               -----------         ------                     COVID-19 and FLU A/B PCR...[[id number]]  Normal              Final result                 Please view results for these tests on the individual orders.    COVID-19 and FLU A/B PCR - Swab, Nasopharynx [[id number]]  (Normal) Collected: 03/30/22 0401    Lab Status: Final result Specimen: Swab from Nasopharynx Updated: 03/30/22 0439     COVID19 Not Detected     Influenza A PCR Not Detected     Influenza B PCR Not Detected    Narrative:      Fact sheet for providers: https://www.fda.gov/media/084764/download    Fact sheet for patients: https://www.fda.gov/media/422570/download    Test performed by PCR.           No radiology results from the last 24 hrs        I have reviewed the medications:  Scheduled Meds:atorvastatin, 10 mg, Oral, Nightly  fenofibrate, 48 mg, Oral, Daily  insulin lispro, 0-7 Units, Subcutaneous, TID AC  pantoprazole, 40 mg, Oral, BID AC  sodium chloride, 10 mL, Intravenous, Q12H      Continuous Infusions:   PRN Meds:.•  acetaminophen  •  dextrose  •  dextrose  •  glucagon (human recombinant)  •  HYDROcodone-acetaminophen  •  HYDROmorphone  •  ketorolac  •  melatonin  •  ondansetron **OR** ondansetron  •  Sodium Chloride (PF)  •  sodium chloride    Assessment/Plan   Assessment & Plan     Active Hospital Problems    Diagnosis  POA   • GERD without esophagitis [K21.9]  Yes   • Hyponatremia [E87.1]  Yes   • Acute recurrent pancreatitis [K85.90]  Yes   • Alcohol abuse [F10.10]  Yes   • Hypertriglyceridemia [E78.1]  Yes   • Type 2 diabetes mellitus (HCC) [E11.9]  Yes   • Alcohol-induced acute pancreatitis, unspecified complication status [K85.20]  Yes      Resolved Hospital Problems   No resolved problems to display.        Brief Hospital Course to date:  Yg Araujo is a 42 y.o. male with history of substance use, alcohol abuse, pancreatitis, Hypertriglyceridemia, and newly diagnosed Type 2 Diabetes Mellitus who presents to [facility] ED for complaint of abdominal pain shortly after eating a cheeseburger. Patient was recently admitted to our facility on 3/18 for alcohol-induced pancreatitis. He was ultimately discharged on 3/22 in stable condition. He states he has not had a drink of alcohol since being discharged.     Acute Pancreatitis  Hypertriglyceridemia  -CT abdomen/pelvis showed Slight worsening of acute pancreatitis, with mild reactive duodenitis.  -Triglycerides >1100 on admission, significantly improved s/p insulin gtt  -RUQ ultrasound unremarkable, no stones or gallbladder pathology  -continue to advance diet as tolerated, more pain this morning, will reduce back to full liquids  -GI  following  - PRN pain control, PO/IV     ETOH abuse/dependence  -Reportedly has not had a drink since being discharged from here last week.  -CIWA protocol     Type 2 Diabetes Mellitus  -Last A1C on 3/20/22 was 7.8  -Hold home metformin  -Fingerstick ACHS. SSI now that insulin gtt has been stopped  -Diabetes educator      GERD  -Continue PPI     Hyponatremia  -now normalized    DVT prophylaxis:  Mechanical DVT prophylaxis orders are present.      Disposition: I expect the patient to be discharged today vs. Tomorrow if pain improved.    CODE STATUS:   Code Status and Medical Interventions:   Ordered at: 03/30/22 0456     Code Status (Patient has no pulse and is not breathing):    CPR (Attempt to Resuscitate)     Medical Interventions (Patient has pulse or is breathing):    Full Support       Gayatri Lara,   04/01/22

## 2022-04-02 ENCOUNTER — READMISSION MANAGEMENT (OUTPATIENT)
Dept: CALL CENTER | Facility: HOSPITAL | Age: 42
End: 2022-04-02

## 2022-04-02 VITALS
SYSTOLIC BLOOD PRESSURE: 136 MMHG | OXYGEN SATURATION: 98 % | DIASTOLIC BLOOD PRESSURE: 96 MMHG | RESPIRATION RATE: 18 BRPM | HEIGHT: 68 IN | BODY MASS INDEX: 33.34 KG/M2 | HEART RATE: 68 BPM | TEMPERATURE: 97.3 F | WEIGHT: 220 LBS

## 2022-04-02 PROBLEM — E87.1 HYPONATREMIA: Status: RESOLVED | Noted: 2022-03-30 | Resolved: 2022-04-02

## 2022-04-02 PROBLEM — K85.20 ALCOHOL-INDUCED ACUTE PANCREATITIS, UNSPECIFIED COMPLICATION STATUS: Status: RESOLVED | Noted: 2022-03-18 | Resolved: 2022-04-02

## 2022-04-02 LAB
ANION GAP SERPL CALCULATED.3IONS-SCNC: 12 MMOL/L (ref 5–15)
BASOPHILS # BLD AUTO: 0.04 10*3/MM3 (ref 0–0.2)
BASOPHILS NFR BLD AUTO: 0.7 % (ref 0–1.5)
BUN SERPL-MCNC: 10 MG/DL (ref 6–20)
BUN/CREAT SERPL: 11.8 (ref 7–25)
CALCIUM SPEC-SCNC: 9.1 MG/DL (ref 8.6–10.5)
CHLORIDE SERPL-SCNC: 102 MMOL/L (ref 98–107)
CO2 SERPL-SCNC: 24 MMOL/L (ref 22–29)
CREAT SERPL-MCNC: 0.85 MG/DL (ref 0.76–1.27)
DEPRECATED RDW RBC AUTO: 38.5 FL (ref 37–54)
EGFRCR SERPLBLD CKD-EPI 2021: 112 ML/MIN/1.73
EOSINOPHIL # BLD AUTO: 0.2 10*3/MM3 (ref 0–0.4)
EOSINOPHIL NFR BLD AUTO: 3.4 % (ref 0.3–6.2)
ERYTHROCYTE [DISTWIDTH] IN BLOOD BY AUTOMATED COUNT: 11.9 % (ref 12.3–15.4)
GLUCOSE BLDC GLUCOMTR-MCNC: 107 MG/DL (ref 70–130)
GLUCOSE BLDC GLUCOMTR-MCNC: 115 MG/DL (ref 70–130)
GLUCOSE SERPL-MCNC: 116 MG/DL (ref 65–99)
HCT VFR BLD AUTO: 42.6 % (ref 37.5–51)
HGB BLD-MCNC: 14.5 G/DL (ref 13–17.7)
IMM GRANULOCYTES # BLD AUTO: 0.02 10*3/MM3 (ref 0–0.05)
IMM GRANULOCYTES NFR BLD AUTO: 0.3 % (ref 0–0.5)
LYMPHOCYTES # BLD AUTO: 2.83 10*3/MM3 (ref 0.7–3.1)
LYMPHOCYTES NFR BLD AUTO: 47.4 % (ref 19.6–45.3)
MCH RBC QN AUTO: 29.8 PG (ref 26.6–33)
MCHC RBC AUTO-ENTMCNC: 34 G/DL (ref 31.5–35.7)
MCV RBC AUTO: 87.5 FL (ref 79–97)
MONOCYTES # BLD AUTO: 0.46 10*3/MM3 (ref 0.1–0.9)
MONOCYTES NFR BLD AUTO: 7.7 % (ref 5–12)
NEUTROPHILS NFR BLD AUTO: 2.42 10*3/MM3 (ref 1.7–7)
NEUTROPHILS NFR BLD AUTO: 40.5 % (ref 42.7–76)
NRBC BLD AUTO-RTO: 0 /100 WBC (ref 0–0.2)
PLAT MORPH BLD: NORMAL
PLATELET # BLD AUTO: 332 10*3/MM3 (ref 140–450)
PMV BLD AUTO: 11 FL (ref 6–12)
POTASSIUM SERPL-SCNC: 4 MMOL/L (ref 3.5–5.2)
RBC # BLD AUTO: 4.87 10*6/MM3 (ref 4.14–5.8)
RBC MORPH BLD: NORMAL
SODIUM SERPL-SCNC: 138 MMOL/L (ref 136–145)
WBC MORPH BLD: NORMAL
WBC NRBC COR # BLD: 5.97 10*3/MM3 (ref 3.4–10.8)

## 2022-04-02 PROCEDURE — 85007 BL SMEAR W/DIFF WBC COUNT: CPT | Performed by: PHYSICIAN ASSISTANT

## 2022-04-02 PROCEDURE — 85025 COMPLETE CBC W/AUTO DIFF WBC: CPT | Performed by: PHYSICIAN ASSISTANT

## 2022-04-02 PROCEDURE — 82962 GLUCOSE BLOOD TEST: CPT

## 2022-04-02 PROCEDURE — 80048 BASIC METABOLIC PNL TOTAL CA: CPT | Performed by: PHYSICIAN ASSISTANT

## 2022-04-02 PROCEDURE — 99239 HOSP IP/OBS DSCHRG MGMT >30: CPT | Performed by: NURSE PRACTITIONER

## 2022-04-02 PROCEDURE — 25010000002 HYDROMORPHONE PER 4 MG: Performed by: INTERNAL MEDICINE

## 2022-04-02 RX ORDER — FENOFIBRATE 48 MG/1
48 TABLET, COATED ORAL DAILY
Qty: 30 TABLET | Refills: 1 | Status: SHIPPED | OUTPATIENT
Start: 2022-04-03

## 2022-04-02 RX ORDER — ATORVASTATIN CALCIUM 10 MG/1
10 TABLET, FILM COATED ORAL NIGHTLY
Qty: 30 TABLET | Refills: 1 | Status: SHIPPED | OUTPATIENT
Start: 2022-04-02

## 2022-04-02 RX ORDER — NICOTINE 21 MG/24HR
1 PATCH, TRANSDERMAL 24 HOURS TRANSDERMAL
Qty: 30 PATCH | Refills: 1 | Status: SHIPPED | OUTPATIENT
Start: 2022-04-03

## 2022-04-02 RX ADMIN — PANTOPRAZOLE SODIUM 40 MG: 40 TABLET, DELAYED RELEASE ORAL at 09:32

## 2022-04-02 RX ADMIN — NICOTINE 1 PATCH: 21 PATCH, EXTENDED RELEASE TRANSDERMAL at 09:32

## 2022-04-02 RX ADMIN — FENOFIBRATE 48 MG: 48 TABLET ORAL at 09:32

## 2022-04-02 RX ADMIN — HYDROCODONE BITARTRATE AND ACETAMINOPHEN 1 TABLET: 5; 325 TABLET ORAL at 09:32

## 2022-04-02 RX ADMIN — HYDROMORPHONE HYDROCHLORIDE 0.5 MG: 1 INJECTION, SOLUTION INTRAMUSCULAR; INTRAVENOUS; SUBCUTANEOUS at 02:33

## 2022-04-02 NOTE — OUTREACH NOTE
Prep Survey    Flowsheet Row Responses   [facility] patient discharged from? [facility]   Is LACE score < 7 ? No   Emergency Room discharge w/ pulse ox? No   Eligibility [facility]   Date of Admission 03/30/22   Date of Discharge 04/02/22   Discharge Disposition Home or Self Care   Discharge diagnosis Acute Pancreatitis   Does the patient have one of the following disease processes/diagnoses(primary or secondary)? Other   Does the patient have Home health ordered? No   Is there a DME ordered? No   Prep survey completed? Yes          NEVILLE FRANK - Registered Nurse

## 2022-04-02 NOTE — DISCHARGE SUMMARY
[facility] Medicine Services  DISCHARGE SUMMARY    Patient Name: Yg Araujo  : 1980  MRN: [id number]    Date of Admission: 3/30/2022 12:48 AM  Date of Discharge: 2022  Primary Care Physician: Tang Rios MD    Consults     Date and Time Order Name Status Description    3/30/2022  8:12 AM Inpatient Gastroenterology Consult Completed           Hospital Course     Presenting Problem:   Acute recurrent pancreatitis [K85.90]    Active Hospital Problems    Diagnosis  POA   • GERD without esophagitis [K21.9]  Yes   • Acute recurrent pancreatitis [K85.90]  Yes   • Alcohol abuse [F10.10]  Yes   • Hypertriglyceridemia [E78.1]  Yes   • Type 2 diabetes mellitus (HCC) [E11.9]  Yes      Resolved Hospital Problems    Diagnosis Date Resolved POA   • Hyponatremia [E87.1] 2022 Yes   • Alcohol-induced acute pancreatitis, unspecified complication status [K85.20] 2022 Yes      Hospital Course:  Yg Araujo is a 42 y.o. male with history of substance use, alcohol abuse, pancreatitis, Hypertriglyceridemia, and newly diagnosed Type 2 Diabetes Mellitus who presents to [facility] ED for complaint of abdominal pain shortly after eating a cheeseburger. Patient was recently admitted to our facility on 3/18 for alcohol-induced pancreatitis. He was ultimately discharged on 3/22 in stable condition. He states he has not had a drink of alcohol since being discharged.      Acute Pancreatitis  Hypertriglyceridemia  -CT abdomen/pelvis showed Slight worsening of acute pancreatitis, with mild reactive duodenitis.  -Triglycerides >1100 on admission, significantly improved s/p insulin gtt  -RUQ ultrasound unremarkable, no stones or gallbladder pathology  -Patient unable to tolerate a regular or full liquid diet, so he was made n.p.o. yesterday afternoon; will try a regular consistent carb soft diet for lunch  -GI following  - PRN pain control, PO/IV  --Tolerated regular soft diet for  lunch and will be discharged home  --Follow-up with [facility] as needed     ETOH abuse/dependence  -Reportedly has not had a drink since being discharged from here last week.  -CIWA protocol dc'd     Type 2 Diabetes Mellitus w/A1c 7.8%  -Hold home metformin  --ACHS glucose 106-154  --SSI now that insulin gtt has been stopped  -Diabetes educator      GERD  -Continue PPI     Hyponatremia  -now normalized    Patient will be discharged home today and transported by family.  Discharge follow-up recommendations and appointments are listed below.      Discharge Follow Up Recommendations for outpatient labs/diagnostics:  --Follow-up with your family doctor in 1 week  --Follow-up with [facility] as needed  --Do not drink any type of alcohol  --Take your fenofibrate and Lipitor as prescribed  --Continue regular soft diet and advance as tolerated  Day of Discharge     HPI:   Patient sitting up in bed in NAD.  Patient had pain when he was eating a regular diet and then full liquid diet yesterday, so he has been n.p.o. since and states he is got no abdominal pain at all.  Patient wants to attempt to eat regular diet for lunch and be discharged home.    Addendum: Patient tolerated diet well and wants to be discharged home today    Review of Systems  Gen- No fevers, chills  CV- No chest pain, palpitations  Resp- No cough, dyspnea  GI- No N/V/D, abd pain  All other systems have been reviewed and the pertinent positives and negatives are listed above in the HPI or ROS     Vital Signs:   Temp:  [97.2 °F (36.2 °C)-98.4 °F (36.9 °C)] 97.3 °F (36.3 °C)  Heart Rate:  [54-72] 65  Resp:  [18] 18  BP: (114-152)/() 136/96      Physical Exam:  Constitutional: Alert, WD, WN male sitting up in bed in NAD  Eyes: EOMI, sclerae anicteric, no conjunctival injection  Head: NCAT  ENT: [place], moist mucous membranes   Respiratory: Nonlabored, symmetrical chest expansion, CTAB  Cardiovascular: bradycardia, HR 55, no M/R/G, +DP pulses  bilaterally  Gastrointestinal: Soft, NT, ND +BS-hypoactive  Musculoskeletal: RUIZ; no LE edema bilaterally  Neurologic: Oriented x4, strength symmetric in all extremities, follows all commands, CN II-XII intact, speech clear  Skin: No rashes on exposed skin  Psychiatric: Pleasant and cooperative; normal affect    Pertinent  and/or Most Recent Results     LAB RESULTS:      Lab 04/02/22  0614 04/01/22  0600 03/31/22  0950 03/30/22  0439 03/30/22  0105   WBC 5.97 6.39 6.60  --  9.27   HEMOGLOBIN 14.5 13.6 14.1  --  14.4   HEMATOCRIT 42.6 41.4 43.0  --  42.3   PLATELETS 332 321 335  --  364   NEUTROS ABS 2.42 2.09 3.19  --  4.36   IMMATURE GRANS (ABS) 0.02 0.02 0.04  --   --    LYMPHS ABS 2.83 3.65* 2.67  --   --    MONOS ABS 0.46 0.42 0.46  --   --    EOS ABS 0.20 0.18 0.20  --  0.56*   MCV 87.5 88.8 89.2  --  87.8   LACTATE  --   --   --  1.7 1.6         Lab 04/02/22  0614 04/01/22  0600 03/30/22  1750 03/30/22  0536 03/30/22  0143   SODIUM 138 134* 136 135* 132*   POTASSIUM 4.0 4.2 3.8 4.2 4.1   CHLORIDE 102 101 101 102 98   CO2 24.0 26.0 25.0 25.0 23.0   ANION GAP 12.0 7.0 10.0 8.0 11.0   BUN 10 13 11 14 14   CREATININE 0.85 0.92 0.84 0.82 0.86   EGFR 112.0 107.2 112.4 113.2 111.6   GLUCOSE 116* 144* 158* 196* 231*   CALCIUM 9.1 8.3* 8.6 8.5* 8.9   MAGNESIUM  --   --   --  1.6  --    PHOSPHORUS  --   --   --  3.3  --    HEMOGLOBIN A1C  --   --   --  7.70*  --          Lab 03/30/22  0143   TOTAL PROTEIN 6.8   ALBUMIN 3.80   GLOBULIN 3.0   ALT (SGPT) 24   AST (SGOT) 20   BILIRUBIN 0.3   ALK PHOS 59   AMYLASE 162*   LIPASE 565*             Lab 03/30/22  1750 03/30/22  0536 03/30/22  0143   CHOLESTEROL 231*  --   --    LDL CHOL 133*  --   --    HDL CHOL 30*  --   --    TRIGLYCERIDES 374*  374* 1,120* 1,075*             Brief Urine Lab Results  (Last result in the past 365 days)      Color   Clarity   Blood   Leuk Est   Nitrite   Protein   CREAT   Urine HCG        03/18/22 1043 Yellow   Clear   Negative   Negative    Negative   Negative               Microbiology Results (last 10 days)     Procedure Component Value - Date/Time    COVID PRE-OP / PRE-PROCEDURE SCREENING ORDER (NO ISOLATION) - Swab, Nasopharynx [[id number]]  (Normal) Collected: 03/30/22 0401    Lab Status: Final result Specimen: Swab from Nasopharynx Updated: 03/30/22 0439    Narrative:      The following orders were created for panel order COVID PRE-OP / PRE-PROCEDURE SCREENING ORDER (NO ISOLATION) - Swab, Nasopharynx.  Procedure                               Abnormality         Status                     ---------                               -----------         ------                     COVID-19 and FLU A/B PCR...[[id number]]  Normal              Final result                 Please view results for these tests on the individual orders.    COVID-19 and FLU A/B PCR - Swab, Nasopharynx [[id number]]  (Normal) Collected: 03/30/22 0401    Lab Status: Final result Specimen: Swab from Nasopharynx Updated: 03/30/22 0439     COVID19 Not Detected     Influenza A PCR Not Detected     Influenza B PCR Not Detected    Narrative:      Fact sheet for providers: https://www.fda.gov/media/221947/download    Fact sheet for patients: https://www.fda.gov/media/577882/download    Test performed by PCR.          CT Abdomen Pelvis With Contrast    Result Date: 3/30/2022  EXAM: CT SCAN OF THE ABDOMEN AND PELVIS WITH INTRAVENOUS CONTRAST DATE: 3/30/2022 3:00 AM HISTORY: upper abd pain, ETOH abuse, h/o pancreatitis, n/v TECHNIQUE:  CT examination of the abdomen and pelvis was performed following the intravenous administration of 90 mL Isovue-370 . CT dose lowering techniques were used, to include: automated exposure control, adjustment for patient size, and/or use of iterative reconstruction. COMPARISON:  3/18/2022. FINDINGS: ABDOMEN/PELVIS: LOWER CHEST: Normal. LIVER: Normal.  GALLBLADDER/BILIARY: Normal gallbladder PANCREAS: Inflammation and stranding near the pancreatic head. SPLEEN:  Normal.  ADRENAL GLANDS: Normal. KIDNEYS/URETERS/BLADDER: Normal kidneys. Normal bladder GI TRACT: Mild duodenal thickening. Normal appendix (series 2/109) MESENTERY/PERITONEUM: Normal mesentery REPRODUCTIVE: Normal pelvis. VASCULATURE: Normal.   LYMPH NODES: Normal ABDOMINAL WALL: Normal. MUSCULOSKELETAL: Normal.     1.  Slight worsening of acute pancreatitis, with mild reactive duodenitis. Electronically signed by:  Marychuy Dc M.D.  3/30/2022 1:28 AM Mountain Time    US Gallbladder    Result Date: 3/30/2022  US GALLBLADDER-  Date of Exam: 3/30/2022 7:15 AM  Indication: RUQ pain; K85.90-Acute pancreatitis without necrosis or infection, unspecified; E78.1-Pure hyperglyceridemia; R10.11-Right upper quadrant pain; Z86.39-Personal history of other endocrine, nutritional and metabolic disease; Z86.79-Personal history of other diseases of the circulatory system; Z72.0-Tobacco use; Z87.898-Personal history of other specified conditions; K29.80-Duodenitis without bleeding.  Comparison: CT abdomen pelvis from earlier today  Technique: Right upper quadrant ultrasound  FINDINGS:  There are inflammatory changes along the pancreatic head, better characterized on recent prior CT. The liver appears normal in contour and echotexture, with no focal mass identified. The hepatic vasculature appears within normal limits. The gallbladder is sonographically normal, with no stones, wall thickening, pericholecystic fluid, or sonographic Avelar's sign. The common bile duct is normal in caliber measuring 3.1 mm. The right kidney is normal in size and echogenicity, measuring 10.9 cm, with no stone, mass, or hydronephrosis identified.      1.  Gallbladder appears within normal limits. 2.  Inflammatory changes along pancreatic head, better characterized on recent prior CT.  This report was finalized on 3/30/2022 7:52 AM by Doe Pierce MD.      Plan for Follow-up of Pending Labs/Results:     Discharge Details        Discharge  Medications      New Medications      Instructions Start Date   atorvastatin 10 MG tablet  Commonly known as: LIPITOR   10 mg, Oral, Nightly      fenofibrate 48 MG tablet  Commonly known as: TRICOR   48 mg, Oral, Daily   Start Date: April 3, 2022     nicotine 21 MG/24HR patch  Commonly known as: NICODERM CQ   1 patch, Transdermal, Every 24 Hours Scheduled   Start Date: April 3, 2022        Continue These Medications      Instructions Start Date   metFORMIN  MG 24 hr tablet  Commonly known as: GLUCOPHAGE-XR   500 mg, Oral, Daily With Breakfast      ondansetron 4 MG tablet  Commonly known as: Zofran   4 mg, Oral, Every 6 Hours PRN      oxyCODONE-acetaminophen 5-325 MG per tablet  Commonly known as: PERCOCET   1 tablet, Oral, Every 6 Hours PRN      pantoprazole 40 MG EC tablet  Commonly known as: PROTONIX   40 mg, Oral, 2 Times Daily Before Meals      traMADol 50 MG tablet  Commonly known as: ULTRAM   50 mg, Oral, Every 6 Hours PRN             No Known Allergies      Discharge Disposition:  Home or Self Care    Diet:  Hospital:  Diet Order   Procedures   • Diet Soft Texture; Whole Foods; Thin; Consistent Carbohydrate       Activity:  Activity Instructions     Activity as Tolerated            Restrictions or Other Recommendations:  None       CODE STATUS:    Code Status and Medical Interventions:   Ordered at: 03/30/22 0456     Code Status (Patient has no pulse and is not breathing):    CPR (Attempt to Resuscitate)     Medical Interventions (Patient has pulse or is breathing):    Full Support       Additional Instructions for the Follow-ups that You Need to Schedule     Discharge Follow-up with PCP   As directed       Currently Documented PCP:    Tang Rios MD    PCP Phone Number:    [phone]     Follow Up Details: 1 week; please call the office on Monday to schedule an appointment               JIM Spangler  04/02/22      Time Spent on Discharge:  I spent 45  minutes on this discharge activity  which included: face-to-face encounter with the patient, reviewing the data in the system, coordination of the care with the nursing staff as well as consultants, documentation, and entering orders.

## 2022-04-02 NOTE — DISCHARGE INSTR - APPOINTMENTS
Please call to schedule an appointment to be seen by your PCP within 1 week of discharge.  Tang Rios MD   General Internal Medicine  [phone]  [address]  [place] [id number]

## 2022-04-02 NOTE — PLAN OF CARE
Pt vss, on RA, up ad berny, AxO, NSR. Pt given dilaudid x2 this shift for pain with relief. NPO. Pt has no current complaints.

## 2022-04-02 NOTE — PROGRESS NOTES
[facility] Medicine Services  PROGRESS NOTE    Patient Name: Yg Araujo  : 1980  MRN: [id number]    Date of Admission: 3/30/2022  Primary Care Physician: Tang Rios MD    Subjective   Subjective   CC:  F/u pancreatitis    HPI:  Patient sitting up in bed in NAD.  Patient had pain when he was eating a regular diet and then full liquid diet yesterday, so he has been n.p.o. since and states he is got no abdominal pain at all.  Patient wants to attempt to eat regular diet for lunch and be discharged home.    ROS:  Gen- No fevers, chills  CV- No chest pain, palpitations  Resp- No cough, dyspnea  GI- No N/V/D, abd pain  All other systems have been reviewed and the pertinent positives and negatives are listed above in the HPI or ROS     Objective   Objective     Vital Signs:   Temp:  [97.2 °F (36.2 °C)-98.4 °F (36.9 °C)] 97.2 °F (36.2 °C)  Heart Rate:  [52-72] 68  Resp:  [17-18] 18  BP: (108-152)/() 152/102     Physical Exam:  Constitutional: Alert, WD, WN male sitting up in bed in NAD  Eyes: EOMI, sclerae anicteric, no conjunctival injection  Head: NCAT  ENT: [place], moist mucous membranes   Respiratory: Nonlabored, symmetrical chest expansion, CTAB  Cardiovascular: bradycardia, HR 55, no M/R/G, +DP pulses bilaterally  Gastrointestinal: Soft, NT, ND +BS-hypoactive  Musculoskeletal: RUIZ; no LE edema bilaterally  Neurologic: Oriented x4, strength symmetric in all extremities, follows all commands, CN II-XII intact, speech clear  Skin: No rashes on exposed skin  Psychiatric: Pleasant and cooperative; normal affect    Results Reviewed:  LAB RESULTS:      Lab 22  0614 22  0600 22  0950 22  0439 22  0105   WBC 5.97 6.39 6.60  --  9.27   HEMOGLOBIN 14.5 13.6 14.1  --  14.4   HEMATOCRIT 42.6 41.4 43.0  --  42.3   PLATELETS 332 321 335  --  364   NEUTROS ABS 2.42 2.09 3.19  --  4.36   IMMATURE GRANS (ABS) 0.02 0.02 0.04  --   --    LYMPHS ABS 2.83  3.65* 2.67  --   --    MONOS ABS 0.46 0.42 0.46  --   --    EOS ABS 0.20 0.18 0.20  --  0.56*   MCV 87.5 88.8 89.2  --  87.8   LACTATE  --   --   --  1.7 1.6         Lab 04/02/22  0614 04/01/22  0600 03/30/22  1750 03/30/22  0536 03/30/22  0143   SODIUM 138 134* 136 135* 132*   POTASSIUM 4.0 4.2 3.8 4.2 4.1   CHLORIDE 102 101 101 102 98   CO2 24.0 26.0 25.0 25.0 23.0   ANION GAP 12.0 7.0 10.0 8.0 11.0   BUN 10 13 11 14 14   CREATININE 0.85 0.92 0.84 0.82 0.86   EGFR 112.0 107.2 112.4 113.2 111.6   GLUCOSE 116* 144* 158* 196* 231*   CALCIUM 9.1 8.3* 8.6 8.5* 8.9   MAGNESIUM  --   --   --  1.6  --    PHOSPHORUS  --   --   --  3.3  --    HEMOGLOBIN A1C  --   --   --  7.70*  --          Lab 03/30/22  0143   TOTAL PROTEIN 6.8   ALBUMIN 3.80   GLOBULIN 3.0   ALT (SGPT) 24   AST (SGOT) 20   BILIRUBIN 0.3   ALK PHOS 59   AMYLASE 162*   LIPASE 565*             Lab 03/30/22  1750 03/30/22  0536 03/30/22  0143   CHOLESTEROL 231*  --   --    LDL CHOL 133*  --   --    HDL CHOL 30*  --   --    TRIGLYCERIDES 374*  374* 1,120* 1,075*             Brief Urine Lab Results  (Last result in the past 365 days)      Color   Clarity   Blood   Leuk Est   Nitrite   Protein   CREAT   Urine HCG        03/18/22 1043 Yellow   Clear   Negative   Negative   Negative   Negative                 Microbiology Results Abnormal     Procedure Component Value - Date/Time    COVID PRE-OP / PRE-PROCEDURE SCREENING ORDER (NO ISOLATION) - Swab, Nasopharynx [[id number]]  (Normal) Collected: 03/30/22 0401    Lab Status: Final result Specimen: Swab from Nasopharynx Updated: 03/30/22 2020    Narrative:      The following orders were created for panel order COVID PRE-OP / PRE-PROCEDURE SCREENING ORDER (NO ISOLATION) - Swab, Nasopharynx.  Procedure                               Abnormality         Status                     ---------                               -----------         ------                     COVID-19 and FLU A/B PCR...[[id number]]  Normal               Final result                 Please view results for these tests on the individual orders.    COVID-19 and FLU A/B PCR - Swab, Nasopharynx [[id number]]  (Normal) Collected: 03/30/22 0401    Lab Status: Final result Specimen: Swab from Nasopharynx Updated: 03/30/22 0439     COVID19 Not Detected     Influenza A PCR Not Detected     Influenza B PCR Not Detected    Narrative:      Fact sheet for providers: https://www.fda.gov/media/309533/download    Fact sheet for patients: https://www.fda.gov/media/744787/download    Test performed by PCR.          No radiology results from the last 24 hrs        I have reviewed the medications:  Scheduled Meds:atorvastatin, 10 mg, Oral, Nightly  fenofibrate, 48 mg, Oral, Daily  insulin lispro, 0-7 Units, Subcutaneous, TID AC  nicotine, 1 patch, Transdermal, Q24H  pantoprazole, 40 mg, Oral, BID AC  sodium chloride, 10 mL, Intravenous, Q12H      Continuous Infusions:   PRN Meds:.•  acetaminophen  •  dextrose  •  dextrose  •  glucagon (human recombinant)  •  HYDROcodone-acetaminophen  •  HYDROmorphone  •  ketorolac  •  melatonin  •  nicotine  •  ondansetron **OR** ondansetron  •  Sodium Chloride (PF)  •  sodium chloride    Assessment/Plan   Assessment & Plan     Active Hospital Problems    Diagnosis  POA   • GERD without esophagitis [K21.9]  Yes   • Hyponatremia [E87.1]  Yes   • Acute recurrent pancreatitis [K85.90]  Yes   • Alcohol abuse [F10.10]  Yes   • Hypertriglyceridemia [E78.1]  Yes   • Type 2 diabetes mellitus (HCC) [E11.9]  Yes   • Alcohol-induced acute pancreatitis, unspecified complication status [K85.20]  Yes      Resolved Hospital Problems   No resolved problems to display.        Brief Hospital Course to date:  Yg Araujo is a 42 y.o. male with history of substance use, alcohol abuse, pancreatitis, Hypertriglyceridemia, and newly diagnosed Type 2 Diabetes Mellitus who presents to [facility] ED for complaint of abdominal pain shortly after eating a cheeseburger.  Patient was recently admitted to our facility on 3/18 for alcohol-induced pancreatitis. He was ultimately discharged on 3/22 in stable condition. He states he has not had a drink of alcohol since being discharged.     Acute Pancreatitis  Hypertriglyceridemia  -CT abdomen/pelvis showed Slight worsening of acute pancreatitis, with mild reactive duodenitis.  -Triglycerides >1100 on admission, significantly improved s/p insulin gtt  -RUQ ultrasound unremarkable, no stones or gallbladder pathology  -Patient unable to tolerate a regular or full liquid diet, so he was made n.p.o. yesterday afternoon; will try a regular consistent carb soft diet for lunch  -GI following  - PRN pain control, PO/IV  --Hopefully discharge after lunch     ETOH abuse/dependence  -Reportedly has not had a drink since being discharged from here last week.  -CIWA protocol dc'd     Type 2 Diabetes Mellitus w/A1c 7.8%  -Hold home metformin  --[facility] glucose 106-154  --SSI now that insulin gtt has been stopped  -Diabetes educator      GERD  -Continue PPI     Hyponatremia  -now normalized    DVT prophylaxis:  Mechanical DVT prophylaxis orders are present.      Disposition: I expect the patient to be discharged today if patient can tolerate lunch    CODE STATUS:   Code Status and Medical Interventions:   Ordered at: 03/30/22 0456     Code Status (Patient has no pulse and is not breathing):    CPR (Attempt to Resuscitate)     Medical Interventions (Patient has pulse or is breathing):    Full Support       Ernestina Miller, JIM  04/02/22

## 2022-04-04 ENCOUNTER — TRANSITIONAL CARE MANAGEMENT TELEPHONE ENCOUNTER (OUTPATIENT)
Dept: CALL CENTER | Facility: HOSPITAL | Age: 42
End: 2022-04-04

## 2022-04-04 NOTE — OUTREACH NOTE
Call Center TCM Note    Flowsheet Row Responses   [facility] patient discharged from? [place]   Does the patient have one of the following disease processes/diagnoses(primary or secondary)? Other   TCM attempt successful? Yes   Call start time 1510   Call end time 1512   Discharge diagnosis Acute Pancreatitis   Meds reviewed with patient/caregiver? Yes   Is the patient having any side effects they believe may be caused by any medication additions or changes? No   Does the patient have all medications ordered at discharge? Yes   Is the patient taking all medications as directed (includes completed medication regime)? Yes   Does the patient have a primary care provider?  Yes   Does the patient have an appointment with their PCP within 7 days of discharge? No   Comments regarding PCP DECLINED SCHEDULING A PCP FOLLOW UP APPOINTMENT DURING THIS CALL.    What is preventing the patient from scheduling follow up appointments within 7 days of discharge? Haven't had time   Nursing Interventions Advised patient to make appointment, Educated patient on importance of making appointment   Has the patient kept scheduled appointments due by today? N/A   Has home health visited the patient within 72 hours of discharge? N/A   Psychosocial issues? No   Did the patient receive a copy of their discharge instructions? Yes   Nursing interventions Reviewed instructions with patient   What is the patient's perception of their health status since discharge? Improving   Is the patient/caregiver able to teach back signs and symptoms related to disease process for when to call PCP? Yes   Is the patient/caregiver able to teach back signs and symptoms related to disease process for when to call 911? Yes   Is the patient/caregiver able to teach back the hierarchy of who to call/visit for symptoms/problems? PCP, Specialist, Home health nurse, Urgent Care, ED, 911 Yes   If the patient is a current smoker, are they able to teach back  resources for cessation? Smoking cessation medications   TCM call completed? Yes          Christina De Leon LPN    4/4/2022, 15:18 EDT

## 2022-04-13 ENCOUNTER — READMISSION MANAGEMENT (OUTPATIENT)
Dept: CALL CENTER | Facility: HOSPITAL | Age: 42
End: 2022-04-13

## 2022-04-13 NOTE — OUTREACH NOTE
Medical Week 2 Survey    Flowsheet Row Responses   [facility] patient discharged from? [place]   Does the patient have one of the following disease processes/diagnoses(primary or secondary)? Other   Week 2 attempt successful? Yes   Call start time 0921   Discharge diagnosis Acute Pancreatitis   Call end time 0934   Meds reviewed with patient/caregiver? Yes   Is the patient having any side effects they believe may be caused by any medication additions or changes? No   Does the patient have all medications ordered at discharge? Yes   Is the patient taking all medications as directed (includes completed medication regime)? Yes   Does the patient have a primary care provider?  Yes   Does the patient have an appointment with their PCP within 7 days of discharge? No   What is preventing the patient from scheduling follow up appointments within 7 days of discharge? Haven't had time   Nursing Interventions Educated patient on importance of making appointment, Advised patient to make appointment   Has the patient kept scheduled appointments due by today? N/A   Has home health visited the patient within 72 hours of discharge? N/A   Psychosocial issues? No   Did the patient receive a copy of their discharge instructions? Yes   Nursing interventions Reviewed instructions with patient   What is the patient's perception of their health status since discharge? Improving   Is the patient/caregiver able to teach back signs and symptoms related to disease process for when to call PCP? Yes   Is the patient/caregiver able to teach back signs and symptoms related to disease process for when to call 911? Yes   Is the patient/caregiver able to teach back the hierarchy of who to call/visit for symptoms/problems? PCP, Specialist, Home health nurse, Urgent Care, ED, 911 Yes   If the patient is a current smoker, are they able to teach back resources for cessation? Smoking cessation medications   Week 2 Call Completed? Yes   Wrap up  additional comments Patient reports he is doing well. No further complaints of abdominal pain and he is following his diet well.           MARIANNE ROME - Registered Nurse

## 2022-04-21 ENCOUNTER — READMISSION MANAGEMENT (OUTPATIENT)
Dept: CALL CENTER | Facility: HOSPITAL | Age: 42
End: 2022-04-21

## 2022-04-21 NOTE — OUTREACH NOTE
Medical Week 3 Survey    Flowsheet Row Responses   [facility] patient discharged from? [facility]   Does the patient have one of the following disease processes/diagnoses(primary or secondary)? Other   Week 3 attempt successful? Yes   Call start time 1003   Call end time 1007   Discharge diagnosis Acute Pancreatitis   Meds reviewed with patient/caregiver? Yes   Is the patient having any side effects they believe may be caused by any medication additions or changes? No   Is the patient taking all medications as directed (includes completed medication regime)? Yes   Does the patient have a primary care provider?  Yes   Does the patient have an appointment with their PCP within 7 days of discharge? No   What is preventing the patient from scheduling follow up appointments within 7 days of discharge? Haven't had time  [pt stated his co pay is high and he is trying to get caught up from being in the hospital financially and with work]   Nursing Interventions Educated patient on importance of making appointment   Has the patient kept scheduled appointments due by today? N/A   Has home health visited the patient within 72 hours of discharge? N/A   Psychosocial issues? No   What is the patient's perception of their health status since discharge? Improving   Is the patient/caregiver able to teach back the hierarchy of who to call/visit for symptoms/problems? PCP, Specialist, Home health nurse, Urgent Care, ED, 911 Yes   Week 3 Call Completed? Yes   Wrap up additional comments pt states has had no abd pain/fever since being in hospital. Pt does c/o lack of energy/being tired but states gets plenty of rest. Enc pt to f/u with PCP          ROWDY FRANKLIN - Registered Nurse

## 2022-12-14 ENCOUNTER — APPOINTMENT (OUTPATIENT)
Dept: GENERAL RADIOLOGY | Facility: HOSPITAL | Age: 42
End: 2022-12-14

## 2022-12-14 ENCOUNTER — HOSPITAL ENCOUNTER (EMERGENCY)
Facility: HOSPITAL | Age: 42
Discharge: HOME | End: 2022-12-14
Attending: EMERGENCY MEDICINE | Admitting: EMERGENCY MEDICINE

## 2022-12-14 VITALS
DIASTOLIC BLOOD PRESSURE: 98 MMHG | HEART RATE: 80 BPM | RESPIRATION RATE: 18 BRPM | BODY MASS INDEX: 30.31 KG/M2 | WEIGHT: 200 LBS | SYSTOLIC BLOOD PRESSURE: 126 MMHG | OXYGEN SATURATION: 97 % | TEMPERATURE: 98.1 F | HEIGHT: 68 IN

## 2022-12-14 DIAGNOSIS — J40 BRONCHITIS: Primary | ICD-10-CM

## 2022-12-14 DIAGNOSIS — Z72.0 TOBACCO USE: ICD-10-CM

## 2022-12-14 LAB
FLUAV RNA RESP QL NAA+PROBE: NOT DETECTED
FLUBV RNA RESP QL NAA+PROBE: NOT DETECTED
SARS-COV-2 RNA RESP QL NAA+PROBE: NOT DETECTED

## 2022-12-14 PROCEDURE — 99283 EMERGENCY DEPT VISIT LOW MDM: CPT

## 2022-12-14 PROCEDURE — 71045 X-RAY EXAM CHEST 1 VIEW: CPT

## 2022-12-14 PROCEDURE — 87636 SARSCOV2 & INF A&B AMP PRB: CPT | Performed by: PHYSICIAN ASSISTANT

## 2022-12-14 PROCEDURE — C9803 HOPD COVID-19 SPEC COLLECT: HCPCS | Performed by: PHYSICIAN ASSISTANT

## 2022-12-14 RX ORDER — IBUPROFEN 800 MG/1
800 TABLET ORAL ONCE
Status: COMPLETED | OUTPATIENT
Start: 2022-12-14 | End: 2022-12-14

## 2022-12-14 RX ORDER — METHYLPREDNISOLONE 4 MG/1
TABLET ORAL
Qty: 1 EACH | Refills: 0 | Status: SHIPPED | OUTPATIENT
Start: 2022-12-14

## 2022-12-14 RX ORDER — ALBUTEROL SULFATE 90 UG/1
2 AEROSOL, METERED RESPIRATORY (INHALATION) EVERY 6 HOURS PRN
Qty: 3.7 G | Refills: 0 | Status: SHIPPED | OUTPATIENT
Start: 2022-12-14 | End: 2022-12-21

## 2022-12-14 RX ORDER — DOXYCYCLINE 100 MG/1
100 CAPSULE ORAL 2 TIMES DAILY
Qty: 20 CAPSULE | Refills: 0 | Status: SHIPPED | OUTPATIENT
Start: 2022-12-14 | End: 2022-12-24

## 2022-12-14 RX ORDER — BENZONATATE 200 MG/1
200 CAPSULE ORAL 3 TIMES DAILY PRN
Qty: 12 CAPSULE | Refills: 0 | Status: SHIPPED | OUTPATIENT
Start: 2022-12-14 | End: 2022-12-26

## 2022-12-14 RX ADMIN — IBUPROFEN 800 MG: 800 TABLET, FILM COATED ORAL at 19:58

## 2022-12-14 NOTE — ED PROVIDER NOTES
Subjective   History of Present Illness  Pt is a 43 yo male presenting to ED with complaints of cough and congestion. PMHx significant for HTN, HLD, GERD, Diverticulitis, Pancreatitis and ETOH abuse (states none since last admission 4/2022). Pt complains of nasal congestion, productive green sputum cough, sinus pressure and bilateral eye drainage in the morning for the past 3 weeks. He reports no improvement in symptoms with OTC meds. He denies specific sick exposure. He is not vaccianted for Covid or Flu. He denies headache, dizziness, neck pain / stiffness, sore throat, CP, SOB, N/V/D, abdominal pain or leg swelling. He uses tobacco.     History provided by:  Patient and medical records      Review of Systems   Constitutional: Positive for fatigue. Negative for chills and fever.   HENT: Positive for congestion and sinus pressure. Negative for ear pain, sore throat and trouble swallowing.    Eyes: Positive for discharge. Negative for pain, redness and visual disturbance.   Respiratory: Positive for cough. Negative for shortness of breath.    Cardiovascular: Negative for chest pain and leg swelling.   Gastrointestinal: Negative for abdominal pain, diarrhea, nausea and vomiting.   Genitourinary: Negative for difficulty urinating, dysuria and flank pain.   Musculoskeletal: Negative for arthralgias and back pain.   Skin: Negative.  Negative for rash and wound.   Allergic/Immunologic: Negative.    Neurological: Negative for dizziness, syncope, weakness, numbness and headaches.   Psychiatric/Behavioral: Negative for confusion.   All other systems reviewed and are negative.      Past Medical History:   Diagnosis Date   • Alcohol abuse    • Diverticulitis    • GERD (gastroesophageal reflux disease)    • HLD (hyperlipidemia)    • HTN (hypertension)    • Pancreatitis    • Pancreatitis    • Polysubstance abuse (HCC)    • Type 2 diabetes mellitus (HCC)        No Known Allergies    History reviewed. No pertinent surgical  history.    Family History   Problem Relation Age of Onset   • Hypertension Father    • Heart disease Father    • Diabetes Father        Social History     Socioeconomic History   • Marital status: Single   Tobacco Use   • Smoking status: Every Day     Packs/day: 1.50     Types: Cigarettes     Start date: 1/1/1995   • Smokeless tobacco: Never   Substance and Sexual Activity   • Alcohol use: Yes     Alcohol/week: 24.0 standard drinks     Types: 24 Cans of beer per week     Comment: 6 pack every other day   • Drug use: Yes     Types: Marijuana, Cocaine(coke)     Comment: daily- Hasn't done cocaine since beginning of April 2017   • Sexual activity: Defer           Objective   Physical Exam  Vitals and nursing note reviewed.   Constitutional:       Appearance: He is well-developed.   HENT:      Head: Atraumatic.      Nose: Nose normal.   Eyes:      General: Lids are normal. Lids are everted, no foreign bodies appreciated. Vision grossly intact.         Right eye: No discharge.         Left eye: No discharge.      Extraocular Movements: Extraocular movements intact.      Conjunctiva/sclera: Conjunctivae normal.      Pupils: Pupils are equal, round, and reactive to light.   Cardiovascular:      Rate and Rhythm: Normal rate and regular rhythm.      Heart sounds: Normal heart sounds.   Pulmonary:      Effort: Pulmonary effort is normal.      Breath sounds: Normal breath sounds.   Abdominal:      General: There is no distension.      Palpations: Abdomen is soft.      Tenderness: There is no abdominal tenderness. There is no guarding or rebound.   Musculoskeletal:         General: No tenderness or deformity. Normal range of motion.      Cervical back: Normal range of motion and neck supple.   Skin:     General: Skin is warm and dry.   Neurological:      Mental Status: He is alert and oriented to person, place, and time.      Sensory: No sensory deficit.   Psychiatric:         Behavior: Behavior normal.         Procedures      "      ED Course      Discussed results and tx plan. Vitals stable in ED. Due to symptoms of productive cough for 3 weeks and tobacco use will cover with Doxycyline and Medrol pack. Went over new / worse sx to return to ED.     Reviewed old records.     Recent Results (from the past 24 hour(s))   COVID-19 and FLU A/B PCR - Swab, Nasopharynx    Collection Time: 12/14/22  6:43 PM    Specimen: Nasopharynx; Swab   Result Value Ref Range    COVID19 Not Detected Not Detected - Ref. Range    Influenza A PCR Not Detected Not Detected    Influenza B PCR Not Detected Not Detected     Note: In addition to lab results from this visit, the labs listed above may include labs taken at another facility or during a different encounter within the last 24 hours. Please correlate lab times with ED admission and discharge times for further clarification of the services performed during this visit.    XR Chest 1 View   Final Result   No radiographic evidence of acute cardiopulmonary process.       This report was finalized on 12/14/2022 8:01 PM by Kobi Paul.            Vitals:    12/14/22 1750   BP: 126/98   Pulse: 80   Resp: 18   Temp: 98.1 °F (36.7 °C)   SpO2: 97%   Weight: 90.7 kg (200 lb)   Height: 172.7 cm (68\")     Medications   ibuprofen (ADVIL,MOTRIN) tablet 800 mg (800 mg Oral Given 12/14/22 1958)     ECG/EMG Results (last 24 hours)     ** No results found for the last 24 hours. **        No orders to display       DISCHARGE    Patient discharged in stable condition.    Reviewed implications of results, diagnosis, meds, responsibility to follow up, warning signs and symptoms of possible worsening, potential complications and reasons to return to ER.    Patient/Family voiced understanding of above instructions.    Discussed plan for discharge, as there is no emergent indication for admission.  Pt/family is agreeable and understands need for follow up and possible repeat testing.  Pt/family is aware that discharge does not " mean that nothing is wrong but that it indicates no emergency is currently present that requires admission and they must continue care with follow-up as given below or with a physician of their choice.     FOLLOW-UP  PATIENT CONNECTION - [place] [address]  [phone]    Call and establish a primary care doctor.    [facility] Emergency Department  [address] [id number] [phone]    If symptoms worsen         Medication List      New Prescriptions    albuterol sulfate  (90 Base) MCG/ACT inhaler  Commonly known as: PROVENTIL HFA;VENTOLIN HFA;PROAIR HFA  Inhale 2 puffs Every 6 (Six) Hours As Needed for Wheezing for up to 7 days.     benzonatate 200 MG capsule  Commonly known as: TESSALON  Take 1 capsule by mouth 3 (Three) Times a Day As Needed for Cough for up to 12 days.     doxycycline 100 MG capsule  Commonly known as: MONODOX  Take 1 capsule by mouth 2 (Two) Times a Day for 10 days.     methylPREDNISolone 4 MG dose pack  Commonly known as: MEDROL  Take as directed on package instructions.           Where to Get Your Medications      These medications were sent to [id number] - [place], [place] - [address] [facility] [address] [facility] [phone] [facility] [phone] [place] [address] [address] [facility] [address]    Phone: [phone]   · albuterol sulfate  (90 Base) MCG/ACT inhaler  · benzonatate 200 MG capsule  · doxycycline 100 MG capsule  · methylPREDNISolone 4 MG dose pack                                            MDM    Final diagnoses:   Bronchitis   Tobacco use       ED Disposition  ED Disposition     ED Disposition   Discharge    Condition   Stable    Comment   --             PATIENT CONNECTION - [place] [address]  [phone]    Call and establish a primary care doctor.    [facility] Emergency Department  [address] [place] [address]  [address]  [phone]    If symptoms worsen         Medication List      New Prescriptions    albuterol sulfate  (90 Base) MCG/ACT inhaler  Commonly known as: PROVENTIL HFA;VENTOLIN HFA;PROAIR HFA  Inhale 2 puffs Every 6 (Six) Hours As Needed for Wheezing for up to 7 days.     benzonatate 200 MG capsule  Commonly known as: TESSALON  Take 1 capsule by mouth 3 (Three) Times a Day As Needed for Cough for up to 12 days.     doxycycline 100 MG capsule  Commonly known as: MONODOX  Take 1 capsule by mouth 2 (Two) Times a Day for 10 days.     methylPREDNISolone 4 MG dose pack  Commonly known as: MEDROL  Take as directed on package instructions.           Where to Get Your Medications      These medications were sent to [facility] #[id number] - [place], [place] - [address] [facility] [phone]  - [phone]   [address] [facility] [address]    Phone: [phone]   · albuterol sulfate  (90 Base) MCG/ACT inhaler  · benzonatate 200 MG capsule  · doxycycline 100 MG capsule  · methylPREDNISolone 4 MG dose pack          Katharina Kaur PA  12/14/22 2007

## 2023-05-04 RX ORDER — METFORMIN HYDROCHLORIDE 500 MG/1
TABLET, EXTENDED RELEASE ORAL
Qty: 90 TABLET | Refills: 0 | OUTPATIENT
Start: 2023-05-04

## 2023-08-01 ENCOUNTER — HOSPITAL ENCOUNTER (EMERGENCY)
Facility: HOSPITAL | Age: 43
Discharge: HOME | End: 2023-08-01
Attending: EMERGENCY MEDICINE | Admitting: EMERGENCY MEDICINE
Payer: MEDICAID

## 2023-08-01 VITALS
DIASTOLIC BLOOD PRESSURE: 92 MMHG | WEIGHT: 200 LBS | SYSTOLIC BLOOD PRESSURE: 125 MMHG | RESPIRATION RATE: 15 BRPM | OXYGEN SATURATION: 97 % | HEIGHT: 68 IN | TEMPERATURE: 98.7 F | BODY MASS INDEX: 30.31 KG/M2 | HEART RATE: 76 BPM

## 2023-08-01 DIAGNOSIS — G57.93 NEUROPATHIC PAIN OF BOTH FEET: Primary | ICD-10-CM

## 2023-08-01 DIAGNOSIS — Z86.79 HISTORY OF HYPERTENSION: ICD-10-CM

## 2023-08-01 DIAGNOSIS — Z86.39 HISTORY OF HYPERLIPIDEMIA: ICD-10-CM

## 2023-08-01 DIAGNOSIS — Z91.148 NONCOMPLIANCE WITH MEDICATION REGIMEN: ICD-10-CM

## 2023-08-01 DIAGNOSIS — Z86.39 HISTORY OF DIABETES MELLITUS: ICD-10-CM

## 2023-08-01 PROCEDURE — 99283 EMERGENCY DEPT VISIT LOW MDM: CPT

## 2023-08-01 RX ORDER — IBUPROFEN 400 MG/1
800 TABLET ORAL ONCE
Status: COMPLETED | OUTPATIENT
Start: 2023-08-01 | End: 2023-08-01

## 2023-08-01 RX ORDER — METFORMIN HYDROCHLORIDE 500 MG/1
500 TABLET, EXTENDED RELEASE ORAL
Qty: 30 TABLET | Refills: 0 | Status: SHIPPED | OUTPATIENT
Start: 2023-08-01

## 2023-08-01 RX ORDER — IBUPROFEN 600 MG/1
600 TABLET ORAL EVERY 8 HOURS PRN
Qty: 15 TABLET | Refills: 0 | Status: SHIPPED | OUTPATIENT
Start: 2023-08-01 | End: 2023-08-07

## 2023-08-01 RX ADMIN — IBUPROFEN 800 MG: 400 TABLET, FILM COATED ORAL at 20:06

## 2023-11-24 ENCOUNTER — HOSPITAL ENCOUNTER (INPATIENT)
Facility: HOSPITAL | Age: 43
LOS: 4 days | Discharge: HOME | End: 2023-11-29
Attending: EMERGENCY MEDICINE | Admitting: INTERNAL MEDICINE
Payer: MEDICAID

## 2023-11-24 ENCOUNTER — APPOINTMENT (OUTPATIENT)
Dept: CT IMAGING | Facility: HOSPITAL | Age: 43
End: 2023-11-24
Payer: MEDICAID

## 2023-11-24 DIAGNOSIS — K85.20 ALCOHOL-INDUCED ACUTE PANCREATITIS WITHOUT INFECTION OR NECROSIS: Primary | ICD-10-CM

## 2023-11-24 LAB
ALBUMIN SERPL-MCNC: 4.1 G/DL (ref 3.5–5.2)
ALBUMIN/GLOB SERPL: 1.2 G/DL
ALP SERPL-CCNC: 83 U/L (ref 39–117)
ALT SERPL W P-5'-P-CCNC: 14 U/L (ref 1–41)
ANION GAP SERPL CALCULATED.3IONS-SCNC: 10 MMOL/L (ref 5–15)
AST SERPL-CCNC: 34 U/L (ref 1–40)
BASOPHILS # BLD AUTO: 0.03 10*3/MM3 (ref 0–0.2)
BASOPHILS NFR BLD AUTO: 0.3 % (ref 0–1.5)
BILIRUB SERPL-MCNC: 0.7 MG/DL (ref 0–1.2)
BILIRUB UR QL STRIP: NEGATIVE
BUN SERPL-MCNC: 13 MG/DL (ref 6–20)
BUN/CREAT SERPL: 15.1 (ref 7–25)
CALCIUM SPEC-SCNC: 9.3 MG/DL (ref 8.6–10.5)
CHLORIDE SERPL-SCNC: 98 MMOL/L (ref 98–107)
CLARITY UR: CLEAR
CO2 SERPL-SCNC: 27 MMOL/L (ref 22–29)
COLOR UR: YELLOW
CREAT SERPL-MCNC: 0.86 MG/DL (ref 0.76–1.27)
DEPRECATED RDW RBC AUTO: 39.8 FL (ref 37–54)
EGFRCR SERPLBLD CKD-EPI 2021: 110.9 ML/MIN/1.73
EOSINOPHIL # BLD AUTO: 0.18 10*3/MM3 (ref 0–0.4)
EOSINOPHIL NFR BLD AUTO: 2 % (ref 0.3–6.2)
ERYTHROCYTE [DISTWIDTH] IN BLOOD BY AUTOMATED COUNT: 12.5 % (ref 12.3–15.4)
GLOBULIN UR ELPH-MCNC: 3.3 GM/DL
GLUCOSE SERPL-MCNC: 256 MG/DL (ref 65–99)
GLUCOSE UR STRIP-MCNC: ABNORMAL MG/DL
HCT VFR BLD AUTO: 48 % (ref 37.5–51)
HGB BLD-MCNC: 16.1 G/DL (ref 13–17.7)
HGB UR QL STRIP.AUTO: NEGATIVE
HOLD SPECIMEN: NORMAL
HOLD SPECIMEN: NORMAL
IMM GRANULOCYTES # BLD AUTO: 0.03 10*3/MM3 (ref 0–0.05)
IMM GRANULOCYTES NFR BLD AUTO: 0.3 % (ref 0–0.5)
KETONES UR QL STRIP: ABNORMAL
LEUKOCYTE ESTERASE UR QL STRIP.AUTO: NEGATIVE
LIPASE SERPL-CCNC: 287 U/L (ref 13–60)
LYMPHOCYTES # BLD AUTO: 3.06 10*3/MM3 (ref 0.7–3.1)
LYMPHOCYTES NFR BLD AUTO: 34.3 % (ref 19.6–45.3)
MCH RBC QN AUTO: 29.2 PG (ref 26.6–33)
MCHC RBC AUTO-ENTMCNC: 33.5 G/DL (ref 31.5–35.7)
MCV RBC AUTO: 87 FL (ref 79–97)
MONOCYTES # BLD AUTO: 0.77 10*3/MM3 (ref 0.1–0.9)
MONOCYTES NFR BLD AUTO: 8.6 % (ref 5–12)
NEUTROPHILS NFR BLD AUTO: 4.86 10*3/MM3 (ref 1.7–7)
NEUTROPHILS NFR BLD AUTO: 54.5 % (ref 42.7–76)
NITRITE UR QL STRIP: NEGATIVE
NRBC BLD AUTO-RTO: 0 /100 WBC (ref 0–0.2)
PH UR STRIP.AUTO: 7 [PH] (ref 5–8)
PLATELET # BLD AUTO: 270 10*3/MM3 (ref 140–450)
PMV BLD AUTO: 10.6 FL (ref 6–12)
POTASSIUM SERPL-SCNC: 4.1 MMOL/L (ref 3.5–5.2)
PROT SERPL-MCNC: 7.4 G/DL (ref 6–8.5)
PROT UR QL STRIP: NEGATIVE
RBC # BLD AUTO: 5.52 10*6/MM3 (ref 4.14–5.8)
SODIUM SERPL-SCNC: 135 MMOL/L (ref 136–145)
SP GR UR STRIP: 1.06 (ref 1–1.03)
TROPONIN T SERPL HS-MCNC: 10 NG/L
UROBILINOGEN UR QL STRIP: ABNORMAL
WBC NRBC COR # BLD AUTO: 8.93 10*3/MM3 (ref 3.4–10.8)
WHOLE BLOOD HOLD COAG: NORMAL
WHOLE BLOOD HOLD SPECIMEN: NORMAL

## 2023-11-24 PROCEDURE — 25010000002 ONDANSETRON PER 1 MG: Performed by: EMERGENCY MEDICINE

## 2023-11-24 PROCEDURE — 99285 EMERGENCY DEPT VISIT HI MDM: CPT

## 2023-11-24 PROCEDURE — 80053 COMPREHEN METABOLIC PANEL: CPT | Performed by: EMERGENCY MEDICINE

## 2023-11-24 PROCEDURE — 36415 COLL VENOUS BLD VENIPUNCTURE: CPT

## 2023-11-24 PROCEDURE — 25810000003 SODIUM CHLORIDE 0.9 % SOLUTION: Performed by: EMERGENCY MEDICINE

## 2023-11-24 PROCEDURE — 83690 ASSAY OF LIPASE: CPT | Performed by: EMERGENCY MEDICINE

## 2023-11-24 PROCEDURE — 74177 CT ABD & PELVIS W/CONTRAST: CPT

## 2023-11-24 PROCEDURE — 84484 ASSAY OF TROPONIN QUANT: CPT | Performed by: EMERGENCY MEDICINE

## 2023-11-24 PROCEDURE — 25510000001 IOPAMIDOL 61 % SOLUTION: Performed by: EMERGENCY MEDICINE

## 2023-11-24 PROCEDURE — 85025 COMPLETE CBC W/AUTO DIFF WBC: CPT | Performed by: EMERGENCY MEDICINE

## 2023-11-24 PROCEDURE — 81003 URINALYSIS AUTO W/O SCOPE: CPT | Performed by: EMERGENCY MEDICINE

## 2023-11-24 PROCEDURE — 93005 ELECTROCARDIOGRAM TRACING: CPT | Performed by: EMERGENCY MEDICINE

## 2023-11-24 PROCEDURE — 25010000002 MORPHINE PER 10 MG: Performed by: EMERGENCY MEDICINE

## 2023-11-24 RX ORDER — SODIUM CHLORIDE 0.9 % (FLUSH) 0.9 %
10 SYRINGE (ML) INJECTION AS NEEDED
Status: DISCONTINUED | OUTPATIENT
Start: 2023-11-24 | End: 2023-11-29 | Stop reason: HOSPADM

## 2023-11-24 RX ORDER — ONDANSETRON 2 MG/ML
4 INJECTION INTRAMUSCULAR; INTRAVENOUS ONCE
Status: COMPLETED | OUTPATIENT
Start: 2023-11-24 | End: 2023-11-24

## 2023-11-24 RX ORDER — MORPHINE SULFATE 4 MG/ML
4 INJECTION, SOLUTION INTRAMUSCULAR; INTRAVENOUS ONCE
Status: COMPLETED | OUTPATIENT
Start: 2023-11-24 | End: 2023-11-24

## 2023-11-24 RX ADMIN — IOPAMIDOL 84 ML: 612 INJECTION, SOLUTION INTRAVENOUS at 22:18

## 2023-11-24 RX ADMIN — ONDANSETRON 4 MG: 2 INJECTION INTRAMUSCULAR; INTRAVENOUS at 23:00

## 2023-11-24 RX ADMIN — MORPHINE SULFATE 4 MG: 4 INJECTION, SOLUTION INTRAMUSCULAR; INTRAVENOUS at 23:00

## 2023-11-24 RX ADMIN — SODIUM CHLORIDE 1000 ML: 9 INJECTION, SOLUTION INTRAVENOUS at 23:01

## 2023-11-24 NOTE — Clinical Note
Level of Care: Telemetry [5]   Diagnosis: Acute pancreatitis [577.0.ICD-9-CM]   Admitting Physician: TYRONE FRANCIS III [[id number]]   Attending Physician: TYRONE FRANCIS III [[id number]]   Bed Request Comments: tele obs (not CDU)

## 2023-11-25 PROBLEM — K85.90 ACUTE PANCREATITIS: Status: ACTIVE | Noted: 2023-11-25

## 2023-11-25 LAB
ARTICHOKE IGE QN: 74 MG/DL (ref 0–100)
CHOLEST SERPL-MCNC: 279 MG/DL (ref 0–200)
GLUCOSE BLDC GLUCOMTR-MCNC: 219 MG/DL (ref 70–130)
GLUCOSE BLDC GLUCOMTR-MCNC: 223 MG/DL (ref 70–130)
GLUCOSE BLDC GLUCOMTR-MCNC: 227 MG/DL (ref 70–130)
GLUCOSE BLDC GLUCOMTR-MCNC: 230 MG/DL (ref 70–130)
HDLC SERPL-MCNC: 22 MG/DL (ref 40–60)
HOLD SPECIMEN: NORMAL
LDLC SERPL CALC-MCNC: ABNORMAL MG/DL
LDLC/HDLC SERPL: ABNORMAL {RATIO}
LIPASE SERPL-CCNC: 244 U/L (ref 13–60)
QT INTERVAL: 374 MS
QTC INTERVAL: 423 MS
TRIGL SERPL-MCNC: 813 MG/DL (ref 0–150)
TRIGL SERPL-MCNC: 815 MG/DL (ref 0–150)
VLDLC SERPL-MCNC: ABNORMAL MG/DL

## 2023-11-25 PROCEDURE — 25810000003 SODIUM CHLORIDE 0.9 % SOLUTION: Performed by: INTERNAL MEDICINE

## 2023-11-25 PROCEDURE — 25010000002 HYDROMORPHONE PER 4 MG: Performed by: INTERNAL MEDICINE

## 2023-11-25 PROCEDURE — 82948 REAGENT STRIP/BLOOD GLUCOSE: CPT

## 2023-11-25 PROCEDURE — G0378 HOSPITAL OBSERVATION PER HR: HCPCS

## 2023-11-25 PROCEDURE — 99223 1ST HOSP IP/OBS HIGH 75: CPT | Performed by: INTERNAL MEDICINE

## 2023-11-25 PROCEDURE — 80061 LIPID PANEL: CPT | Performed by: INTERNAL MEDICINE

## 2023-11-25 PROCEDURE — 63710000001 INSULIN REGULAR HUMAN PER 5 UNITS: Performed by: INTERNAL MEDICINE

## 2023-11-25 PROCEDURE — 83721 ASSAY OF BLOOD LIPOPROTEIN: CPT | Performed by: INTERNAL MEDICINE

## 2023-11-25 PROCEDURE — 84478 ASSAY OF TRIGLYCERIDES: CPT | Performed by: INTERNAL MEDICINE

## 2023-11-25 PROCEDURE — 25010000002 HYDROMORPHONE 1 MG/ML SOLUTION: Performed by: EMERGENCY MEDICINE

## 2023-11-25 PROCEDURE — 25010000002 ONDANSETRON PER 1 MG: Performed by: EMERGENCY MEDICINE

## 2023-11-25 PROCEDURE — 83690 ASSAY OF LIPASE: CPT | Performed by: INTERNAL MEDICINE

## 2023-11-25 RX ORDER — SODIUM CHLORIDE 9 MG/ML
100 INJECTION, SOLUTION INTRAVENOUS CONTINUOUS
Status: DISCONTINUED | OUTPATIENT
Start: 2023-11-25 | End: 2023-11-29 | Stop reason: HOSPADM

## 2023-11-25 RX ORDER — FENOFIBRATE 48 MG/1
48 TABLET, COATED ORAL DAILY
Status: DISCONTINUED | OUTPATIENT
Start: 2023-11-25 | End: 2023-11-29 | Stop reason: HOSPADM

## 2023-11-25 RX ORDER — ATORVASTATIN CALCIUM 10 MG/1
10 TABLET, FILM COATED ORAL NIGHTLY
Status: DISCONTINUED | OUTPATIENT
Start: 2023-11-25 | End: 2023-11-29 | Stop reason: HOSPADM

## 2023-11-25 RX ORDER — HYDROMORPHONE HYDROCHLORIDE 1 MG/ML
0.5 INJECTION, SOLUTION INTRAMUSCULAR; INTRAVENOUS; SUBCUTANEOUS
Status: DISCONTINUED | OUTPATIENT
Start: 2023-11-25 | End: 2023-11-27 | Stop reason: ALTCHOICE

## 2023-11-25 RX ORDER — ONDANSETRON 2 MG/ML
4 INJECTION INTRAMUSCULAR; INTRAVENOUS ONCE
Status: COMPLETED | OUTPATIENT
Start: 2023-11-25 | End: 2023-11-25

## 2023-11-25 RX ORDER — NICOTINE 21 MG/24HR
1 PATCH, TRANSDERMAL 24 HOURS TRANSDERMAL
Status: DISCONTINUED | OUTPATIENT
Start: 2023-11-25 | End: 2023-11-29 | Stop reason: HOSPADM

## 2023-11-25 RX ORDER — NICOTINE POLACRILEX 4 MG
15 LOZENGE BUCCAL
Status: DISCONTINUED | OUTPATIENT
Start: 2023-11-25 | End: 2023-11-29 | Stop reason: HOSPADM

## 2023-11-25 RX ORDER — DEXTROSE MONOHYDRATE 25 G/50ML
25 INJECTION, SOLUTION INTRAVENOUS
Status: DISCONTINUED | OUTPATIENT
Start: 2023-11-25 | End: 2023-11-29 | Stop reason: HOSPADM

## 2023-11-25 RX ORDER — IBUPROFEN 600 MG/1
1 TABLET ORAL
Status: DISCONTINUED | OUTPATIENT
Start: 2023-11-25 | End: 2023-11-29 | Stop reason: HOSPADM

## 2023-11-25 RX ORDER — HYDROCODONE BITARTRATE AND ACETAMINOPHEN 5; 325 MG/1; MG/1
1 TABLET ORAL EVERY 6 HOURS PRN
Status: DISCONTINUED | OUTPATIENT
Start: 2023-11-25 | End: 2023-11-29 | Stop reason: HOSPADM

## 2023-11-25 RX ADMIN — INSULIN HUMAN 3 UNITS: 100 INJECTION, SOLUTION PARENTERAL at 08:33

## 2023-11-25 RX ADMIN — HYDROCODONE BITARTRATE AND ACETAMINOPHEN 1 TABLET: 5; 325 TABLET ORAL at 08:33

## 2023-11-25 RX ADMIN — HYDROMORPHONE HYDROCHLORIDE 0.5 MG: 1 INJECTION, SOLUTION INTRAMUSCULAR; INTRAVENOUS; SUBCUTANEOUS at 21:27

## 2023-11-25 RX ADMIN — ONDANSETRON 4 MG: 2 INJECTION INTRAMUSCULAR; INTRAVENOUS at 01:38

## 2023-11-25 RX ADMIN — Medication 1 PATCH: at 08:33

## 2023-11-25 RX ADMIN — SODIUM CHLORIDE 100 ML/HR: 9 INJECTION, SOLUTION INTRAVENOUS at 12:00

## 2023-11-25 RX ADMIN — ATORVASTATIN CALCIUM 10 MG: 40 TABLET, FILM COATED ORAL at 21:27

## 2023-11-25 RX ADMIN — INSULIN HUMAN 4 UNITS: 100 INJECTION, SOLUTION PARENTERAL at 21:26

## 2023-11-25 RX ADMIN — HYDROCODONE BITARTRATE AND ACETAMINOPHEN 1 TABLET: 5; 325 TABLET ORAL at 15:03

## 2023-11-25 RX ADMIN — HYDROMORPHONE HYDROCHLORIDE 0.5 MG: 1 INJECTION, SOLUTION INTRAMUSCULAR; INTRAVENOUS; SUBCUTANEOUS at 19:06

## 2023-11-25 RX ADMIN — INSULIN HUMAN 3 UNITS: 100 INJECTION, SOLUTION PARENTERAL at 17:14

## 2023-11-25 RX ADMIN — FENOFIBRATE 48 MG: 48 TABLET ORAL at 08:33

## 2023-11-25 RX ADMIN — INSULIN HUMAN 3 UNITS: 100 INJECTION, SOLUTION PARENTERAL at 12:00

## 2023-11-25 RX ADMIN — HYDROMORPHONE HYDROCHLORIDE 1 MG: 1 INJECTION, SOLUTION INTRAMUSCULAR; INTRAVENOUS; SUBCUTANEOUS at 01:34

## 2023-11-25 NOTE — PLAN OF CARE
Goal Outcome Evaluation:  Plan of Care Reviewed With: patient        Progress: improving  Outcome Evaluation: Pain will be controlled during hospitalization with prn medications.

## 2023-11-25 NOTE — PROGRESS NOTES
[facility] Medicine Services  ADMISSION FOLLOW-UP NOTE          Patient admitted after midnight, H&P by my partner performed earlier on today's date reviewed.  Interim findings, labs, and charting also reviewed.        [facility] Hospital Problem List has been managed and updated to include any new diagnoses:  Active Hospital Problems    Diagnosis  POA    **Acute pancreatitis [K85.90]  Yes      Resolved Hospital Problems   No resolved problems to display.     43-year-old male with history of type 2 diabetes hyperlipidemia, tobacco, alcohol and cocaine abuse who presented with abdominal pain admitted with acute pancreatitis.  This morning patient feels slightly better,    Acute pancreatitis  -Suspect EtOH related versus hypertriglyceridemia  -CT concerning for acute pancreatitis, lipase elevated  -Continue supportive therapy, IV fluids, antiemetics, pain control  -Can give a trial of CLD if he can tolerate it    Hyperlipidemia/hypertriglyceridemia  -Patient denies diagnosis, however has statin and Tricor on his home meds, will continue for the for now  -Triglycerides 815, follow-up lipid panel    Cocaine, tobacco, EtOH abuse  -Continue NRT  -Continue IV fluids  -Start CIWA protocol    Otherwise continue plan of care per admission H&P    Expected Discharge   Expected discharge date/ time has not been documented.     Paulina Holden MD  11/25/23

## 2023-11-25 NOTE — ED PROVIDER NOTES
Subjective  History of Present Illness:    Chief Complaint: Abdominal pain, history of pancreatitis  History of Present Illness: 43-year-old male with mid abdominal pain with a history of pancreatitis, he admits that he has a history of alcohol abuse, the last time he had pancreatitis was in March 2022, he has not been drinking, however with friends coming over for the holiday yesterday he did drink alcohol.  He now has mid abdominal pain, he is able to keep down liquids but not eat.  Significant past medical history for alcohol pancreatitis, hypertension, hyperlipidemia, type 2 diabetes.  Onset: Sudden onset  Duration: 1 day  Exacerbating / Alleviating factors: Pain is worse with eating and to touch  Associated symptoms: Nausea vomiting      Nurses Notes reviewed and agree, including vitals, allergies, social history and prior medical history.     REVIEW OF SYSTEMS: All systems reviewed and not pertinent unless noted.    Review of Systems   Gastrointestinal:  Positive for abdominal pain and nausea.   All other systems reviewed and are negative.      Past Medical History:   Diagnosis Date    Alcohol abuse     Diverticulitis     GERD (gastroesophageal reflux disease)     HLD (hyperlipidemia)     HTN (hypertension)     Pancreatitis     Pancreatitis     Polysubstance abuse     Type 2 diabetes mellitus        Allergies:    Patient has no known allergies.      No past surgical history on file.      Social History     Socioeconomic History    Marital status: Single   Tobacco Use    Smoking status: Every Day     Packs/day: 1.5     Types: Cigarettes     Start date: 1/1/1995    Smokeless tobacco: Never   Substance and Sexual Activity    Alcohol use: Yes     Alcohol/week: 24.0 standard drinks of alcohol     Types: 24 Cans of beer per week     Comment: 6 pack every other day    Drug use: Yes     Types: Marijuana, Cocaine(coke)     Comment: daily- Hasn't done cocaine since beginning of April 2017    Sexual activity: Defer  "        Family History   Problem Relation Age of Onset    Hypertension Father     Heart disease Father     Diabetes Father        Objective  Physical Exam:  /89   Pulse 80   Temp 98.7 °F (37.1 °C) (Oral)   Resp 18   Ht 172.7 cm (68\")   Wt 88.5 kg (195 lb)   SpO2 96%   BMI 29.65 kg/m²      Physical Exam  Vitals and nursing note reviewed.   Constitutional:       Appearance: He is well-developed.   HENT:      Head: Normocephalic and atraumatic.      Mouth/Throat:      Mouth: Mucous membranes are moist.   Eyes:      Extraocular Movements: Extraocular movements intact.   Cardiovascular:      Rate and Rhythm: Normal rate and regular rhythm.   Pulmonary:      Effort: Pulmonary effort is normal.      Breath sounds: Normal breath sounds.   Abdominal:      Palpations: Abdomen is soft.      Tenderness: There is abdominal tenderness in the epigastric area and left upper quadrant.   Musculoskeletal:         General: Normal range of motion.      Cervical back: Normal range of motion.   Skin:     General: Skin is warm and dry.   Neurological:      Mental Status: He is alert and oriented to person, place, and time.   Psychiatric:         Behavior: Behavior normal.         Thought Content: Thought content normal.         Judgment: Judgment normal.           Procedures    ED Course:    ED Course as of 11/25/23 0211   Fri Nov 24, 2023 2321 Reevaluate the patient, he is stable, will reassess after fluids and pain medicine to see if he is able to keep down liquids CT scan is consistent with pancreatitis [CS]      ED Course User Index  [CS] Donaldo Sarah Jr., DEVYN       Lab Results (last 24 hours)       Procedure Component Value Units Date/Time    CBC & Differential [[id number]]  (Normal) Collected: 11/24/23 2109    Specimen: Blood Updated: 11/24/23 2118    Narrative:      The following orders were created for panel order CBC & Differential.  Procedure                               Abnormality         Status             "         ---------                               -----------         ------                     CBC Auto Differential[[id number]]        Normal              Final result                 Please view results for these tests on the individual orders.    Comprehensive Metabolic Panel [[id number]]  (Abnormal) Collected: 11/24/23 2109    Specimen: Blood Updated: 11/24/23 2151     Glucose 256 mg/dL      BUN 13 mg/dL      Creatinine 0.86 mg/dL      Sodium 135 mmol/L      Potassium 4.1 mmol/L      Comment: Specimen hemolyzed.  Result may be falsely elevated.        Chloride 98 mmol/L      CO2 27.0 mmol/L      Calcium 9.3 mg/dL      Total Protein 7.4 g/dL      Albumin 4.1 g/dL      ALT (SGPT) 14 U/L      Comment: Specimen hemolyzed.  Result may  be falsely elevated.        AST (SGOT) 34 U/L      Alkaline Phosphatase 83 U/L      Total Bilirubin 0.7 mg/dL      Globulin 3.3 gm/dL      Comment: Calculated Result        A/G Ratio 1.2 g/dL      BUN/Creatinine Ratio 15.1     Anion Gap 10.0 mmol/L      eGFR 110.9 mL/min/1.73     Narrative:      GFR Normal >60  Chronic Kidney Disease <60  Kidney Failure <15      Lipase [[id number]]  (Abnormal) Collected: 11/24/23 2109    Specimen: Blood Updated: 11/24/23 2142     Lipase 287 U/L     Single High Sensitivity Troponin T [[id number]]  (Normal) Collected: 11/24/23 2109    Specimen: Blood Updated: 11/24/23 2139     HS Troponin T 10 ng/L     Narrative:      High Sensitive Troponin T Reference Range:  <14.0 ng/L- Negative Female for AMI  <22.0 ng/L- Negative Male for AMI  >=14 - Abnormal Female indicating possible myocardial injury.  >=22 - Abnormal Male indicating possible myocardial injury.   Clinicians would have to utilize clinical acumen, EKG, Troponin, and serial changes to determine if it is an Acute Myocardial Infarction or myocardial injury due to an underlying chronic condition.         CBC Auto Differential [[id number]]  (Normal) Collected: 11/24/23 2109    Specimen: Blood Updated:  11/24/23 2118     WBC 8.93 10*3/mm3      RBC 5.52 10*6/mm3      Hemoglobin 16.1 g/dL      Hematocrit 48.0 %      MCV 87.0 fL      MCH 29.2 pg      MCHC 33.5 g/dL      RDW 12.5 %      RDW-SD 39.8 fl      MPV 10.6 fL      Platelets 270 10*3/mm3      Neutrophil % 54.5 %      Lymphocyte % 34.3 %      Monocyte % 8.6 %      Eosinophil % 2.0 %      Basophil % 0.3 %      Immature Grans % 0.3 %      Neutrophils, Absolute 4.86 10*3/mm3      Lymphocytes, Absolute 3.06 10*3/mm3      Monocytes, Absolute 0.77 10*3/mm3      Eosinophils, Absolute 0.18 10*3/mm3      Basophils, Absolute 0.03 10*3/mm3      Immature Grans, Absolute 0.03 10*3/mm3      nRBC 0.0 /100 WBC     Urinalysis With Microscopic If Indicated (No Culture) - Urine, Clean Catch [[id number]]  (Abnormal) Collected: 11/24/23 2234    Specimen: Urine, Clean Catch Updated: 11/24/23 2258     Color, UA Yellow     Appearance, UA Clear     pH, UA 7.0     Specific Gravity, UA 1.063     Glucose, UA >=1000 mg/dL (3+)     Ketones, UA Trace     Bilirubin, UA Negative     Blood, UA Negative     Protein, UA Negative     Leuk Esterase, UA Negative     Nitrite, UA Negative     Urobilinogen, UA 1.0 E.U./dL    Narrative:      Urine microscopic not indicated.             CT Abdomen Pelvis With Contrast    Result Date: 11/24/2023  CT ABDOMEN PELVIS W CONTRAST Date of Exam: 11/24/2023 10:04 PM EST Indication: h/o pancreatitis. Comparison: 3/30/2022 Technique: Axial CT images were obtained of the abdomen and pelvis following the uneventful intravenous administration of 84 cc Isovue-300. Reconstructed coronal and sagittal images were also obtained. Automated exposure control and iterative construction methods were used. Findings: Visualized Chest:  The visualized lung bases and lower mediastinal structures are unremarkable. Liver: Liver is normal in size and CT density. No focal lesions. Gallbladder: The gallbladder is normal without evidence of radiopaque stones. Bile Ducts: No billiary dcutal  dilation. Spleen: Spleen is normal in size and CT density. Pancreas: Mild edema and fat stranding surrounding the pancreatic body and proximal tail. No pancreatic ductal dilation. Adrenals: Adrenal glands are unremarkable. Kidneys: Kidneys are normal in size. There are no stones or hydronephrosis. Gastrointestinal: No dilated loops of bowel to suggest bowel obstruction. Diverticulosis of the descending and sigmoid colon without evidence of acute diverticulitis. No acute inflammatory changes. Normal appendix Bladder: The bladder is normal. Pelvis:  No suspecious mass. Peritoneum/Mesentery: No fluid collection, ascities, or free air.   Lymph Nodes: No lymphadenopathy. Vasculature: Unremarkable Abdominal Wall: Unremarkable Bony Structures: No acute osseous abnormality     Impression: Impression: Findings consistent with acute pancreatitis without evidence of ductal dilation, necrosis or abscess formation. Colonic diverticulosis without evidence of acute diverticulitis. Electronically Signed: Chino Crews DO  11/24/2023 10:27 PM EST  Workstation ID: [id number]        Medical Decision Making  Patient Presentation 43-year-old male presenting with upper abdominal pain, history of abdominal pain, recent alcohol use, he was tender palpation in his left upper quadrant and epigastric region    DDX. Differential include angina, MI, pericarditis, esophagitis, peptic ulcer disease, pancreatic mass, pancreatitis, aortic dissection, mesenteric ischemia, nephrolithiasis, pyelonephritis,       Data Review/ Non ED Records /Analysis/Ordering unique tests. Review of previous visits, prior labs, prior imaging, available notes from prior evaluations or visits with specialists, medication list, allergies, past medical history, past surgical history CBC, CMP, lipase, urinalysis were performed        Independent Review Studies interpreted all labs including CBC, CMP, lipase, urinalysis discussed with the patient the  bedside    Intervention/Re-evaluation patient received IV fluid fluids, multiple IV pain medicine and antiemetics on reevaluation he continued to have pain and could not tolerate drinking.    Independent Clinician discussed the admitting physician Dr. Strickland excepted for admission    Risk Stratification tools/clinical decision rules patient presented with recurrent acute on chronic pancreatitis from alcohol abuse, he admittedly had not drank the night before, and began to have pain this morning.  He also has a history of hypertension, hyperlipidemia, hyper triglyceride, and diabetes, significant risk for pancreatic pseudocyst, abscess, liver disease, endorgan damage, based on the patient's symptoms and presentation he was found to be consistent with acute pancreatitis and admitted for further care    Shared Decision Making discussed the plan with the patient he agrees    Disposition patient admitted for acute pancreatitis    Problems Addressed:  Alcohol-induced acute pancreatitis without infection or necrosis: complicated acute illness or injury    Amount and/or Complexity of Data Reviewed  Labs: ordered.  Radiology: ordered.  ECG/medicine tests: ordered.    Risk  Prescription drug management.  Decision regarding hospitalization.          Final diagnoses:   Alcohol-induced acute pancreatitis without infection or necrosis          Donaldo Sarah Jr., PA-C  11/25/23 0144       Donaldo Sarah Jr., PA-C  11/25/23 0211

## 2023-11-25 NOTE — H&P
"    [facility] Medicine Services  HISTORY AND PHYSICAL    Patient Name: Yg Araujo  : 1980  MRN: [id number]  Primary Care Physician: Provider, No Known  Date of admission: 2023      Subjective   Subjective     Chief Complaint:  Abdominal pain    HPI:  Yg Araujo is a 43 y.o. male who states that last night () he drank alcohol, adding that \"I know I am not supposed to\" as he has had a previous bout of pancreatitis secondary to alcohol use.  He states that between he and 4 other people the drank a fifth of tequila, and he also did some intranasal cocaine.  He states he woke up today () and attempted to have a morning meal, but immediately noticed abdominal pain with nausea.  He locates the pain at the epigastrium, describes it as \"like I had been punched,\" denies any radiation, made worse with any oral intake of food or fluids, and he can cite no alleviating factors.  He attempted to drink water after arriving in the ED, but he states this immediately caused increased pain and nausea and he had a bout of emesis; no clif blood in any bouts of emesis today.  He denies chest pain, shortness of breath, fever/chills, bowel habit change, slurred speech/facial droop, or syncope.  He states his medical history significant for type 2 diabetes.  He does have a statin and anti-triglyceride agent on his home medication list as well.      Personal History     Past Medical History:   Diagnosis Date    Alcohol abuse     Diverticulitis     GERD (gastroesophageal reflux disease)     HLD (hyperlipidemia)     HTN (hypertension)     Pancreatitis     Pancreatitis     Polysubstance abuse     Type 2 diabetes mellitus              No past surgical history on file.    Family History: family history includes Diabetes in his father; Heart disease in his father; Hypertension in his father.     Social History:  reports that he has been smoking cigarettes. He " started smoking about 28 years ago. He has been smoking an average of 1.5 packs per day. He has never used smokeless tobacco. He reports current alcohol use of about 24.0 standard drinks of alcohol per week. He reports current drug use. Drugs: Marijuana and Cocaine(coke).  Social History     Social History Narrative    Not on file       Medications:  Available home medication information reviewed.  (Not in a hospital admission)      No Known Allergies    Objective   Objective     Vital Signs:   Temp:  [98.7 °F (37.1 °C)] 98.7 °F (37.1 °C)  Heart Rate:  [74-89] 80  Resp:  [18] 18  BP: (128-149)/() 144/89       Physical Exam   Constitutional: Awake, alert, NAD, pleasant.  Eyes: PERRLA, sclerae anicteric, no conjunctival injection  HENT: NCAT, mucous membranes moist  Neck: Supple, no thyromegaly, no lymphadenopathy, trachea midline  Respiratory: Clear to auscultation bilaterally, nonlabored respirations   Cardiovascular: RRR, no murmurs, rubs, or gallops, palpable pedal pulses bilaterally  Gastrointestinal: Positive but decreased bowel sounds, soft, TTP at the epigastrium but otherwise nontender, nondistended, no peritoneal signs  Musculoskeletal: No bilateral ankle edema, no clubbing or cyanosis to extremities  Psychiatric: Appropriate affect, cooperative  Neurologic: Oriented x 3, strength symmetric in all extremities, Cranial Nerves grossly intact to confrontation, speech clear  Skin: No rashes, normal turgor.    Result Review:  I have personally reviewed the results from the time of this admission to 11/25/2023 01:49 EST and agree with these findings:  [x]  Laboratory list / accordion  []  Microbiology  [x]  Radiology  [x]  EKG/Telemetry   []  Cardiology/Vascular   []  Pathology  []  Old records  []  Other:  Most notable findings include: I reviewed EKG which by my read shows normal sinus rhythm, ventricular rate approximately 75 bpm, normal axis, nonspecific ST/T wave changes but no acute appearing ST  elevation.        LAB RESULTS:      Lab 11/24/23 2109   WBC 8.93   HEMOGLOBIN 16.1   HEMATOCRIT 48.0   PLATELETS 270   NEUTROS ABS 4.86   IMMATURE GRANS (ABS) 0.03   LYMPHS ABS 3.06   MONOS ABS 0.77   EOS ABS 0.18   MCV 87.0         Lab 11/24/23 2109   SODIUM 135*   POTASSIUM 4.1   CHLORIDE 98   CO2 27.0   ANION GAP 10.0   BUN 13   CREATININE 0.86   EGFR 110.9   GLUCOSE 256*   CALCIUM 9.3         Lab 11/24/23 2109   TOTAL PROTEIN 7.4   ALBUMIN 4.1   GLOBULIN 3.3   ALT (SGPT) 14   AST (SGOT) 34   BILIRUBIN 0.7   ALK PHOS 83   LIPASE 287*         Lab 11/24/23 2109   HSTROP T 10                 UA          11/24/2023    22:34   Urinalysis   Specific Gravity, UA 1.063    Ketones, UA Trace    Blood, UA Negative    Leukocytes, UA Negative    Nitrite, UA Negative        Microbiology Results (last 10 days)       ** No results found for the last 240 hours. **            CT Abdomen Pelvis With Contrast    Result Date: 11/24/2023  CT ABDOMEN PELVIS W CONTRAST Date of Exam: 11/24/2023 10:04 PM EST Indication: h/o pancreatitis. Comparison: 3/30/2022 Technique: Axial CT images were obtained of the abdomen and pelvis following the uneventful intravenous administration of 84 cc Isovue-300. Reconstructed coronal and sagittal images were also obtained. Automated exposure control and iterative construction methods were used. Findings: Visualized Chest:  The visualized lung bases and lower mediastinal structures are unremarkable. Liver: Liver is normal in size and CT density. No focal lesions. Gallbladder: The gallbladder is normal without evidence of radiopaque stones. Bile Ducts: No billiary dcutal dilation. Spleen: Spleen is normal in size and CT density. Pancreas: Mild edema and fat stranding surrounding the pancreatic body and proximal tail. No pancreatic ductal dilation. Adrenals: Adrenal glands are unremarkable. Kidneys: Kidneys are normal in size. There are no stones or hydronephrosis. Gastrointestinal: No dilated loops of  bowel to suggest bowel obstruction. Diverticulosis of the descending and sigmoid colon without evidence of acute diverticulitis. No acute inflammatory changes. Normal appendix Bladder: The bladder is normal. Pelvis:  No suspecious mass. Peritoneum/Mesentery: No fluid collection, ascities, or free air.   Lymph Nodes: No lymphadenopathy. Vasculature: Unremarkable Abdominal Wall: Unremarkable Bony Structures: No acute osseous abnormality     Impression: Impression: Findings consistent with acute pancreatitis without evidence of ductal dilation, necrosis or abscess formation. Colonic diverticulosis without evidence of acute diverticulitis. Electronically Signed: Chino Crews DO  11/24/2023 10:27 PM EST  Workstation ID: [id number]         Assessment & Plan   Assessment & Plan     Active Hospital Problems    Diagnosis  POA    **Acute pancreatitis [K85.90]  Yes       43M with acute pancreatitis secondary to alcohol use    Acute pancreatitis  - NPO.  - Pain control with IV agents as needed.  - IVF with 0.9 NS.  - Check a.m. lipase.    Type 2 diabetes  - Hold home metformin while NPO.  - SSI with hypoglycemia coverage, fingersticks every 6 hours while NPO.    Hyperlipidemia/hypertriglyceridemia  - He denies this diagnosis, but has statin and Tricor listed on his home medication list, which will be continued.  - Check triglycerides.    Cocaine abuse  Tobacco abuse  Alcohol abuse  - Cessation counseled/advised.  - He requests transdermal nicotine.  - IVF with 0.9 NS.  - Vital signs currently stable; can add benzodiazepine therapy if needed.        Total time spent: 81 minutes  Time spent includes time reviewing chart, face-to-face time, counseling patient/family/caregiver, ordering medications/tests/procedures, communicating with other health care professionals, documenting clinical information in the electronic health record, and coordination of care.     DVT prophylaxis:  scds      CODE STATUS:  full  There are no  questions and answers to display.       Expected Discharge tbd    Param Strickland,III, DO  11/25/23

## 2023-11-26 LAB
GLUCOSE BLDC GLUCOMTR-MCNC: 196 MG/DL (ref 70–130)
GLUCOSE BLDC GLUCOMTR-MCNC: 234 MG/DL (ref 70–130)
GLUCOSE BLDC GLUCOMTR-MCNC: 238 MG/DL (ref 70–130)
GLUCOSE BLDC GLUCOMTR-MCNC: 259 MG/DL (ref 70–130)
GLUCOSE BLDC GLUCOMTR-MCNC: 265 MG/DL (ref 70–130)

## 2023-11-26 PROCEDURE — 99232 SBSQ HOSP IP/OBS MODERATE 35: CPT | Performed by: INTERNAL MEDICINE

## 2023-11-26 PROCEDURE — 25810000003 SODIUM CHLORIDE 0.9 % SOLUTION: Performed by: INTERNAL MEDICINE

## 2023-11-26 PROCEDURE — 63710000001 INSULIN REGULAR HUMAN PER 5 UNITS: Performed by: INTERNAL MEDICINE

## 2023-11-26 PROCEDURE — 82948 REAGENT STRIP/BLOOD GLUCOSE: CPT

## 2023-11-26 PROCEDURE — G0378 HOSPITAL OBSERVATION PER HR: HCPCS

## 2023-11-26 PROCEDURE — 63710000001 DIPHENHYDRAMINE PER 50 MG: Performed by: NURSE PRACTITIONER

## 2023-11-26 PROCEDURE — 25010000002 HYDROMORPHONE PER 4 MG: Performed by: INTERNAL MEDICINE

## 2023-11-26 RX ORDER — DIPHENHYDRAMINE HCL 25 MG
25 CAPSULE ORAL ONCE
Status: COMPLETED | OUTPATIENT
Start: 2023-11-26 | End: 2023-11-26

## 2023-11-26 RX ADMIN — ATORVASTATIN CALCIUM 10 MG: 40 TABLET, FILM COATED ORAL at 20:54

## 2023-11-26 RX ADMIN — DIPHENHYDRAMINE HYDROCHLORIDE 25 MG: 25 CAPSULE ORAL at 03:06

## 2023-11-26 RX ADMIN — INSULIN HUMAN 3 UNITS: 100 INJECTION, SOLUTION PARENTERAL at 11:11

## 2023-11-26 RX ADMIN — INSULIN HUMAN 4 UNITS: 100 INJECTION, SOLUTION PARENTERAL at 18:27

## 2023-11-26 RX ADMIN — INSULIN HUMAN 2 UNITS: 100 INJECTION, SOLUTION PARENTERAL at 08:01

## 2023-11-26 RX ADMIN — Medication 10 ML: at 08:02

## 2023-11-26 RX ADMIN — HYDROMORPHONE HYDROCHLORIDE 0.5 MG: 1 INJECTION, SOLUTION INTRAMUSCULAR; INTRAVENOUS; SUBCUTANEOUS at 16:12

## 2023-11-26 RX ADMIN — HYDROCODONE BITARTRATE AND ACETAMINOPHEN 1 TABLET: 5; 325 TABLET ORAL at 11:11

## 2023-11-26 RX ADMIN — HYDROMORPHONE HYDROCHLORIDE 0.5 MG: 1 INJECTION, SOLUTION INTRAMUSCULAR; INTRAVENOUS; SUBCUTANEOUS at 20:53

## 2023-11-26 RX ADMIN — SODIUM CHLORIDE 100 ML/HR: 9 INJECTION, SOLUTION INTRAVENOUS at 08:02

## 2023-11-26 RX ADMIN — INSULIN HUMAN 4 UNITS: 100 INJECTION, SOLUTION PARENTERAL at 20:53

## 2023-11-26 RX ADMIN — HYDROMORPHONE HYDROCHLORIDE 0.5 MG: 1 INJECTION, SOLUTION INTRAMUSCULAR; INTRAVENOUS; SUBCUTANEOUS at 00:54

## 2023-11-26 RX ADMIN — HYDROCODONE BITARTRATE AND ACETAMINOPHEN 1 TABLET: 5; 325 TABLET ORAL at 02:35

## 2023-11-26 RX ADMIN — HYDROCODONE BITARTRATE AND ACETAMINOPHEN 1 TABLET: 5; 325 TABLET ORAL at 18:30

## 2023-11-26 RX ADMIN — FENOFIBRATE 48 MG: 48 TABLET ORAL at 08:02

## 2023-11-26 RX ADMIN — HYDROMORPHONE HYDROCHLORIDE 0.5 MG: 1 INJECTION, SOLUTION INTRAMUSCULAR; INTRAVENOUS; SUBCUTANEOUS at 08:01

## 2023-11-26 NOTE — PROGRESS NOTES
[facility] Medicine Services  PROGRESS NOTE    Patient Name: Yg Araujo  : 1980  MRN: [id number]    Date of Admission: 2023  Primary Care Physician: Provider, No Known    Subjective   Subjective     CC: Follow-up acute pancreatitis    HPI: No acute events overnight, patient continues to complain of abdominal pain      Objective   Objective     Vital Signs:   Temp:  [96.8 °F (36 °C)-98.7 °F (37.1 °C)] 98.7 °F (37.1 °C)  Heart Rate:  [70-85] 74  Resp:  [17-20] 18  BP: (116-145)/() 118/87     Physical Exam:  Constitutional: No acute distress, awake, alert  Respiratory: Nonlabored respirations  Cardiovascular: RRR, no murmurs, rubs, or gallops  Gastrointestinal: Positive bowel sounds, soft, moderately tender to palpation in the epigastric area  Musculoskeletal: No bilateral ankle edema  Psychiatric: Appropriate affect, cooperative  Neurologic: Nonfocal      Results Reviewed:  LAB RESULTS:      Lab 23   WBC 8.93   HEMOGLOBIN 16.1   HEMATOCRIT 48.0   PLATELETS 270   NEUTROS ABS 4.86   IMMATURE GRANS (ABS) 0.03   LYMPHS ABS 3.06   MONOS ABS 0.77   EOS ABS 0.18   MCV 87.0         Lab 23   SODIUM 135*   POTASSIUM 4.1   CHLORIDE 98   CO2 27.0   ANION GAP 10.0   BUN 13   CREATININE 0.86   EGFR 110.9   GLUCOSE 256*   CALCIUM 9.3         Lab 23  0614 23   TOTAL PROTEIN  --  7.4   ALBUMIN  --  4.1   GLOBULIN  --  3.3   ALT (SGPT)  --  14   AST (SGOT)  --  34   BILIRUBIN  --  0.7   ALK PHOS  --  83   LIPASE 244* 287*         Lab 23   HSTROP T 10         Lab 23  0614   CHOLESTEROL 279*   LDL CHOL 74   HDL CHOL 22*   TRIGLYCERIDES 813*  815*             Brief Urine Lab Results  (Last result in the past 365 days)        Color   Clarity   Blood   Leuk Est   Nitrite   Protein   CREAT   Urine HCG        234 Yellow   Clear   Negative   Negative   Negative   Negative                   Microbiology Results Abnormal        None            CT Abdomen Pelvis With Contrast    Result Date: 11/24/2023  CT ABDOMEN PELVIS W CONTRAST Date of Exam: 11/24/2023 10:04 PM EST Indication: h/o pancreatitis. Comparison: 3/30/2022 Technique: Axial CT images were obtained of the abdomen and pelvis following the uneventful intravenous administration of 84 cc Isovue-300. Reconstructed coronal and sagittal images were also obtained. Automated exposure control and iterative construction methods were used. Findings: Visualized Chest:  The visualized lung bases and lower mediastinal structures are unremarkable. Liver: Liver is normal in size and CT density. No focal lesions. Gallbladder: The gallbladder is normal without evidence of radiopaque stones. Bile Ducts: No billiary dcutal dilation. Spleen: Spleen is normal in size and CT density. Pancreas: Mild edema and fat stranding surrounding the pancreatic body and proximal tail. No pancreatic ductal dilation. Adrenals: Adrenal glands are unremarkable. Kidneys: Kidneys are normal in size. There are no stones or hydronephrosis. Gastrointestinal: No dilated loops of bowel to suggest bowel obstruction. Diverticulosis of the descending and sigmoid colon without evidence of acute diverticulitis. No acute inflammatory changes. Normal appendix Bladder: The bladder is normal. Pelvis:  No suspecious mass. Peritoneum/Mesentery: No fluid collection, ascities, or free air.   Lymph Nodes: No lymphadenopathy. Vasculature: Unremarkable Abdominal Wall: Unremarkable Bony Structures: No acute osseous abnormality     Impression: Impression: Findings consistent with acute pancreatitis without evidence of ductal dilation, necrosis or abscess formation. Colonic diverticulosis without evidence of acute diverticulitis. Electronically Signed: Chino Crews DO  11/24/2023 10:27 PM EST  Workstation ID: [id number]         Current medications:  Scheduled Meds:atorvastatin, 10 mg, Oral, Nightly  fenofibrate, 48 mg, Oral,  Daily  insulin regular, 2-7 Units, Subcutaneous, Q6H  nicotine, 1 patch, Transdermal, Q24H      Continuous Infusions:sodium chloride, 100 mL/hr, Last Rate: 100 mL/hr (11/25/23 1200)      PRN Meds:.  dextrose    dextrose    glucagon (human recombinant)    HYDROcodone-acetaminophen    HYDROmorphone    influenza vaccine    sodium chloride    Assessment & Plan   Assessment & Plan     Active Hospital Problems    Diagnosis  POA    **Acute pancreatitis [K85.90]  Yes      Resolved Hospital Problems   No resolved problems to display.        Brief Hospital Course to date:  43-year-old male with history of type 2 diabetes hyperlipidemia, tobacco, alcohol and cocaine abuse who presented with abdominal pain admitted with acute pancreatitis.  This morning patient feels slightly better,     Acute pancreatitis, improving  -Suspect EtOH related versus hypertriglyceridemia  -CT concerning for acute pancreatitis, lipase elevated  -Continue supportive therapy, IV fluids, antiemetics, pain control  -Currently on CLD, though continues to have abdominal pain, advance as tolerated     Hyperlipidemia/hypertriglyceridemia  -Patient denies diagnosis, however has statin and Tricor on his home meds, will continue for the for now  -Triglycerides 815, LDL 74, HDL 22, total cholesterol 279     Cocaine, tobacco, EtOH abuse  -Continue NRT  -Continue IV fluids  - CIWA protocol    Expected Discharge Location and Transportation: home  Expected Discharge   Expected Discharge Date: 11/28/2023; Expected Discharge Time:      DVT prophylaxis:  No DVT prophylaxis order currently exists.     AM-PAC 6 Clicks Score (PT): 24 (11/25/23 1926)    CODE STATUS:   There are no questions and answers to display.       Paulina Holden MD  11/26/23

## 2023-11-27 LAB
ALBUMIN SERPL-MCNC: 3.6 G/DL (ref 3.5–5.2)
ALBUMIN/GLOB SERPL: 1.3 G/DL
ALP SERPL-CCNC: 61 U/L (ref 39–117)
ALT SERPL W P-5'-P-CCNC: 7 U/L (ref 1–41)
ANION GAP SERPL CALCULATED.3IONS-SCNC: 6 MMOL/L (ref 5–15)
AST SERPL-CCNC: 19 U/L (ref 1–40)
BILIRUB SERPL-MCNC: 0.7 MG/DL (ref 0–1.2)
BUN SERPL-MCNC: 9 MG/DL (ref 6–20)
BUN/CREAT SERPL: 10.7 (ref 7–25)
CALCIUM SPEC-SCNC: 8.6 MG/DL (ref 8.6–10.5)
CHLORIDE SERPL-SCNC: 103 MMOL/L (ref 98–107)
CO2 SERPL-SCNC: 29 MMOL/L (ref 22–29)
CREAT SERPL-MCNC: 0.84 MG/DL (ref 0.76–1.27)
EGFRCR SERPLBLD CKD-EPI 2021: 111.7 ML/MIN/1.73
GLOBULIN UR ELPH-MCNC: 2.8 GM/DL
GLUCOSE BLDC GLUCOMTR-MCNC: 217 MG/DL (ref 70–130)
GLUCOSE BLDC GLUCOMTR-MCNC: 220 MG/DL (ref 70–130)
GLUCOSE BLDC GLUCOMTR-MCNC: 235 MG/DL (ref 70–130)
GLUCOSE SERPL-MCNC: 163 MG/DL (ref 65–99)
POTASSIUM SERPL-SCNC: 4.5 MMOL/L (ref 3.5–5.2)
PROT SERPL-MCNC: 6.4 G/DL (ref 6–8.5)
SODIUM SERPL-SCNC: 138 MMOL/L (ref 136–145)
TRIGL SERPL-MCNC: 404 MG/DL (ref 0–150)

## 2023-11-27 PROCEDURE — 84478 ASSAY OF TRIGLYCERIDES: CPT | Performed by: INTERNAL MEDICINE

## 2023-11-27 PROCEDURE — 63710000001 INSULIN LISPRO (HUMAN) PER 5 UNITS: Performed by: INTERNAL MEDICINE

## 2023-11-27 PROCEDURE — 80053 COMPREHEN METABOLIC PANEL: CPT | Performed by: INTERNAL MEDICINE

## 2023-11-27 PROCEDURE — 82948 REAGENT STRIP/BLOOD GLUCOSE: CPT

## 2023-11-27 PROCEDURE — 25010000002 HYDROMORPHONE PER 4 MG: Performed by: INTERNAL MEDICINE

## 2023-11-27 PROCEDURE — 63710000001 INSULIN REGULAR HUMAN PER 5 UNITS: Performed by: INTERNAL MEDICINE

## 2023-11-27 PROCEDURE — 25810000003 SODIUM CHLORIDE 0.9 % SOLUTION: Performed by: INTERNAL MEDICINE

## 2023-11-27 PROCEDURE — 99232 SBSQ HOSP IP/OBS MODERATE 35: CPT | Performed by: INTERNAL MEDICINE

## 2023-11-27 RX ORDER — AMOXICILLIN 250 MG
2 CAPSULE ORAL 2 TIMES DAILY
Status: DISCONTINUED | OUTPATIENT
Start: 2023-11-27 | End: 2023-11-29 | Stop reason: HOSPADM

## 2023-11-27 RX ORDER — POLYETHYLENE GLYCOL 3350 17 G/17G
17 POWDER, FOR SOLUTION ORAL DAILY PRN
Status: DISCONTINUED | OUTPATIENT
Start: 2023-11-27 | End: 2023-11-29 | Stop reason: HOSPADM

## 2023-11-27 RX ORDER — INSULIN LISPRO 100 [IU]/ML
2-7 INJECTION, SOLUTION INTRAVENOUS; SUBCUTANEOUS
Status: DISCONTINUED | OUTPATIENT
Start: 2023-11-27 | End: 2023-11-29 | Stop reason: HOSPADM

## 2023-11-27 RX ORDER — BISACODYL 10 MG
10 SUPPOSITORY, RECTAL RECTAL DAILY PRN
Status: DISCONTINUED | OUTPATIENT
Start: 2023-11-27 | End: 2023-11-29 | Stop reason: HOSPADM

## 2023-11-27 RX ORDER — BISACODYL 5 MG/1
5 TABLET, DELAYED RELEASE ORAL DAILY PRN
Status: DISCONTINUED | OUTPATIENT
Start: 2023-11-27 | End: 2023-11-29 | Stop reason: HOSPADM

## 2023-11-27 RX ADMIN — INSULIN LISPRO 3 UNITS: 100 INJECTION, SOLUTION INTRAVENOUS; SUBCUTANEOUS at 13:56

## 2023-11-27 RX ADMIN — HYDROMORPHONE HYDROCHLORIDE 0.5 MG: 1 INJECTION, SOLUTION INTRAMUSCULAR; INTRAVENOUS; SUBCUTANEOUS at 07:03

## 2023-11-27 RX ADMIN — SENNOSIDES AND DOCUSATE SODIUM 2 TABLET: 8.6; 5 TABLET ORAL at 20:00

## 2023-11-27 RX ADMIN — HYDROMORPHONE HYDROCHLORIDE 0.5 MG: 1 INJECTION, SOLUTION INTRAMUSCULAR; INTRAVENOUS; SUBCUTANEOUS at 00:48

## 2023-11-27 RX ADMIN — HYDROCODONE BITARTRATE AND ACETAMINOPHEN 1 TABLET: 5; 325 TABLET ORAL at 00:48

## 2023-11-27 RX ADMIN — SENNOSIDES AND DOCUSATE SODIUM 2 TABLET: 8.6; 5 TABLET ORAL at 13:56

## 2023-11-27 RX ADMIN — SODIUM CHLORIDE 100 ML/HR: 9 INJECTION, SOLUTION INTRAVENOUS at 23:40

## 2023-11-27 RX ADMIN — HYDROCODONE BITARTRATE AND ACETAMINOPHEN 1 TABLET: 5; 325 TABLET ORAL at 13:56

## 2023-11-27 RX ADMIN — INSULIN HUMAN 3 UNITS: 100 INJECTION, SOLUTION PARENTERAL at 08:00

## 2023-11-27 RX ADMIN — FENOFIBRATE 48 MG: 48 TABLET ORAL at 08:00

## 2023-11-27 RX ADMIN — INSULIN LISPRO 3 UNITS: 100 INJECTION, SOLUTION INTRAVENOUS; SUBCUTANEOUS at 22:19

## 2023-11-27 RX ADMIN — SODIUM CHLORIDE 100 ML/HR: 9 INJECTION, SOLUTION INTRAVENOUS at 13:56

## 2023-11-27 RX ADMIN — ATORVASTATIN CALCIUM 10 MG: 40 TABLET, FILM COATED ORAL at 20:00

## 2023-11-27 RX ADMIN — HYDROCODONE BITARTRATE AND ACETAMINOPHEN 1 TABLET: 5; 325 TABLET ORAL at 07:03

## 2023-11-27 RX ADMIN — HYDROCODONE BITARTRATE AND ACETAMINOPHEN 1 TABLET: 5; 325 TABLET ORAL at 19:58

## 2023-11-27 NOTE — PROGRESS NOTES
Nutrition Services    Patient Name:  Yg Araujo  YOB: 1980  MRN: [id number]  Admit Date:  11/24/2023    Pt identified NPO/Clear Liquid Diet >72 hrs. Advance diet as medically appropriate. RD to monitor for diet advancement. Please consult RD if nutrition support indicated.      Electronically signed by:  Catehrine oCol MS,MARIA LUZ,LD  11/27/23 07:01 EST

## 2023-11-27 NOTE — PROGRESS NOTES
[facility] Medicine Services  PROGRESS NOTE    Patient Name: Yg Araujo  : 1980  MRN: [id number]    Date of Admission: 2023  Primary Care Physician: Provider, No Known    Subjective   Subjective     CC: Follow-up acute pancreatitis    HPI: No acute events overnight, patient continues to have some pain but is improved, tolerating CLD      Objective   Objective     Vital Signs:   Temp:  [97.6 °F (36.4 °C)-98.6 °F (37 °C)] 97.6 °F (36.4 °C)  Heart Rate:  [62-87] 66  Resp:  [18-20] 20  BP: (125-139)/(87-96) 139/87     Physical Exam:  Constitutional: No acute distress, awake, alert  HENT: NCAT, mucous membranes moist  Respiratory: Clear to auscultation bilaterally, respiratory effort normal   Cardiovascular: RRR, no murmurs, rubs, or gallops  Gastrointestinal: Positive bowel sounds, soft, moderately distended  Musculoskeletal: No bilateral ankle edema  Psychiatric: Appropriate affect, cooperative  Neurologic: Nonfocal  Skin: No rashes      Results Reviewed:  LAB RESULTS:      Lab 23   WBC 8.93   HEMOGLOBIN 16.1   HEMATOCRIT 48.0   PLATELETS 270   NEUTROS ABS 4.86   IMMATURE GRANS (ABS) 0.03   LYMPHS ABS 3.06   MONOS ABS 0.77   EOS ABS 0.18   MCV 87.0         Lab 23   SODIUM 138 135*   POTASSIUM 4.5 4.1   CHLORIDE 103 98   CO2 29.0 27.0   ANION GAP 6.0 10.0   BUN 9 13   CREATININE 0.84 0.86   EGFR 111.7 110.9   GLUCOSE 163* 256*   CALCIUM 8.6 9.3         Lab 2314 23   TOTAL PROTEIN 6.4  --  7.4   ALBUMIN 3.6  --  4.1   GLOBULIN 2.8  --  3.3   ALT (SGPT) 7  --  14   AST (SGOT) 19  --  34   BILIRUBIN 0.7  --  0.7   ALK PHOS 61  --  83   LIPASE  --  244* 287*         Lab 23   HSTROP T 10         Lab 23   CHOLESTEROL  --  279*   LDL CHOL  --  74   HDL CHOL  --  22*   TRIGLYCERIDES 404* 813*  815*             Brief Urine Lab Results  (Last result in the past 365 days)         Color   Clarity   Blood   Leuk Est   Nitrite   Protein   CREAT   Urine HCG        11/24/23 2234 Yellow   Clear   Negative   Negative   Negative   Negative                   Microbiology Results Abnormal       None            No radiology results from the last 24 hrs        Current medications:  Scheduled Meds:atorvastatin, 10 mg, Oral, Nightly  fenofibrate, 48 mg, Oral, Daily  insulin regular, 2-7 Units, Subcutaneous, Q6H  nicotine, 1 patch, Transdermal, Q24H      Continuous Infusions:sodium chloride, 100 mL/hr, Last Rate: 100 mL/hr (11/26/23 1721)      PRN Meds:.  dextrose    dextrose    glucagon (human recombinant)    HYDROcodone-acetaminophen    HYDROmorphone    influenza vaccine    sodium chloride    Assessment & Plan   Assessment & Plan     Active Hospital Problems    Diagnosis  POA    **Acute pancreatitis [K85.90]  Yes      Resolved Hospital Problems   No resolved problems to display.        Brief Hospital Course to date:  Yg Araujo is a 43 y.o. male with history of type 2 diabetes hyperlipidemia, tobacco, alcohol and cocaine abuse who presented with abdominal pain admitted with acute pancreatitis.       Acute pancreatitis, improving  -Suspect EtOH related versus hypertriglyceridemia  -CT concerning for acute pancreatitis, lipase elevated  -Continue supportive therapy, IV fluids, antiemetics, pain control  -Currently tolerating CLD, will advance to regular diet   -Will start bowel regimen    Hyperlipidemia/hypertriglyceridemia  -Patient denies diagnosis, however has statin and Tricor on his home meds, will continue for the for now  -Triglycerides 815, LDL 74, HDL 22, total cholesterol 279  -Triglycerides improving     Previously well-controlled type 2 diabetes A1c 7.7% (3/30/2022)  -F/u repeat A1c  -FSBG's reviewed and suboptimal  -Continue SSI for now    Cocaine, tobacco, EtOH abuse  -Continue NRT  -Continue IV fluids  - CIWA protocol     Expected Discharge Location and Transportation:  Home  Expected Discharge   Expected Discharge Date: 11/28/2023; Expected Discharge Time:      DVT prophylaxis:  No DVT prophylaxis order currently exists.     AM-PAC 6 Clicks Score (PT): 24 (11/26/23 1914)    CODE STATUS:   There are no questions and answers to display.       Paulina Holden MD  11/27/23

## 2023-11-28 ENCOUNTER — APPOINTMENT (OUTPATIENT)
Dept: GENERAL RADIOLOGY | Facility: HOSPITAL | Age: 43
End: 2023-11-28
Payer: MEDICAID

## 2023-11-28 LAB
GLUCOSE BLDC GLUCOMTR-MCNC: 202 MG/DL (ref 70–130)
GLUCOSE BLDC GLUCOMTR-MCNC: 203 MG/DL (ref 70–130)
GLUCOSE BLDC GLUCOMTR-MCNC: 236 MG/DL (ref 70–130)
GLUCOSE BLDC GLUCOMTR-MCNC: 243 MG/DL (ref 70–130)
HBA1C MFR BLD: 9.8 % (ref 4.8–5.6)
LIPASE SERPL-CCNC: 176 U/L (ref 13–60)

## 2023-11-28 PROCEDURE — 63710000001 INSULIN LISPRO (HUMAN) PER 5 UNITS: Performed by: INTERNAL MEDICINE

## 2023-11-28 PROCEDURE — 25810000003 SODIUM CHLORIDE 0.9 % SOLUTION: Performed by: INTERNAL MEDICINE

## 2023-11-28 PROCEDURE — 83036 HEMOGLOBIN GLYCOSYLATED A1C: CPT | Performed by: INTERNAL MEDICINE

## 2023-11-28 PROCEDURE — 99232 SBSQ HOSP IP/OBS MODERATE 35: CPT | Performed by: INTERNAL MEDICINE

## 2023-11-28 PROCEDURE — 83690 ASSAY OF LIPASE: CPT | Performed by: INTERNAL MEDICINE

## 2023-11-28 PROCEDURE — 82948 REAGENT STRIP/BLOOD GLUCOSE: CPT

## 2023-11-28 PROCEDURE — 74018 RADEX ABDOMEN 1 VIEW: CPT

## 2023-11-28 RX ADMIN — INSULIN LISPRO 3 UNITS: 100 INJECTION, SOLUTION INTRAVENOUS; SUBCUTANEOUS at 17:18

## 2023-11-28 RX ADMIN — HYDROCODONE BITARTRATE AND ACETAMINOPHEN 1 TABLET: 5; 325 TABLET ORAL at 01:15

## 2023-11-28 RX ADMIN — INSULIN LISPRO 3 UNITS: 100 INJECTION, SOLUTION INTRAVENOUS; SUBCUTANEOUS at 12:23

## 2023-11-28 RX ADMIN — POLYETHYLENE GLYCOL 3350 17 G: 17 POWDER, FOR SOLUTION ORAL at 08:50

## 2023-11-28 RX ADMIN — HYDROCODONE BITARTRATE AND ACETAMINOPHEN 1 TABLET: 5; 325 TABLET ORAL at 22:50

## 2023-11-28 RX ADMIN — MAGNESIUM HYDROXIDE 30 ML: 400 SUSPENSION ORAL at 13:14

## 2023-11-28 RX ADMIN — INSULIN LISPRO 3 UNITS: 100 INJECTION, SOLUTION INTRAVENOUS; SUBCUTANEOUS at 08:51

## 2023-11-28 RX ADMIN — SENNOSIDES AND DOCUSATE SODIUM 2 TABLET: 8.6; 5 TABLET ORAL at 08:51

## 2023-11-28 RX ADMIN — HYDROCODONE BITARTRATE AND ACETAMINOPHEN 1 TABLET: 5; 325 TABLET ORAL at 08:51

## 2023-11-28 RX ADMIN — Medication 10 ML: at 08:52

## 2023-11-28 RX ADMIN — HYDROCODONE BITARTRATE AND ACETAMINOPHEN 1 TABLET: 5; 325 TABLET ORAL at 16:27

## 2023-11-28 RX ADMIN — SENNOSIDES AND DOCUSATE SODIUM 2 TABLET: 8.6; 5 TABLET ORAL at 20:58

## 2023-11-28 RX ADMIN — ATORVASTATIN CALCIUM 10 MG: 40 TABLET, FILM COATED ORAL at 20:58

## 2023-11-28 RX ADMIN — FENOFIBRATE 48 MG: 48 TABLET ORAL at 08:51

## 2023-11-28 RX ADMIN — SODIUM CHLORIDE 100 ML/HR: 9 INJECTION, SOLUTION INTRAVENOUS at 08:59

## 2023-11-28 RX ADMIN — INSULIN LISPRO 3 UNITS: 100 INJECTION, SOLUTION INTRAVENOUS; SUBCUTANEOUS at 20:57

## 2023-11-28 NOTE — CASE MANAGEMENT/SOCIAL WORK
Discharge Planning Assessment  [facility]     Patient Name: Yg Araujo  MRN: [id number]  Today's Date: 11/28/2023    Admit Date: 11/24/2023    Plan: Home   Discharge Needs Assessment       Row Name 11/28/23 1134       Living Environment    People in Home alone    Current Living Arrangements home    Potentially Unsafe Housing Conditions none    Primary Care Provided by self    Provides Primary Care For no one    Family Caregiver if Needed parent(s)  his mother    Quality of Family Relationships helpful    Able to Return to Prior Arrangements yes       Resource/Environmental Concerns    Resource/Environmental Concerns none       Transition Planning    Patient/Family Anticipates Transition to home    Patient/Family Anticipated Services at Transition none    Transportation Anticipated family or friend will provide  his mother       Discharge Needs Assessment    Readmission Within the Last 30 Days no previous admission in last 30 days    Equipment Currently Used at Home none    Concerns to be Addressed no discharge needs identified    Anticipated Changes Related to Illness none    Equipment Needed After Discharge none    Current Discharge Risk lives alone                   Discharge Plan       Row Name 11/28/23 1136       Plan    Plan Home    Plan Comments I spoke with patient in regards to discharge planning. SDOH completed. Plans home at discharge. His mother provides his transportation    Final Discharge Disposition Code 01 - home or self-care                  Continued Care and Services - Admitted Since 11/24/2023    Coordination has not been started for this encounter.       Expected Discharge Date and Time       Expected Discharge Date Expected Discharge Time    Nov 28, 2023            Demographic Summary       Row Name 11/28/23 1134       General Information    Admission Type inpatient    Referral Source admission list    Reason for Consult discharge planning    Preferred Language English                    Functional Status       Row Name 11/28/23 1134       Functional Status    Usual Activity Tolerance excellent    Current Activity Tolerance excellent       Functional Status, IADL    Medications independent    Meal Preparation independent    Housekeeping independent    Laundry independent       Employment/    Employment/ Comments Patient has insurance and denies concerns regarding coverage or disruption in coverage issues. Patient has drug coverage and denies issues obtaining or affording current medications. .                   Psychosocial    No documentation.                  Abuse/Neglect    No documentation.                  Legal    No documentation.                  Substance Abuse    No documentation.                  Patient Forms    No documentation.                     Nicole Styles RN

## 2023-11-28 NOTE — DISCHARGE SUMMARY
[facility] Medicine Services  DISCHARGE SUMMARY    Patient Name: Yg Araujo  : 1980  MRN: [id number]    Date of Admission: 2023 10:27 PM  Date of Discharge: 2023  Primary Care Physician: Provider, No Known    Consults       No orders found from 10/26/2023 to 2023.            Hospital Course     Active Hospital Problems    Diagnosis  POA    **Acute pancreatitis [K85.90]  Yes      Resolved Hospital Problems   No resolved problems to display.      Hospital Course:  Yg Araujo is a 43 y.o. male with history of type 2 diabetes hyperlipidemia, tobacco, alcohol and cocaine abuse who presented with abdominal pain admitted with acute pancreatitis.       Acute pancreatitis, resolved  -Suspect EtOH related versus hypertriglyceridemia  -CT concerning for acute pancreatitis, lipase elevated  -He is s/p supportive therapy with continue supportive therapy, IV fluids, antiemetics, pain control, tolerated regular diet, has since had a bowel movement     Hyperlipidemia/hypertriglyceridemia  -Triglycerides 815, LDL 74, HDL 22, total cholesterol 279  -Patient denies diagnosis, however has statin and Tricor on his home meds, continue this     Well-controlled type 2 diabetes A1c 9.8%   -Continue home regimen     Cocaine, tobacco, EtOH abuse  -s/p Continue NRT  -Continue IV fluids  -Not on CIWA    Discharge Follow Up Recommendations for outpatient labs/diagnostics:  Follow-up with PCP in 1 week    Day of Discharge     HPI: No acute events overnight, patient feels much better this morning, ready to go home.    Review of Systems  Gen- No fevers, chills  CV- No chest pain, palpitations  Resp- No cough, dyspnea  GI- No N/V/D, abd pain      Vital Signs:   Temp:  [98.1 °F (36.7 °C)-98.7 °F (37.1 °C)] 98.2 °F (36.8 °C)  Heart Rate:  [60-75] 63  Resp:  [17-20] 18  BP: (119-133)/() 133/98      Physical Exam:  Constitutional: No acute distress, awake, alert  HENT: NCAT, mucous  membranes moist  Respiratory: Clear to auscultation bilaterally, respiratory effort normal   Cardiovascular: RRR, no murmurs, rubs, or gallops  Gastrointestinal: Positive bowel sounds, soft, nontender, nondistended  Musculoskeletal: No bilateral ankle edema  Psychiatric: Appropriate affect, cooperative  Neurologic: Oriented x 3, nonfocal  Skin: No rashes      Pertinent  and/or Most Recent Results     LAB RESULTS:      Lab 11/24/23 2109   WBC 8.93   HEMOGLOBIN 16.1   HEMATOCRIT 48.0   PLATELETS 270   NEUTROS ABS 4.86   IMMATURE GRANS (ABS) 0.03   LYMPHS ABS 3.06   MONOS ABS 0.77   EOS ABS 0.18   MCV 87.0         Lab 11/28/23  0940 11/27/23 0413 11/24/23 2109   SODIUM  --  138 135*   POTASSIUM  --  4.5 4.1   CHLORIDE  --  103 98   CO2  --  29.0 27.0   ANION GAP  --  6.0 10.0   BUN  --  9 13   CREATININE  --  0.84 0.86   EGFR  --  111.7 110.9   GLUCOSE  --  163* 256*   CALCIUM  --  8.6 9.3   HEMOGLOBIN A1C 9.80*  --   --          Lab 11/28/23  0940 11/27/23  0413 11/25/23  0614 11/24/23 2109   TOTAL PROTEIN  --  6.4  --  7.4   ALBUMIN  --  3.6  --  4.1   GLOBULIN  --  2.8  --  3.3   ALT (SGPT)  --  7  --  14   AST (SGOT)  --  19  --  34   BILIRUBIN  --  0.7  --  0.7   ALK PHOS  --  61  --  83   LIPASE 176*  --  244* 287*         Lab 11/24/23 2109   HSTROP T 10         Lab 11/27/23 0413 11/25/23  0614   CHOLESTEROL  --  279*   LDL CHOL  --  74   HDL CHOL  --  22*   TRIGLYCERIDES 404* 813*  815*             Brief Urine Lab Results  (Last result in the past 365 days)        Color   Clarity   Blood   Leuk Est   Nitrite   Protein   CREAT   Urine HCG        11/24/23 2234 Yellow   Clear   Negative   Negative   Negative   Negative                 Microbiology Results (last 10 days)       ** No results found for the last 240 hours. **            XR Abdomen KUB    Result Date: 11/28/2023  XR ABDOMEN KUB Date of Exam: 11/28/2023 10:14 AM EST Indication: Abdominal pain, distention Comparison: None available. Findings:  Single supine view. There is no large or small bowel dilatation. There are no abnormal calcifications. Bones appear normal.     Impression: No significant findings. Electronically Signed: Sharon Nguyen MD  11/28/2023 10:38 AM EST  Workstation ID: [id number]    CT Abdomen Pelvis With Contrast    Result Date: 11/24/2023  CT ABDOMEN PELVIS W CONTRAST Date of Exam: 11/24/2023 10:04 PM EST Indication: h/o pancreatitis. Comparison: 3/30/2022 Technique: Axial CT images were obtained of the abdomen and pelvis following the uneventful intravenous administration of 84 cc Isovue-300. Reconstructed coronal and sagittal images were also obtained. Automated exposure control and iterative construction methods were used. Findings: Visualized Chest:  The visualized lung bases and lower mediastinal structures are unremarkable. Liver: Liver is normal in size and CT density. No focal lesions. Gallbladder: The gallbladder is normal without evidence of radiopaque stones. Bile Ducts: No billiary dcutal dilation. Spleen: Spleen is normal in size and CT density. Pancreas: Mild edema and fat stranding surrounding the pancreatic body and proximal tail. No pancreatic ductal dilation. Adrenals: Adrenal glands are unremarkable. Kidneys: Kidneys are normal in size. There are no stones or hydronephrosis. Gastrointestinal: No dilated loops of bowel to suggest bowel obstruction. Diverticulosis of the descending and sigmoid colon without evidence of acute diverticulitis. No acute inflammatory changes. Normal appendix Bladder: The bladder is normal. Pelvis:  No suspecious mass. Peritoneum/Mesentery: No fluid collection, ascities, or free air.   Lymph Nodes: No lymphadenopathy. Vasculature: Unremarkable Abdominal Wall: Unremarkable Bony Structures: No acute osseous abnormality     Impression: Findings consistent with acute pancreatitis without evidence of ductal dilation, necrosis or abscess formation. Colonic diverticulosis without evidence of acute  diverticulitis. Electronically Signed: Chino Crews DO  11/24/2023 10:27 PM EST  Workstation ID: [id number]             Plan for Follow-up of Pending Labs/Results:     Discharge Details        Discharge Medications        New Medications        Instructions Start Date   HYDROcodone-acetaminophen 5-325 MG per tablet  Commonly known as: NORCO   1 tablet, Oral, Every 8 Hours PRN      influenza vac split quad 0.5 ML suspension prefilled syringe injection  Commonly known as: FLUZONE,FLUARIX,AFLURIA,FLULAVAL   0.5 mL, Intramuscular, Once             Continue These Medications        Instructions Start Date   atorvastatin 10 MG tablet  Commonly known as: LIPITOR   10 mg, Oral, Nightly      fenofibrate 48 MG tablet  Commonly known as: TRICOR   48 mg, Oral, Daily      metFORMIN  MG 24 hr tablet  Commonly known as: GLUCOPHAGE-XR   500 mg, Oral, Daily With Breakfast      Nicotine Step 1 21 MG/24HR patch  Generic drug: nicotine   1 patch, Transdermal, Every 24 Hours Scheduled      ondansetron 4 MG tablet  Commonly known as: Zofran   4 mg, Oral, Every 6 Hours PRN      pantoprazole 40 MG EC tablet  Commonly known as: PROTONIX   40 mg, Oral, 2 Times Daily Before Meals      traMADol 50 MG tablet  Commonly known as: ULTRAM   50 mg, Oral, Every 6 Hours PRN             Stop These Medications      methylPREDNISolone 4 MG dose pack  Commonly known as: MEDROL     oxyCODONE-acetaminophen 5-325 MG per tablet  Commonly known as: PERCOCET              No Known Allergies    Discharge Disposition: Home  Home or Self Care    Diet:  Hospital:  Diet Order   Procedures    Diet: Regular/House Diet; Texture: Regular Texture (IDDSI 7); Fluid Consistency: Thin (IDDSI 0)          Activity: As tolerated      Restrictions or Other Recommendations:       CODE STATUS:    There are no questions and answers to display.       No future appointments.      Paulina Holden MD  11/29/23      Time Spent on Discharge:  I spent  35  minutes on this  discharge activity which included: face-to-face encounter with the patient, reviewing the data in the system, coordination of the care with the nursing staff as well as consultants, documentation, and entering orders.

## 2023-11-28 NOTE — PROGRESS NOTES
[facility] Medicine Services  PROGRESS NOTE    Patient Name: Yg Araujo  : 1980  MRN: [id number]    Date of Admission: 2023  Primary Care Physician: Provider, No Known    Subjective   Subjective     CC: Follow-up pancreatitis    HPI: Patient continues to complain of ongoing abdominal pain, yet to have a bowel movement    Objective   Objective     Vital Signs:   Temp:  [97.7 °F (36.5 °C)-98.7 °F (37.1 °C)] 98 °F (36.7 °C)  Heart Rate:  [56-79] 64  Resp:  [17-18] 17  BP: (123-153)/() 153/120     Physical Exam:  Constitutional: No acute distress, awake, alert  HENT: NCAT, mucous membranes moist  Respiratory: Clear to auscultation bilaterally, respiratory effort normal   Cardiovascular: RRR, no murmurs, rubs, or gallops  Gastrointestinal: Positive bowel sounds, soft, tender to palpation, the left upper quadrant  Musculoskeletal: No bilateral ankle edema  Psychiatric: Appropriate affect, cooperative  Neurologic: Nonfocal  Skin: No rashes      Results Reviewed:  LAB RESULTS:      Lab 23   WBC 8.93   HEMOGLOBIN 16.1   HEMATOCRIT 48.0   PLATELETS 270   NEUTROS ABS 4.86   IMMATURE GRANS (ABS) 0.03   LYMPHS ABS 3.06   MONOS ABS 0.77   EOS ABS 0.18   MCV 87.0         Lab 23   SODIUM 138 135*   POTASSIUM 4.5 4.1   CHLORIDE 103 98   CO2 29.0 27.0   ANION GAP 6.0 10.0   BUN 9 13   CREATININE 0.84 0.86   EGFR 111.7 110.9   GLUCOSE 163* 256*   CALCIUM 8.6 9.3         Lab 2314 23   TOTAL PROTEIN 6.4  --  7.4   ALBUMIN 3.6  --  4.1   GLOBULIN 2.8  --  3.3   ALT (SGPT) 7  --  14   AST (SGOT) 19  --  34   BILIRUBIN 0.7  --  0.7   ALK PHOS 61  --  83   LIPASE  --  244* 287*         Lab 23   HSTROP T 10         Lab 23   CHOLESTEROL  --  279*   LDL CHOL  --  74   HDL CHOL  --  22*   TRIGLYCERIDES 404* 813*  815*             Brief Urine Lab Results  (Last result in the past  365 days)        Color   Clarity   Blood   Leuk Est   Nitrite   Protein   CREAT   Urine HCG        11/24/23 2234 Yellow   Clear   Negative   Negative   Negative   Negative                   Microbiology Results Abnormal       None            No radiology results from the last 24 hrs        Current medications:  Scheduled Meds:atorvastatin, 10 mg, Oral, Nightly  fenofibrate, 48 mg, Oral, Daily  insulin lispro, 2-7 Units, Subcutaneous, 4x Daily AC & at Bedtime  nicotine, 1 patch, Transdermal, Q24H  senna-docusate sodium, 2 tablet, Oral, BID      Continuous Infusions:sodium chloride, 100 mL/hr, Last Rate: 100 mL/hr (11/27/23 2340)      PRN Meds:.  senna-docusate sodium **AND** polyethylene glycol **AND** bisacodyl **AND** bisacodyl    dextrose    dextrose    glucagon (human recombinant)    HYDROcodone-acetaminophen    influenza vaccine    sodium chloride    Assessment & Plan   Assessment & Plan     Active Hospital Problems    Diagnosis  POA    **Acute pancreatitis [K85.90]  Yes      Resolved Hospital Problems   No resolved problems to display.        Brief Hospital Course to date:  Yg Araujo is a 43 y.o. male with history of type 2 diabetes hyperlipidemia, tobacco, alcohol and cocaine abuse who presented with abdominal pain admitted with acute pancreatitis.       Acute pancreatitis, improving  -Suspect EtOH related versus hypertriglyceridemia  -CT concerning for acute pancreatitis, lipase elevated  -Continue supportive therapy, IV fluids, antiemetics, pain control  -Tolerating advanced diet, but complaining of pain  -Continue bowel regimen patient has not had a BM since admission  -Will get a KUB     Hyperlipidemia/hypertriglyceridemia  -Patient denies diagnosis, however has statin and Tricor on his home meds, will continue for the for now  -Triglycerides 815, LDL 74, HDL 22, total cholesterol 279  -Triglycerides improving     Previously well-controlled type 2 diabetes A1c 7.7% (3/30/2022)  -F/u repeat  A1c  -FSBG's reviewed and suboptimal  -Continue SSI for now     Cocaine, tobacco, EtOH abuse  -Continue NRT  -Continue IV fluids  -Not on CIWA    Expected Discharge Location and Transportation: Home  Expected Discharge   Expected Discharge Date: 11/28/2023; Expected Discharge Time:      DVT prophylaxis:  No DVT prophylaxis order currently exists.     AM-PAC 6 Clicks Score (PT): 24 (11/26/23 4744)    CODE STATUS:   There are no questions and answers to display.       Paulina Holden MD  11/28/23

## 2023-11-29 ENCOUNTER — READMISSION MANAGEMENT (OUTPATIENT)
Dept: CALL CENTER | Facility: HOSPITAL | Age: 43
End: 2023-11-29
Payer: MEDICAID

## 2023-11-29 VITALS
BODY MASS INDEX: 29.55 KG/M2 | HEART RATE: 63 BPM | RESPIRATION RATE: 18 BRPM | TEMPERATURE: 98.2 F | SYSTOLIC BLOOD PRESSURE: 133 MMHG | WEIGHT: 195 LBS | OXYGEN SATURATION: 98 % | HEIGHT: 68 IN | DIASTOLIC BLOOD PRESSURE: 98 MMHG

## 2023-11-29 LAB — GLUCOSE BLDC GLUCOMTR-MCNC: 217 MG/DL (ref 70–130)

## 2023-11-29 PROCEDURE — 82948 REAGENT STRIP/BLOOD GLUCOSE: CPT

## 2023-11-29 PROCEDURE — 99239 HOSP IP/OBS DSCHRG MGMT >30: CPT | Performed by: INTERNAL MEDICINE

## 2023-11-29 PROCEDURE — 63710000001 INSULIN LISPRO (HUMAN) PER 5 UNITS: Performed by: INTERNAL MEDICINE

## 2023-11-29 RX ORDER — HYDROCODONE BITARTRATE AND ACETAMINOPHEN 5; 325 MG/1; MG/1
1 TABLET ORAL EVERY 8 HOURS PRN
Qty: 9 TABLET | Refills: 0 | Status: SHIPPED | OUTPATIENT
Start: 2023-11-29 | End: 2023-12-02

## 2023-11-29 RX ADMIN — FENOFIBRATE 48 MG: 48 TABLET ORAL at 08:37

## 2023-11-29 RX ADMIN — HYDROCODONE BITARTRATE AND ACETAMINOPHEN 1 TABLET: 5; 325 TABLET ORAL at 08:37

## 2023-11-29 RX ADMIN — INSULIN LISPRO 3 UNITS: 100 INJECTION, SOLUTION INTRAVENOUS; SUBCUTANEOUS at 08:36

## 2023-11-29 NOTE — OUTREACH NOTE
Prep Survey      Flowsheet Row Responses   [facility] facility patient discharged from? [facility]   Is LACE score < 7 ? No   Eligibility Readm Mgmt   Discharge diagnosis Acute pancreatitis   Does the patient have one of the following disease processes/diagnoses(primary or secondary)? Other   Does the patient have Home health ordered? No   Is there a DME ordered? No   Medication alerts for this patient see AVS   Prep survey completed? Yes            Meli RÍOS - Registered Nurse

## 2023-11-29 NOTE — NURSING NOTE
Pt Axo, Vss, and on roomair. Discharge teaching completed with pt, no questions at this time. Pt refused flu Vaccination when at [facility]. Pt discharged and picked up by family. Tele box cleaned and returned back to [facility].

## 2023-11-29 NOTE — PLAN OF CARE
Problem: Adult Inpatient Plan of Care  Goal: Plan of Care Review  Outcome: Ongoing, Progressing  Goal: Patient-Specific Goal (Individualized)  Outcome: Ongoing, Progressing  Goal: Absence of Hospital-Acquired Illness or Injury  Outcome: Ongoing, Progressing  Intervention: Identify and Manage Fall Risk  Recent Flowsheet Documentation  Taken 11/29/2023 0400 by Nicole Red, RN  Safety Promotion/Fall Prevention:   activity supervised   assistive device/personal items within reach   clutter free environment maintained   nonskid shoes/slippers when out of bed   room organization consistent   safety round/check completed   toileting scheduled  Taken 11/29/2023 0200 by Nicole Red, RN  Safety Promotion/Fall Prevention:   activity supervised   assistive device/personal items within reach   clutter free environment maintained   nonskid shoes/slippers when out of bed   room organization consistent   safety round/check completed   toileting scheduled  Taken 11/29/2023 0000 by Nicole Red, RN  Safety Promotion/Fall Prevention:   activity supervised   assistive device/personal items within reach   clutter free environment maintained   nonskid shoes/slippers when out of bed   room organization consistent   safety round/check completed   toileting scheduled  Taken 11/28/2023 2200 by Nicole Red, RN  Safety Promotion/Fall Prevention:   activity supervised   assistive device/personal items within reach   clutter free environment maintained   nonskid shoes/slippers when out of bed   room organization consistent   safety round/check completed   toileting scheduled  Taken 11/28/2023 2058 by Nicole Red, RN  Safety Promotion/Fall Prevention:   activity supervised   assistive device/personal items within reach   clutter free environment maintained   fall prevention program maintained   nonskid shoes/slippers when out of bed   room organization consistent   safety round/check completed   toileting  scheduled  Intervention: Prevent Skin Injury  Recent Flowsheet Documentation  Taken 11/29/2023 0400 by Nicole Red, RN  Body Position: position changed independently  Skin Protection:   adhesive use limited   tubing/devices free from skin contact  Taken 11/29/2023 0200 by Nicole Red, RN  Body Position: position changed independently  Skin Protection:   adhesive use limited   tubing/devices free from skin contact  Taken 11/29/2023 0000 by Nicole Red, RN  Body Position: position changed independently  Skin Protection:   adhesive use limited   tubing/devices free from skin contact  Taken 11/28/2023 2200 by Nicole Red, RN  Body Position: position changed independently  Skin Protection:   adhesive use limited   tubing/devices free from skin contact  Taken 11/28/2023 2058 by Nicole Red RN  Body Position: position changed independently  Skin Protection:   adhesive use limited   tubing/devices free from skin contact  Intervention: Prevent and Manage VTE (Venous Thromboembolism) Risk  Recent Flowsheet Documentation  Taken 11/29/2023 0400 by Nicole Red RN  Activity Management: up ad berny  Taken 11/29/2023 0200 by Nicole Red RN  Activity Management: up ad berny  Taken 11/29/2023 0000 by Nicole Red, RN  Activity Management: up ad berny  Taken 11/28/2023 2200 by Nicole Red, RN  Activity Management: up ad berny  Taken 11/28/2023 2058 by Nicole Red, RN  Activity Management: up ad berny  VTE Prevention/Management:   bilateral   sequential compression devices off   patient refused intervention  Range of Motion: active ROM (range of motion) encouraged  Intervention: Prevent Infection  Recent Flowsheet Documentation  Taken 11/29/2023 0400 by Nicole Red, RN  Infection Prevention:   environmental surveillance performed   hand hygiene promoted   rest/sleep promoted   single patient room provided  Taken 11/29/2023 0200 by Nicole Red RN  Infection Prevention:    environmental surveillance performed   hand hygiene promoted   rest/sleep promoted   single patient room provided  Taken 11/29/2023 0000 by Nicole Red RN  Infection Prevention:   environmental surveillance performed   hand hygiene promoted   rest/sleep promoted   single patient room provided  Taken 11/28/2023 2200 by Nicole Red RN  Infection Prevention:   environmental surveillance performed   hand hygiene promoted   rest/sleep promoted   single patient room provided  Taken 11/28/2023 2058 by Nicole Red, RN  Infection Prevention:   environmental surveillance performed   hand hygiene promoted   rest/sleep promoted   single patient room provided  Goal: Optimal Comfort and Wellbeing  Outcome: Ongoing, Progressing  Intervention: Provide Person-Centered Care  Recent Flowsheet Documentation  Taken 11/28/2023 2058 by Nicole Red RN  Trust Relationship/Rapport:   care explained   choices provided   questions answered   questions encouraged   thoughts/feelings acknowledged  Goal: Readiness for Transition of Care  Outcome: Ongoing, Progressing     Problem: Pain Acute  Goal: Acceptable Pain Control and Functional Ability  Outcome: Ongoing, Progressing  Intervention: Prevent or Manage Pain  Recent Flowsheet Documentation  Taken 11/28/2023 2058 by Nicole Red RN  Bowel Elimination Promotion: ambulation promoted  Medication Review/Management: medications reviewed  Intervention: Optimize Psychosocial Wellbeing  Recent Flowsheet Documentation  Taken 11/28/2023 2058 by Nicole Red RN  Supportive Measures: active listening utilized  Diversional Activities:   television   smartphone   Goal Outcome Evaluation:

## 2023-12-04 ENCOUNTER — READMISSION MANAGEMENT (OUTPATIENT)
Dept: CALL CENTER | Facility: HOSPITAL | Age: 43
End: 2023-12-04
Payer: MEDICAID

## 2023-12-04 NOTE — OUTREACH NOTE
Medical Week 1 Survey      Flowsheet Row Responses   [facility] patient discharged from? [place]   Does the patient have one of the following disease processes/diagnoses(primary or secondary)? Other   Week 1 attempt successful? Yes   Call start time 1827   Call end time 1829   Discharge diagnosis Acute pancreatitis   Meds reviewed with patient/caregiver? Yes   Is the patient having any side effects they believe may be caused by any medication additions or changes? No   Does the patient have all medications ordered at discharge? N/A   Is the patient taking all medications as directed (includes completed medication regime)? Yes   Does the patient have a primary care provider?  Yes   Does the patient have an appointment with their PCP within 7 days of discharge? No   What is preventing the patient from scheduling follow up appointments within 7 days of discharge? Haven't had time   Nursing Interventions Advised patient to make appointment   Has the patient kept scheduled appointments due by today? N/A   Psychosocial issues? No   Did the patient receive a copy of their discharge instructions? Yes   Nursing interventions Reviewed instructions with patient   What is the patient's perception of their health status since discharge? Improving   Is the patient/caregiver able to teach back signs and symptoms related to disease process for when to call PCP? Yes   Is the patient/caregiver able to teach back signs and symptoms related to disease process for when to call 911? Yes   Is the patient/caregiver able to teach back the hierarchy of who to call/visit for symptoms/problems? PCP, Specialist, Home health nurse, Urgent Care, ED, 911 Yes   Additional teach back comments States he is doing well and will make appt with his PCP.   Week 1 call completed? Yes   Graduated Yes   Graduated/Revoked comments Denies questions or needs at this time.   Call end time 1829            Thalia RAMSAY - Licensed Nurse

## 2024-04-30 ENCOUNTER — APPOINTMENT (OUTPATIENT)
Dept: CT IMAGING | Facility: HOSPITAL | Age: 44
DRG: 440 | End: 2024-04-30
Payer: MEDICAID

## 2024-04-30 ENCOUNTER — HOSPITAL ENCOUNTER (INPATIENT)
Facility: HOSPITAL | Age: 44
LOS: 3 days | Discharge: HOME | DRG: 440 | End: 2024-05-03
Attending: STUDENT IN AN ORGANIZED HEALTH CARE EDUCATION/TRAINING PROGRAM | Admitting: INTERNAL MEDICINE
Payer: MEDICAID

## 2024-04-30 DIAGNOSIS — K85.90 ACUTE RECURRENT PANCREATITIS: ICD-10-CM

## 2024-04-30 DIAGNOSIS — K85.90 ACUTE PANCREATITIS, UNSPECIFIED COMPLICATION STATUS, UNSPECIFIED PANCREATITIS TYPE: Primary | ICD-10-CM

## 2024-04-30 DIAGNOSIS — E78.1 HYPERTRIGLYCERIDEMIA: ICD-10-CM

## 2024-04-30 DIAGNOSIS — E11.65 HYPERGLYCEMIA DUE TO DIABETES MELLITUS: ICD-10-CM

## 2024-04-30 DIAGNOSIS — E11.9 TYPE 2 DIABETES MELLITUS WITHOUT COMPLICATION, WITHOUT LONG-TERM CURRENT USE OF INSULIN: ICD-10-CM

## 2024-04-30 PROBLEM — F19.10 POLYSUBSTANCE ABUSE: Status: ACTIVE | Noted: 2024-04-30

## 2024-04-30 LAB
ALBUMIN SERPL-MCNC: 4.3 G/DL (ref 3.5–5.2)
ALBUMIN/GLOB SERPL: 1.4 G/DL
ALP SERPL-CCNC: 74 U/L (ref 39–117)
ALT SERPL W P-5'-P-CCNC: 15 U/L (ref 1–41)
AMPHET+METHAMPHET UR QL: NEGATIVE
AMPHETAMINES UR QL: NEGATIVE
ANION GAP SERPL CALCULATED.3IONS-SCNC: 10 MMOL/L (ref 5–15)
APAP SERPL-MCNC: <5 MCG/ML (ref 0–30)
AST SERPL-CCNC: 18 U/L (ref 1–40)
BACTERIA UR QL AUTO: NORMAL /HPF
BARBITURATES UR QL SCN: NEGATIVE
BASOPHILS # BLD AUTO: 0.04 10*3/MM3 (ref 0–0.2)
BASOPHILS NFR BLD AUTO: 0.6 % (ref 0–1.5)
BENZODIAZ UR QL SCN: POSITIVE
BILIRUB SERPL-MCNC: 1.1 MG/DL (ref 0–1.2)
BILIRUB UR QL STRIP: ABNORMAL
BUN SERPL-MCNC: 11 MG/DL (ref 6–20)
BUN/CREAT SERPL: 15.5 (ref 7–25)
BUPRENORPHINE SERPL-MCNC: NEGATIVE NG/ML
CALCIUM SPEC-SCNC: 9.6 MG/DL (ref 8.6–10.5)
CANNABINOIDS SERPL QL: POSITIVE
CHLORIDE SERPL-SCNC: 100 MMOL/L (ref 98–107)
CHOLEST SERPL-MCNC: 321 MG/DL (ref 0–200)
CLARITY UR: CLEAR
CO2 SERPL-SCNC: 27 MMOL/L (ref 22–29)
COCAINE UR QL: POSITIVE
COLOR UR: ABNORMAL
CREAT SERPL-MCNC: 0.71 MG/DL (ref 0.76–1.27)
CRP SERPL-MCNC: 0.83 MG/DL (ref 0–0.5)
D-LACTATE SERPL-SCNC: 1.1 MMOL/L (ref 0.5–2)
DEPRECATED RDW RBC AUTO: 39.4 FL (ref 37–54)
EGFRCR SERPLBLD CKD-EPI 2021: 116.7 ML/MIN/1.73
EOSINOPHIL # BLD AUTO: 0.09 10*3/MM3 (ref 0–0.4)
EOSINOPHIL NFR BLD AUTO: 1.3 % (ref 0.3–6.2)
ERYTHROCYTE [DISTWIDTH] IN BLOOD BY AUTOMATED COUNT: 12.5 % (ref 12.3–15.4)
ETHANOL BLD-MCNC: <10 MG/DL (ref 0–10)
FENTANYL UR-MCNC: NEGATIVE NG/ML
FLUAV RNA RESP QL NAA+PROBE: NOT DETECTED
FLUBV RNA RESP QL NAA+PROBE: NOT DETECTED
GLOBULIN UR ELPH-MCNC: 3.1 GM/DL
GLUCOSE BLDC GLUCOMTR-MCNC: 108 MG/DL (ref 70–130)
GLUCOSE BLDC GLUCOMTR-MCNC: 375 MG/DL (ref 70–130)
GLUCOSE BLDC GLUCOMTR-MCNC: 421 MG/DL (ref 70–130)
GLUCOSE SERPL-MCNC: 254 MG/DL (ref 65–99)
GLUCOSE UR STRIP-MCNC: ABNORMAL MG/DL
HAV IGM SERPL QL IA: NORMAL
HBA1C MFR BLD: 10.1 % (ref 4.8–5.6)
HBV CORE IGM SERPL QL IA: NORMAL
HBV SURFACE AG SERPL QL IA: NORMAL
HCT VFR BLD AUTO: 48.4 % (ref 37.5–51)
HCV AB SER QL: NORMAL
HDLC SERPL-MCNC: 38 MG/DL (ref 40–60)
HGB BLD-MCNC: 16.2 G/DL (ref 13–17.7)
HGB UR QL STRIP.AUTO: NEGATIVE
HOLD SPECIMEN: NORMAL
HYALINE CASTS UR QL AUTO: NORMAL /LPF
IMM GRANULOCYTES # BLD AUTO: 0.05 10*3/MM3 (ref 0–0.05)
IMM GRANULOCYTES NFR BLD AUTO: 0.7 % (ref 0–0.5)
KETONES UR QL STRIP: ABNORMAL
LDLC SERPL CALC-MCNC: 182 MG/DL (ref 0–100)
LDLC/HDLC SERPL: 4.84 {RATIO}
LEUKOCYTE ESTERASE UR QL STRIP.AUTO: NEGATIVE
LIPASE SERPL-CCNC: 264 U/L (ref 13–60)
LYMPHOCYTES # BLD AUTO: 2.29 10*3/MM3 (ref 0.7–3.1)
LYMPHOCYTES NFR BLD AUTO: 34.3 % (ref 19.6–45.3)
MAGNESIUM SERPL-MCNC: 1.7 MG/DL (ref 1.6–2.6)
MCH RBC QN AUTO: 28.8 PG (ref 26.6–33)
MCHC RBC AUTO-ENTMCNC: 33.5 G/DL (ref 31.5–35.7)
MCV RBC AUTO: 86.1 FL (ref 79–97)
METHADONE UR QL SCN: NEGATIVE
MONOCYTES # BLD AUTO: 0.53 10*3/MM3 (ref 0.1–0.9)
MONOCYTES NFR BLD AUTO: 7.9 % (ref 5–12)
NEUTROPHILS NFR BLD AUTO: 3.67 10*3/MM3 (ref 1.7–7)
NEUTROPHILS NFR BLD AUTO: 55.2 % (ref 42.7–76)
NITRITE UR QL STRIP: NEGATIVE
NRBC BLD AUTO-RTO: 0 /100 WBC (ref 0–0.2)
OPIATES UR QL: NEGATIVE
OXYCODONE UR QL SCN: NEGATIVE
PCP UR QL SCN: NEGATIVE
PH UR STRIP.AUTO: 5.5 [PH] (ref 5–8)
PHOSPHATE SERPL-MCNC: 2.8 MG/DL (ref 2.5–4.5)
PLATELET # BLD AUTO: 282 10*3/MM3 (ref 140–450)
PMV BLD AUTO: 10.4 FL (ref 6–12)
POTASSIUM SERPL-SCNC: 4 MMOL/L (ref 3.5–5.2)
PROT SERPL-MCNC: 7.4 G/DL (ref 6–8.5)
PROT UR QL STRIP: ABNORMAL
QT INTERVAL: 386 MS
QTC INTERVAL: 413 MS
RBC # BLD AUTO: 5.62 10*6/MM3 (ref 4.14–5.8)
RBC # UR STRIP: NORMAL /HPF
REF LAB TEST METHOD: NORMAL
RSV RNA RESP QL NAA+PROBE: NOT DETECTED
SALICYLATES SERPL-MCNC: <0.3 MG/DL
SARS-COV-2 RNA RESP QL NAA+PROBE: NOT DETECTED
SODIUM SERPL-SCNC: 137 MMOL/L (ref 136–145)
SP GR UR STRIP: 1.04 (ref 1–1.03)
SQUAMOUS #/AREA URNS HPF: NORMAL /HPF
TRICYCLICS UR QL SCN: POSITIVE
TRIGL SERPL-MCNC: 495 MG/DL (ref 0–150)
TROPONIN T SERPL HS-MCNC: 13 NG/L
UROBILINOGEN UR QL STRIP: ABNORMAL
VLDLC SERPL-MCNC: 101 MG/DL (ref 5–40)
WBC # UR STRIP: NORMAL /HPF
WBC NRBC COR # BLD AUTO: 6.67 10*3/MM3 (ref 3.4–10.8)
WHOLE BLOOD HOLD COAG: NORMAL
WHOLE BLOOD HOLD SPECIMEN: NORMAL

## 2024-04-30 PROCEDURE — 80143 DRUG ASSAY ACETAMINOPHEN: CPT | Performed by: STUDENT IN AN ORGANIZED HEALTH CARE EDUCATION/TRAINING PROGRAM

## 2024-04-30 PROCEDURE — 87637 SARSCOV2&INF A&B&RSV AMP PRB: CPT | Performed by: STUDENT IN AN ORGANIZED HEALTH CARE EDUCATION/TRAINING PROGRAM

## 2024-04-30 PROCEDURE — 80053 COMPREHEN METABOLIC PANEL: CPT | Performed by: STUDENT IN AN ORGANIZED HEALTH CARE EDUCATION/TRAINING PROGRAM

## 2024-04-30 PROCEDURE — 83690 ASSAY OF LIPASE: CPT | Performed by: STUDENT IN AN ORGANIZED HEALTH CARE EDUCATION/TRAINING PROGRAM

## 2024-04-30 PROCEDURE — G0378 HOSPITAL OBSERVATION PER HR: HCPCS

## 2024-04-30 PROCEDURE — 80061 LIPID PANEL: CPT | Performed by: STUDENT IN AN ORGANIZED HEALTH CARE EDUCATION/TRAINING PROGRAM

## 2024-04-30 PROCEDURE — 80074 ACUTE HEPATITIS PANEL: CPT | Performed by: STUDENT IN AN ORGANIZED HEALTH CARE EDUCATION/TRAINING PROGRAM

## 2024-04-30 PROCEDURE — 25010000002 KETOROLAC TROMETHAMINE PER 15 MG: Performed by: STUDENT IN AN ORGANIZED HEALTH CARE EDUCATION/TRAINING PROGRAM

## 2024-04-30 PROCEDURE — 83036 HEMOGLOBIN GLYCOSYLATED A1C: CPT | Performed by: STUDENT IN AN ORGANIZED HEALTH CARE EDUCATION/TRAINING PROGRAM

## 2024-04-30 PROCEDURE — 99222 1ST HOSP IP/OBS MODERATE 55: CPT | Performed by: INTERNAL MEDICINE

## 2024-04-30 PROCEDURE — 93005 ELECTROCARDIOGRAM TRACING: CPT | Performed by: STUDENT IN AN ORGANIZED HEALTH CARE EDUCATION/TRAINING PROGRAM

## 2024-04-30 PROCEDURE — 25010000002 ENOXAPARIN PER 10 MG: Performed by: INTERNAL MEDICINE

## 2024-04-30 PROCEDURE — 82948 REAGENT STRIP/BLOOD GLUCOSE: CPT

## 2024-04-30 PROCEDURE — 80179 DRUG ASSAY SALICYLATE: CPT | Performed by: STUDENT IN AN ORGANIZED HEALTH CARE EDUCATION/TRAINING PROGRAM

## 2024-04-30 PROCEDURE — 84100 ASSAY OF PHOSPHORUS: CPT | Performed by: STUDENT IN AN ORGANIZED HEALTH CARE EDUCATION/TRAINING PROGRAM

## 2024-04-30 PROCEDURE — 25510000001 IOPAMIDOL 61 % SOLUTION: Performed by: STUDENT IN AN ORGANIZED HEALTH CARE EDUCATION/TRAINING PROGRAM

## 2024-04-30 PROCEDURE — 63710000001 INSULIN LISPRO (HUMAN) PER 5 UNITS: Performed by: INTERNAL MEDICINE

## 2024-04-30 PROCEDURE — 74177 CT ABD & PELVIS W/CONTRAST: CPT

## 2024-04-30 PROCEDURE — 80307 DRUG TEST PRSMV CHEM ANLYZR: CPT | Performed by: STUDENT IN AN ORGANIZED HEALTH CARE EDUCATION/TRAINING PROGRAM

## 2024-04-30 PROCEDURE — 86140 C-REACTIVE PROTEIN: CPT | Performed by: STUDENT IN AN ORGANIZED HEALTH CARE EDUCATION/TRAINING PROGRAM

## 2024-04-30 PROCEDURE — 25810000003 LACTATED RINGERS PER 1000 ML: Performed by: STUDENT IN AN ORGANIZED HEALTH CARE EDUCATION/TRAINING PROGRAM

## 2024-04-30 PROCEDURE — 83735 ASSAY OF MAGNESIUM: CPT | Performed by: STUDENT IN AN ORGANIZED HEALTH CARE EDUCATION/TRAINING PROGRAM

## 2024-04-30 PROCEDURE — 85025 COMPLETE CBC W/AUTO DIFF WBC: CPT | Performed by: STUDENT IN AN ORGANIZED HEALTH CARE EDUCATION/TRAINING PROGRAM

## 2024-04-30 PROCEDURE — 84484 ASSAY OF TROPONIN QUANT: CPT | Performed by: STUDENT IN AN ORGANIZED HEALTH CARE EDUCATION/TRAINING PROGRAM

## 2024-04-30 PROCEDURE — 25810000003 SODIUM CHLORIDE 0.9 % SOLUTION: Performed by: STUDENT IN AN ORGANIZED HEALTH CARE EDUCATION/TRAINING PROGRAM

## 2024-04-30 PROCEDURE — 25010000002 DICYCLOMINE PER 20 MG: Performed by: STUDENT IN AN ORGANIZED HEALTH CARE EDUCATION/TRAINING PROGRAM

## 2024-04-30 PROCEDURE — 25010000002 HYDROMORPHONE PER 4 MG: Performed by: INTERNAL MEDICINE

## 2024-04-30 PROCEDURE — 81001 URINALYSIS AUTO W/SCOPE: CPT | Performed by: STUDENT IN AN ORGANIZED HEALTH CARE EDUCATION/TRAINING PROGRAM

## 2024-04-30 PROCEDURE — 99285 EMERGENCY DEPT VISIT HI MDM: CPT

## 2024-04-30 PROCEDURE — 25810000003 SODIUM CHLORIDE 0.9 % SOLUTION: Performed by: INTERNAL MEDICINE

## 2024-04-30 PROCEDURE — 83605 ASSAY OF LACTIC ACID: CPT | Performed by: STUDENT IN AN ORGANIZED HEALTH CARE EDUCATION/TRAINING PROGRAM

## 2024-04-30 PROCEDURE — 25010000002 ONDANSETRON PER 1 MG: Performed by: STUDENT IN AN ORGANIZED HEALTH CARE EDUCATION/TRAINING PROGRAM

## 2024-04-30 PROCEDURE — 82077 ASSAY SPEC XCP UR&BREATH IA: CPT | Performed by: STUDENT IN AN ORGANIZED HEALTH CARE EDUCATION/TRAINING PROGRAM

## 2024-04-30 RX ORDER — NICOTINE 21 MG/24HR
1 PATCH, TRANSDERMAL 24 HOURS TRANSDERMAL
Status: DISCONTINUED | OUTPATIENT
Start: 2024-04-30 | End: 2024-05-03 | Stop reason: HOSPADM

## 2024-04-30 RX ORDER — ATORVASTATIN CALCIUM 10 MG/1
10 TABLET, FILM COATED ORAL NIGHTLY
Status: DISCONTINUED | OUTPATIENT
Start: 2024-04-30 | End: 2024-05-03 | Stop reason: HOSPADM

## 2024-04-30 RX ORDER — SODIUM CHLORIDE 0.9 % (FLUSH) 0.9 %
10 SYRINGE (ML) INJECTION AS NEEDED
Status: DISCONTINUED | OUTPATIENT
Start: 2024-04-30 | End: 2024-05-03 | Stop reason: HOSPADM

## 2024-04-30 RX ORDER — KETOROLAC TROMETHAMINE 15 MG/ML
15 INJECTION, SOLUTION INTRAMUSCULAR; INTRAVENOUS ONCE
Status: COMPLETED | OUTPATIENT
Start: 2024-04-30 | End: 2024-04-30

## 2024-04-30 RX ORDER — AMOXICILLIN 250 MG
2 CAPSULE ORAL 2 TIMES DAILY PRN
Status: DISCONTINUED | OUTPATIENT
Start: 2024-04-30 | End: 2024-05-03 | Stop reason: HOSPADM

## 2024-04-30 RX ORDER — HYDROMORPHONE HYDROCHLORIDE 1 MG/ML
0.5 INJECTION, SOLUTION INTRAMUSCULAR; INTRAVENOUS; SUBCUTANEOUS
Status: DISCONTINUED | OUTPATIENT
Start: 2024-04-30 | End: 2024-05-02

## 2024-04-30 RX ORDER — FENOFIBRATE 145 MG/1
145 TABLET, COATED ORAL DAILY
Status: DISCONTINUED | OUTPATIENT
Start: 2024-04-30 | End: 2024-05-03 | Stop reason: HOSPADM

## 2024-04-30 RX ORDER — ONDANSETRON 2 MG/ML
4 INJECTION INTRAMUSCULAR; INTRAVENOUS EVERY 6 HOURS PRN
Status: DISCONTINUED | OUTPATIENT
Start: 2024-04-30 | End: 2024-05-03 | Stop reason: HOSPADM

## 2024-04-30 RX ORDER — NICOTINE POLACRILEX 4 MG
15 LOZENGE BUCCAL
Status: DISCONTINUED | OUTPATIENT
Start: 2024-04-30 | End: 2024-05-03 | Stop reason: HOSPADM

## 2024-04-30 RX ORDER — SODIUM CHLORIDE 0.9 % (FLUSH) 0.9 %
10 SYRINGE (ML) INJECTION EVERY 12 HOURS SCHEDULED
Status: DISCONTINUED | OUTPATIENT
Start: 2024-04-30 | End: 2024-05-03 | Stop reason: HOSPADM

## 2024-04-30 RX ORDER — ONDANSETRON 4 MG/1
4 TABLET, ORALLY DISINTEGRATING ORAL EVERY 6 HOURS PRN
Status: DISCONTINUED | OUTPATIENT
Start: 2024-04-30 | End: 2024-05-03 | Stop reason: HOSPADM

## 2024-04-30 RX ORDER — ACETAMINOPHEN 325 MG/1
650 TABLET ORAL EVERY 4 HOURS PRN
Status: DISCONTINUED | OUTPATIENT
Start: 2024-04-30 | End: 2024-05-03 | Stop reason: HOSPADM

## 2024-04-30 RX ORDER — BISACODYL 5 MG/1
5 TABLET, DELAYED RELEASE ORAL DAILY PRN
Status: DISCONTINUED | OUTPATIENT
Start: 2024-04-30 | End: 2024-05-03 | Stop reason: HOSPADM

## 2024-04-30 RX ORDER — PANTOPRAZOLE SODIUM 40 MG/1
40 TABLET, DELAYED RELEASE ORAL
Status: DISCONTINUED | OUTPATIENT
Start: 2024-04-30 | End: 2024-05-03 | Stop reason: HOSPADM

## 2024-04-30 RX ORDER — ENOXAPARIN SODIUM 100 MG/ML
40 INJECTION SUBCUTANEOUS DAILY
Status: DISCONTINUED | OUTPATIENT
Start: 2024-04-30 | End: 2024-05-03 | Stop reason: HOSPADM

## 2024-04-30 RX ORDER — SODIUM CHLORIDE 9 MG/ML
10 INJECTION, SOLUTION INTRAMUSCULAR; INTRAVENOUS; SUBCUTANEOUS AS NEEDED
Status: DISCONTINUED | OUTPATIENT
Start: 2024-04-30 | End: 2024-05-03 | Stop reason: HOSPADM

## 2024-04-30 RX ORDER — BISACODYL 10 MG
10 SUPPOSITORY, RECTAL RECTAL DAILY PRN
Status: DISCONTINUED | OUTPATIENT
Start: 2024-04-30 | End: 2024-05-03 | Stop reason: HOSPADM

## 2024-04-30 RX ORDER — ACETAMINOPHEN 160 MG/5ML
650 SOLUTION ORAL EVERY 4 HOURS PRN
Status: DISCONTINUED | OUTPATIENT
Start: 2024-04-30 | End: 2024-05-03 | Stop reason: HOSPADM

## 2024-04-30 RX ORDER — SODIUM CHLORIDE, SODIUM LACTATE, POTASSIUM CHLORIDE, CALCIUM CHLORIDE 600; 310; 30; 20 MG/100ML; MG/100ML; MG/100ML; MG/100ML
125 INJECTION, SOLUTION INTRAVENOUS CONTINUOUS
Status: DISCONTINUED | OUTPATIENT
Start: 2024-04-30 | End: 2024-05-01

## 2024-04-30 RX ORDER — DICYCLOMINE HYDROCHLORIDE 10 MG/ML
20 INJECTION INTRAMUSCULAR ONCE
Status: COMPLETED | OUTPATIENT
Start: 2024-04-30 | End: 2024-04-30

## 2024-04-30 RX ORDER — NALOXONE HCL 0.4 MG/ML
0.4 VIAL (ML) INJECTION
Status: DISCONTINUED | OUTPATIENT
Start: 2024-04-30 | End: 2024-05-03 | Stop reason: HOSPADM

## 2024-04-30 RX ORDER — ACETAMINOPHEN 650 MG/1
650 SUPPOSITORY RECTAL EVERY 4 HOURS PRN
Status: DISCONTINUED | OUTPATIENT
Start: 2024-04-30 | End: 2024-05-03 | Stop reason: HOSPADM

## 2024-04-30 RX ORDER — TRAMADOL HYDROCHLORIDE 50 MG/1
50 TABLET ORAL EVERY 6 HOURS PRN
Status: DISCONTINUED | OUTPATIENT
Start: 2024-04-30 | End: 2024-05-01

## 2024-04-30 RX ORDER — INSULIN LISPRO 100 [IU]/ML
2-9 INJECTION, SOLUTION INTRAVENOUS; SUBCUTANEOUS
Status: DISCONTINUED | OUTPATIENT
Start: 2024-04-30 | End: 2024-05-03 | Stop reason: HOSPADM

## 2024-04-30 RX ORDER — DEXTROSE MONOHYDRATE 25 G/50ML
25 INJECTION, SOLUTION INTRAVENOUS
Status: DISCONTINUED | OUTPATIENT
Start: 2024-04-30 | End: 2024-05-03 | Stop reason: HOSPADM

## 2024-04-30 RX ORDER — SODIUM CHLORIDE 9 MG/ML
40 INJECTION, SOLUTION INTRAVENOUS AS NEEDED
Status: DISCONTINUED | OUTPATIENT
Start: 2024-04-30 | End: 2024-05-03 | Stop reason: HOSPADM

## 2024-04-30 RX ORDER — SODIUM CHLORIDE 9 MG/ML
125 INJECTION, SOLUTION INTRAVENOUS CONTINUOUS
Status: DISCONTINUED | OUTPATIENT
Start: 2024-04-30 | End: 2024-05-03 | Stop reason: HOSPADM

## 2024-04-30 RX ORDER — HYDROCODONE BITARTRATE AND ACETAMINOPHEN 5; 325 MG/1; MG/1
1 TABLET ORAL EVERY 4 HOURS PRN
Status: DISCONTINUED | OUTPATIENT
Start: 2024-04-30 | End: 2024-05-02

## 2024-04-30 RX ORDER — ONDANSETRON 2 MG/ML
4 INJECTION INTRAMUSCULAR; INTRAVENOUS ONCE
Status: COMPLETED | OUTPATIENT
Start: 2024-04-30 | End: 2024-04-30

## 2024-04-30 RX ORDER — POLYETHYLENE GLYCOL 3350 17 G/17G
17 POWDER, FOR SOLUTION ORAL DAILY PRN
Status: DISCONTINUED | OUTPATIENT
Start: 2024-04-30 | End: 2024-05-03 | Stop reason: HOSPADM

## 2024-04-30 RX ORDER — IBUPROFEN 600 MG/1
1 TABLET ORAL
Status: DISCONTINUED | OUTPATIENT
Start: 2024-04-30 | End: 2024-05-03 | Stop reason: HOSPADM

## 2024-04-30 RX ADMIN — SODIUM CHLORIDE 100 ML/HR: 9 INJECTION, SOLUTION INTRAVENOUS at 21:09

## 2024-04-30 RX ADMIN — Medication 10 ML: at 21:08

## 2024-04-30 RX ADMIN — FENOFIBRATE 145 MG: 145 TABLET ORAL at 21:04

## 2024-04-30 RX ADMIN — HYDROMORPHONE HYDROCHLORIDE 0.5 MG: 1 INJECTION, SOLUTION INTRAMUSCULAR; INTRAVENOUS; SUBCUTANEOUS at 13:42

## 2024-04-30 RX ADMIN — SODIUM CHLORIDE, POTASSIUM CHLORIDE, SODIUM LACTATE AND CALCIUM CHLORIDE 125 ML/HR: 600; 310; 30; 20 INJECTION, SOLUTION INTRAVENOUS at 13:17

## 2024-04-30 RX ADMIN — HYDROMORPHONE HYDROCHLORIDE 0.5 MG: 1 INJECTION, SOLUTION INTRAMUSCULAR; INTRAVENOUS; SUBCUTANEOUS at 15:43

## 2024-04-30 RX ADMIN — ATORVASTATIN CALCIUM 10 MG: 10 TABLET, FILM COATED ORAL at 21:03

## 2024-04-30 RX ADMIN — HYDROMORPHONE HYDROCHLORIDE 0.5 MG: 1 INJECTION, SOLUTION INTRAMUSCULAR; INTRAVENOUS; SUBCUTANEOUS at 21:04

## 2024-04-30 RX ADMIN — ONDANSETRON 4 MG: 2 INJECTION INTRAMUSCULAR; INTRAVENOUS at 11:32

## 2024-04-30 RX ADMIN — DICYCLOMINE HYDROCHLORIDE 20 MG: 10 INJECTION, SOLUTION INTRAMUSCULAR at 11:34

## 2024-04-30 RX ADMIN — SODIUM CHLORIDE 1000 ML: 9 INJECTION, SOLUTION INTRAVENOUS at 11:31

## 2024-04-30 RX ADMIN — PANTOPRAZOLE SODIUM 40 MG: 40 TABLET, DELAYED RELEASE ORAL at 21:03

## 2024-04-30 RX ADMIN — IOPAMIDOL 83 ML: 612 INJECTION, SOLUTION INTRAVENOUS at 12:46

## 2024-04-30 RX ADMIN — KETOROLAC TROMETHAMINE 15 MG: 15 INJECTION, SOLUTION INTRAMUSCULAR; INTRAVENOUS at 11:33

## 2024-04-30 RX ADMIN — INSULIN LISPRO 8 UNITS: 100 INJECTION, SOLUTION INTRAVENOUS; SUBCUTANEOUS at 18:51

## 2024-04-30 RX ADMIN — ENOXAPARIN SODIUM 40 MG: 100 INJECTION SUBCUTANEOUS at 21:03

## 2024-04-30 NOTE — H&P
[facility] Medicine Services  HISTORY AND PHYSICAL    Patient Name: Yg Araujo  : 1980  MRN: [id number]  Primary Care Physician: Jeovany, No Known  Date of admission: 2024      Subjective   Subjective     Chief Complaint: abd pain    HPI:  Yg Araujo is a 44 y.o. male presenting with acute on chronic abdominal pain. Patient says he initially had worsening of pain about 1.5 weeks ago after eating a steak that was thawed 2 separate times. Pain got some better but then abruptly worsened 3 days ago at which time had vomiting, diarrhea, and worsening pain. He has not been drinking ETOH. Has not been taking his Tricor or his metformin.     Personal History     Past Medical History:   Diagnosis Date    Alcohol abuse     Diverticulitis     GERD (gastroesophageal reflux disease)     HLD (hyperlipidemia)     HTN (hypertension)     Pancreatitis     Pancreatitis     Polysubstance abuse     Type 2 diabetes mellitus            History reviewed. No pertinent surgical history.    Family History: family history includes Diabetes in his father; Heart disease in his father; Hypertension in his father.     Social History:  reports that he has been smoking cigarettes. He started smoking about 29 years ago. He has a 44 pack-year smoking history. He has never used smokeless tobacco. He reports current alcohol use of about 24.0 standard drinks of alcohol per week. He reports current drug use. Drugs: Marijuana and Cocaine(coke).  Social History     Social History Narrative    Not on file       Medications:  Available home medication information reviewed.  atorvastatin, fenofibrate, metFORMIN ER, nicotine, ondansetron, pantoprazole, and traMADol    No Known Allergies    Objective   Objective     Vital Signs:   Temp:  [98.7 °F (37.1 °C)] 98.7 °F (37.1 °C)  Heart Rate:  [66-82] 66  Resp:  [17] 17  BP: (115-125)/(79-96) 116/95       Physical Exam   Constitutional: Awake, alert  Eyes: SAEED  sclerae anicteric, no conjunctival injection  HENT: NCAT, mucous membranes moist  Neck: Supple, no thyromegaly, no lymphadenopathy, trachea midline  Respiratory: Clear to auscultation bilaterally, nonlabored respirations   Cardiovascular: RRR, no murmurs, rubs, or gallops, palpable pedal pulses bilaterally  Gastrointestinal: Positive bowel sounds, soft, nontender, nondistended  Musculoskeletal: No bilateral ankle edema, no clubbing or cyanosis to extremities  Psychiatric: Appropriate affect, cooperative  Neurologic: Oriented x 3, strength symmetric in all extremities, Cranial Nerves grossly intact to confrontation, speech clear  Skin: No rashes     Result Review:  I have personally reviewed the results from the time of this admission to 4/30/2024 13:49 EDT and agree with these findings:  []  Laboratory list / accordion  []  Microbiology  []  Radiology  []  EKG/Telemetry   []  Cardiology/Vascular   []  Pathology  []  Old records  []  Other:  Most notable findings include:       LAB RESULTS:      Lab 04/30/24  1049   WBC 6.67   HEMOGLOBIN 16.2   HEMATOCRIT 48.4   PLATELETS 282   NEUTROS ABS 3.67   IMMATURE GRANS (ABS) 0.05   LYMPHS ABS 2.29   MONOS ABS 0.53   EOS ABS 0.09   MCV 86.1   CRP 0.83*   LACTATE 1.1         Lab 04/30/24  1049   SODIUM 137   POTASSIUM 4.0   CHLORIDE 100   CO2 27.0   ANION GAP 10.0   BUN 11   CREATININE 0.71*   EGFR 116.7   GLUCOSE 254*   CALCIUM 9.6   MAGNESIUM 1.7   PHOSPHORUS 2.8   HEMOGLOBIN A1C 10.10*         Lab 04/30/24  1049   TOTAL PROTEIN 7.4   ALBUMIN 4.3   GLOBULIN 3.1   ALT (SGPT) 15   AST (SGOT) 18   BILIRUBIN 1.1   ALK PHOS 74   LIPASE 264*         Lab 04/30/24  1049   HSTROP T 13         Lab 04/30/24  1049   CHOLESTEROL 321*   LDL CHOL 182*   HDL CHOL 38*   TRIGLYCERIDES 495*             UA          11/24/2023    22:34 4/30/2024    10:49   Urinalysis   Squamous Epithelial Cells, UA  0-2    Specific Gravity, UA 1.063  1.038    Ketones, UA Trace  Trace    Blood, UA Negative   Negative    Leukocytes, UA Negative  Negative    Nitrite, UA Negative  Negative    RBC, UA  0-2    WBC, UA  0-2    Bacteria, UA  None Seen        Microbiology Results (last 10 days)       Procedure Component Value - Date/Time    COVID PRE-OP / PRE-PROCEDURE SCREENING ORDER (NO ISOLATION) - Swab, Nasopharynx [[id number]]  (Normal) Collected: 04/30/24 1136    Lab Status: Final result Specimen: Swab from Nasopharynx Updated: 04/30/24 1231    Narrative:      The following orders were created for panel order COVID PRE-OP / PRE-PROCEDURE SCREENING ORDER (NO ISOLATION) - Swab, Nasopharynx.  Procedure                               Abnormality         Status                     ---------                               -----------         ------                     COVID-19, FLU A/B, RSV P...[[id number]]  Normal              Final result                 Please view results for these tests on the individual orders.    COVID-19, FLU A/B, RSV PCR 1 HR TAT - Swab, Nasopharynx [[id number]]  (Normal) Collected: 04/30/24 1136    Lab Status: Final result Specimen: Swab from Nasopharynx Updated: 04/30/24 1231     COVID19 Not Detected     Influenza A PCR Not Detected     Influenza B PCR Not Detected     RSV, PCR Not Detected    Narrative:      Fact sheet for providers: https://www.fda.gov/media/194763/download    Fact sheet for patients: https://www.fda.gov/media/867283/download    Test performed by PCR.            CT Abdomen Pelvis With Contrast    Result Date: 4/30/2024  CT ABDOMEN PELVIS W CONTRAST Date of Exam: 4/30/2024 12:37 PM EDT Indication: Severe epigastric pain with nausea and vomiting. Comparison: 11/24/2023 Technique: Axial CT images were obtained of the abdomen and pelvis following the uneventful intravenous administration of 83 mL Isovue-300. Reconstructed coronal and sagittal images were also obtained. Automated exposure control and iterative construction methods were used. Findings: There is mild edema with fat stranding  around the pancreatic body and proximal tail. There is no evidence of a focal fluid collection. There is no pancreatic ductal dilatation. The pancreas is overall normal in size. There are no liver lesions. The gallbladder and biliary tree are nondilated. The adrenal glands and spleen appear normal. There is no renal lesion or hydronephrosis. The bladder is incompletely distended. The prostate gland is normal in size. There is diverticulosis without findings of  acute diverticulitis. There is a normal appendix. There is no bowel wall thickening.     Impression: Impression: Findings are compatible with acute pancreatitis. No evidence of ductal dilatation, necrosis, or abscess formation. Electronically Signed: Sharon Nguyen MD  4/30/2024 1:00 PM EDT  Workstation ID: [id number]         Assessment & Plan   Assessment & Plan       Pancreatitis    Type 2 diabetes mellitus    Hypertriglyceridemia    GERD without esophagitis    Polysubstance abuse    43 y/o male with hx of recurrent ETOH pancreatitis returning with pancreatitis.    Acute/chronic pancreatitis  Polysubstance abuse  Hypertriglyceridemia  --Patient denies ETOH abuse but currently w/ several illicit substances in UDS. Is also still smoking. Suspect this in addition to medical non compliance with Tricor as cause of his recurrent pancreatitis.  --Pain control, antiemetics.  --CLD    DM2  --SSI    GERD  --PPI      DVT prophylaxis:  Medical DVT prophylaxis orders are signed and held.            CODE STATUS:    Code Status and Medical Interventions:   Ordered at: 04/30/24 1324     Code Status (Patient has no pulse and is not breathing):    CPR (Attempt to Resuscitate)     Medical Interventions (Patient has pulse or is breathing):    Full Support       Expected Discharge   Expected discharge date/ time has not been documented.     Zainab Al II, DO  04/30/24

## 2024-04-30 NOTE — ED NOTES
Yg Araujo    Nursing Report ED to Floor:  Mental status: A&Ox4  Ambulatory status: self  Oxygen Therapy:  RA  Cardiac Rhythm: NSR  Admitted from: ED  Safety Concerns: fall  Social Issues: none  ED Room #:  19    ED Nurse Phone Extension - [phone] or may call [phone].      HPI:   Chief Complaint   Patient presents with    Abdominal Pain       Past Medical History:  Past Medical History:   Diagnosis Date    Alcohol abuse     Diverticulitis     GERD (gastroesophageal reflux disease)     HLD (hyperlipidemia)     HTN (hypertension)     Pancreatitis     Pancreatitis     Polysubstance abuse     Type 2 diabetes mellitus         Past Surgical History:  History reviewed. No pertinent surgical history.     Admitting Doctor:   Zainab Al II, DO    Consulting Provider(s):  Consults       No orders found from 4/1/2024 to 5/1/2024.             Admitting Diagnosis:   The primary encounter diagnosis was Acute pancreatitis, unspecified complication status, unspecified pancreatitis type. Diagnoses of Hyperglycemia due to diabetes mellitus, Type 2 diabetes mellitus without complication, without long-term current use of insulin, Acute recurrent pancreatitis, and Hypertriglyceridemia were also pertinent to this visit.    Most Recent Vitals:   Vitals:    04/30/24 1130 04/30/24 1200 04/30/24 1230 04/30/24 1300   BP: 124/96 125/96 118/79 115/89   BP Location:       Patient Position:       Pulse: 73 67 73 67   Resp:       Temp:       TempSrc:       SpO2: 97% 99% 97% 97%   Weight:       Height:           Active LDAs/IV Access:   Lines, Drains & Airways       Active LDAs       Name Placement date Placement time Site Days    Peripheral IV 04/30/24 1046 Left Antecubital 04/30/24  1046  Antecubital  less than 1                    Labs (abnormal labs have a star):   Labs Reviewed   COMPREHENSIVE METABOLIC PANEL - Abnormal; Notable for the following components:       Result Value    Glucose 254 (*)     Creatinine 0.71 (*)     All other  components within normal limits    Narrative:     GFR Normal >60  Chronic Kidney Disease <60  Kidney Failure <15     LIPASE - Abnormal; Notable for the following components:    Lipase 264 (*)     All other components within normal limits   URINALYSIS W/ MICROSCOPIC IF INDICATED (NO CULTURE) - Abnormal; Notable for the following components:    Color, UA Dark Yellow (*)     Specific Gravity, UA 1.038 (*)     Glucose, UA >=1000 mg/dL (3+) (*)     Ketones, UA Trace (*)     Bilirubin, UA Small (1+) (*)     Protein, UA 30 mg/dL (1+) (*)     All other components within normal limits   CBC WITH AUTO DIFFERENTIAL - Abnormal; Notable for the following components:    Immature Grans % 0.7 (*)     All other components within normal limits   C-REACTIVE PROTEIN - Abnormal; Notable for the following components:    C-Reactive Protein 0.83 (*)     All other components within normal limits   URINE DRUG SCREEN - Abnormal; Notable for the following components:    THC, Screen, Urine Positive (*)     Cocaine Screen, Urine Positive (*)     Benzodiazepine Screen, Urine Positive (*)     Tricyclic Antidepressants Screen Positive (*)     All other components within normal limits    Narrative:     Cutoff For Drugs Screened:    Amphetamines               500 ng/ml  Barbiturates               200 ng/ml  Benzodiazepines            150 ng/ml  Cocaine                    150 ng/ml  Methadone                  200 ng/ml  Opiates                    100 ng/ml  Phencyclidine               25 ng/ml  THC                         50 ng/ml  Methamphetamine            500 ng/ml  Tricyclic Antidepressants  300 ng/ml  Oxycodone                  100 ng/ml  Buprenorphine               10 ng/ml    The normal value for all drugs tested is negative. This report includes unconfirmed screening results, with the cutoff values listed, to be used for medical treatment purposes only.  Unconfirmed results must not be used for non-medical purposes such as employment or legal  testing.  Clinical consideration should be applied to any drug of abuse test, particularly when unconfirmed results are used.     LIPID PANEL - Abnormal; Notable for the following components:    Total Cholesterol 321 (*)     Triglycerides 495 (*)     HDL Cholesterol 38 (*)     LDL Cholesterol  182 (*)     VLDL Cholesterol 101 (*)     All other components within normal limits    Narrative:     Cholesterol Reference Ranges  (U.S. Department of Health and Human Services ATP III Classifications)    Desirable          <200 mg/dL  Borderline High    200-239 mg/dL  High Risk          >240 mg/dL      Triglyceride Reference Ranges  (U.S. Department of Health and Human Services ATP III Classifications)    Normal           <150 mg/dL  Borderline High  150-199 mg/dL  High             200-499 mg/dL  Very High        >500 mg/dL    HDL Reference Ranges  (U.S. Department of Health and Human Services ATP III Classifications)    Low     <40 mg/dl (major risk factor for CHD)  High    >60 mg/dl ('negative' risk factor for CHD)        LDL Reference Ranges  (U.S. Department of Health and Human Services ATP III Classifications)    Optimal          <100 mg/dL  Near Optimal     100-129 mg/dL  Borderline High  130-159 mg/dL  High             160-189 mg/dL  Very High        >189 mg/dL   HEMOGLOBIN A1C - Abnormal; Notable for the following components:    Hemoglobin A1C 10.10 (*)     All other components within normal limits    Narrative:     Hemoglobin A1C Ranges:    Increased Risk for Diabetes  5.7% to 6.4%  Diabetes                     >= 6.5%  Diabetic Goal                < 7.0%   COVID-19/FLUA&B/RSV, NP SWAB IN TRANSPORT MEDIA 1 HR TAT - Normal    Narrative:     Fact sheet for providers: https://www.fda.gov/media/344875/download    Fact sheet for patients: https://www.fda.gov/media/094593/download    Test performed by PCR.   SINGLE HS TROPONIN T - Normal    Narrative:     High Sensitive Troponin T Reference Range:  <14.0 ng/L- Negative  Female for AMI  <22.0 ng/L- Negative Male for AMI  >=14 - Abnormal Female indicating possible myocardial injury.  >=22 - Abnormal Male indicating possible myocardial injury.   Clinicians would have to utilize clinical acumen, EKG, Troponin, and serial changes to determine if it is an Acute Myocardial Infarction or myocardial injury due to an underlying chronic condition.        LACTIC ACID, PLASMA - Normal   ACETAMINOPHEN LEVEL - Normal   ETHANOL - Normal    Narrative:     Elevated lactic acid concentration and lactate dehydrogenase(LD) activity may falsely elevate enzymatically determined ethanol levels. Not for legal purposes.    SALICYLATE LEVEL - Normal   MAGNESIUM - Normal   PHOSPHORUS - Normal   HEPATITIS PANEL, ACUTE - Normal    Narrative:     Results may be falsely decreased if patient taking Biotin.    FENTANYL, URINE - Normal    Narrative:     Negative Threshold:      Fentanyl 5 ng/mL     The normal value for the drug tested is negative. This report includes final unconfirmed screening results to be used for medical treatment purposes only. Unconfirmed results must not be used for non-medical purposes such as employment or legal testing. Clinical consideration should be applied to any drug of abuse test, particularly when unconfirmed results are used.          COVID PRE-OP / PRE-PROCEDURE SCREENING ORDER (NO ISOLATION)    Narrative:     The following orders were created for panel order COVID PRE-OP / PRE-PROCEDURE SCREENING ORDER (NO ISOLATION) - Swab, Nasopharynx.  Procedure                               Abnormality         Status                     ---------                               -----------         ------                     COVID-19, FLU A/B, RSV P...[[id number]]  Normal              Final result                 Please view results for these tests on the individual orders.   RAINBOW DRAW    Narrative:     The following orders were created for panel order [place] Draw.  Procedure                                Abnormality         Status                     ---------                               -----------         ------                     Green Top (Gel)[[id number]]                                  Final result               Lavender Top[[id number]]                                     Final result               Gold Top - SST[[id number]]                                   Final result               Sifuentes Top[[id number]]                                         Final result               Light Blue Top[[id number]]                                   Final result                 Please view results for these tests on the individual orders.   URINALYSIS, MICROSCOPIC ONLY   CBC AND DIFFERENTIAL    Narrative:     The following orders were created for panel order CBC & Differential.  Procedure                               Abnormality         Status                     ---------                               -----------         ------                     CBC Auto Differential[[id number]]        Abnormal            Final result                 Please view results for these tests on the individual orders.   GREEN TOP   LAVENDER TOP   GOLD TOP - SST   GRAY TOP   LIGHT BLUE TOP       Meds Given in ED:   Medications   Sodium Chloride (PF) 0.9 % 10 mL (has no administration in time range)   lactated ringers infusion (125 mL/hr Intravenous New Bag 4/30/24 1317)   HYDROmorphone (DILAUDID) injection 0.5 mg (has no administration in time range)     And   naloxone (NARCAN) injection 0.4 mg (has no administration in time range)   sodium chloride 0.9 % bolus 1,000 mL (0 mL Intravenous Stopped 4/30/24 1308)   ondansetron (ZOFRAN) injection 4 mg (4 mg Intravenous Given 4/30/24 1132)   ketorolac (TORADOL) injection 15 mg (15 mg Intravenous Given 4/30/24 1133)   dicyclomine (BENTYL) injection 20 mg (20 mg Intramuscular Given 4/30/24 1134)   iopamidol (ISOVUE-300) 61 % injection 83 mL (83 mL Intravenous Given 4/30/24 1246)     lactated  ringers, 125 mL/hr, Last Rate: 125 mL/hr (04/30/24 1317)         Last NIH score:                                                          Dysphagia screening results:        Rickey Coma Scale:  No data recorded     CIWA:        Restraint Type:            Isolation Status:  No active isolations

## 2024-04-30 NOTE — PLAN OF CARE
Problem: Adult Inpatient Plan of Care  Goal: Plan of Care Review  Outcome: Ongoing, Progressing  Flowsheets (Taken 4/30/2024 1838)  Progress: no change  Plan of Care Reviewed With: patient  Goal: Patient-Specific Goal (Individualized)  Outcome: Ongoing, Progressing  Goal: Absence of Hospital-Acquired Illness or Injury  Outcome: Ongoing, Progressing  Intervention: Identify and Manage Fall Risk  Recent Flowsheet Documentation  Taken 4/30/2024 1543 by Jaron Lazaro RN  Safety Promotion/Fall Prevention:   activity supervised   assistive device/personal items within reach  Intervention: Prevent Skin Injury  Recent Flowsheet Documentation  Taken 4/30/2024 1543 by Jaron Lazaro RN  Body Position: position changed independently  Skin Protection: adhesive use limited  Intervention: Prevent and Manage VTE (Venous Thromboembolism) Risk  Recent Flowsheet Documentation  Taken 4/30/2024 1543 by Jaron Lazaro RN  Activity Management: up ad berny  Range of Motion: active ROM (range of motion) encouraged  Intervention: Prevent Infection  Recent Flowsheet Documentation  Taken 4/30/2024 1543 by Jaron Lazaro RN  Infection Prevention:   environmental surveillance performed   single patient room provided  Goal: Optimal Comfort and Wellbeing  Outcome: Ongoing, Progressing  Intervention: Provide Person-Centered Care  Recent Flowsheet Documentation  Taken 4/30/2024 1543 by Jaron Lazaro RN  Trust Relationship/Rapport:   emotional support provided   care explained  Goal: Readiness for Transition of Care  Outcome: Ongoing, Progressing  Intervention: Mutually Develop Transition Plan  Recent Flowsheet Documentation  Taken 4/30/2024 1833 by Jaron Lazaro RN  Transportation Anticipated: family or friend will provide  Patient/Family Anticipated Services at Transition: none  Patient/Family Anticipates Transition to: home with family  Taken 4/30/2024 1832 by Jaron Lazaro RN  Equipment Currently Used at Home: glucometer     Problem: Pain  Acute  Goal: Acceptable Pain Control and Functional Ability  Outcome: Ongoing, Progressing  Intervention: Prevent or Manage Pain  Recent Flowsheet Documentation  Taken 4/30/2024 1543 by Jaron Lazaro RN  Medication Review/Management: medications reviewed  Intervention: Optimize Psychosocial Wellbeing  Recent Flowsheet Documentation  Taken 4/30/2024 1543 by Jaron Lazaro RN  Diversional Activities: television     Problem: Adult Inpatient Plan of Care  Goal: Plan of Care Review  Outcome: Ongoing, Progressing  Flowsheets (Taken 4/30/2024 1838)  Progress: no change  Plan of Care Reviewed With: patient  Goal: Patient-Specific Goal (Individualized)  Outcome: Ongoing, Progressing  Goal: Absence of Hospital-Acquired Illness or Injury  Outcome: Ongoing, Progressing  Intervention: Identify and Manage Fall Risk  Recent Flowsheet Documentation  Taken 4/30/2024 1543 by Jaron Lazaro RN  Safety Promotion/Fall Prevention:   activity supervised   assistive device/personal items within reach  Intervention: Prevent Skin Injury  Recent Flowsheet Documentation  Taken 4/30/2024 1543 by Jaron Lazaro RN  Body Position: position changed independently  Skin Protection: adhesive use limited  Intervention: Prevent and Manage VTE (Venous Thromboembolism) Risk  Recent Flowsheet Documentation  Taken 4/30/2024 1543 by Jaron Lazaro RN  Activity Management: up ad berny  Range of Motion: active ROM (range of motion) encouraged  Intervention: Prevent Infection  Recent Flowsheet Documentation  Taken 4/30/2024 1543 by Jaron Lazaro RN  Infection Prevention:   environmental surveillance performed   single patient room provided  Goal: Optimal Comfort and Wellbeing  Outcome: Ongoing, Progressing  Intervention: Provide Person-Centered Care  Recent Flowsheet Documentation  Taken 4/30/2024 1543 by Jaron Lazaro RN  Trust Relationship/Rapport:   emotional support provided   care explained  Goal: Readiness for Transition of Care  Outcome: Ongoing,  Progressing  Intervention: Mutually Develop Transition Plan  Recent Flowsheet Documentation  Taken 4/30/2024 1833 by Jaron Lazaro RN  Transportation Anticipated: family or friend will provide  Patient/Family Anticipated Services at Transition: none  Patient/Family Anticipates Transition to: home with family  Taken 4/30/2024 1832 by Jaron Lazaro, RN  Equipment Currently Used at Home: glucometer     Problem: Pain Acute  Goal: Acceptable Pain Control and Functional Ability  Outcome: Ongoing, Progressing  Intervention: Prevent or Manage Pain  Recent Flowsheet Documentation  Taken 4/30/2024 1543 by Jaron Lazaro RN  Medication Review/Management: medications reviewed  Intervention: Optimize Psychosocial Wellbeing  Recent Flowsheet Documentation  Taken 4/30/2024 1543 by Jaron Lazaro, RN  Diversional Activities: television   Goal Outcome Evaluation:  Plan of Care Reviewed With: patient        Progress: no change

## 2024-05-01 LAB
ANION GAP SERPL CALCULATED.3IONS-SCNC: 7 MMOL/L (ref 5–15)
BUN SERPL-MCNC: 11 MG/DL (ref 6–20)
BUN/CREAT SERPL: 15.1 (ref 7–25)
CALCIUM SPEC-SCNC: 8.4 MG/DL (ref 8.6–10.5)
CHLORIDE SERPL-SCNC: 104 MMOL/L (ref 98–107)
CK SERPL-CCNC: 213 U/L (ref 20–200)
CO2 SERPL-SCNC: 26 MMOL/L (ref 22–29)
CREAT SERPL-MCNC: 0.73 MG/DL (ref 0.76–1.27)
DEPRECATED RDW RBC AUTO: 42.3 FL (ref 37–54)
EGFRCR SERPLBLD CKD-EPI 2021: 115.8 ML/MIN/1.73
ERYTHROCYTE [DISTWIDTH] IN BLOOD BY AUTOMATED COUNT: 12.7 % (ref 12.3–15.4)
GLUCOSE BLDC GLUCOMTR-MCNC: 169 MG/DL (ref 70–130)
GLUCOSE BLDC GLUCOMTR-MCNC: 173 MG/DL (ref 70–130)
GLUCOSE BLDC GLUCOMTR-MCNC: 229 MG/DL (ref 70–130)
GLUCOSE BLDC GLUCOMTR-MCNC: 243 MG/DL (ref 70–130)
GLUCOSE SERPL-MCNC: 250 MG/DL (ref 65–99)
HCT VFR BLD AUTO: 43.6 % (ref 37.5–51)
HGB BLD-MCNC: 14.2 G/DL (ref 13–17.7)
MAGNESIUM SERPL-MCNC: 1.6 MG/DL (ref 1.6–2.6)
MCH RBC QN AUTO: 29.5 PG (ref 26.6–33)
MCHC RBC AUTO-ENTMCNC: 32.6 G/DL (ref 31.5–35.7)
MCV RBC AUTO: 90.5 FL (ref 79–97)
PLATELET # BLD AUTO: 233 10*3/MM3 (ref 140–450)
PMV BLD AUTO: 11.4 FL (ref 6–12)
POTASSIUM SERPL-SCNC: 4.5 MMOL/L (ref 3.5–5.2)
RBC # BLD AUTO: 4.82 10*6/MM3 (ref 4.14–5.8)
SODIUM SERPL-SCNC: 137 MMOL/L (ref 136–145)
WBC NRBC COR # BLD AUTO: 5.26 10*3/MM3 (ref 3.4–10.8)

## 2024-05-01 PROCEDURE — 63710000001 INSULIN LISPRO (HUMAN) PER 5 UNITS: Performed by: INTERNAL MEDICINE

## 2024-05-01 PROCEDURE — 25810000003 SODIUM CHLORIDE 0.9 % SOLUTION: Performed by: INTERNAL MEDICINE

## 2024-05-01 PROCEDURE — 25010000002 HYDROMORPHONE PER 4 MG: Performed by: INTERNAL MEDICINE

## 2024-05-01 PROCEDURE — 25010000002 KETOROLAC TROMETHAMINE PER 15 MG: Performed by: INTERNAL MEDICINE

## 2024-05-01 PROCEDURE — 82550 ASSAY OF CK (CPK): CPT | Performed by: PEDIATRICS

## 2024-05-01 PROCEDURE — 99232 SBSQ HOSP IP/OBS MODERATE 35: CPT | Performed by: INTERNAL MEDICINE

## 2024-05-01 PROCEDURE — G0378 HOSPITAL OBSERVATION PER HR: HCPCS

## 2024-05-01 PROCEDURE — 63710000001 INSULIN GLARGINE PER 5 UNITS: Performed by: INTERNAL MEDICINE

## 2024-05-01 PROCEDURE — 82948 REAGENT STRIP/BLOOD GLUCOSE: CPT

## 2024-05-01 PROCEDURE — 80048 BASIC METABOLIC PNL TOTAL CA: CPT | Performed by: INTERNAL MEDICINE

## 2024-05-01 PROCEDURE — 25010000002 ENOXAPARIN PER 10 MG: Performed by: INTERNAL MEDICINE

## 2024-05-01 PROCEDURE — 83735 ASSAY OF MAGNESIUM: CPT | Performed by: PEDIATRICS

## 2024-05-01 PROCEDURE — 85027 COMPLETE CBC AUTOMATED: CPT | Performed by: INTERNAL MEDICINE

## 2024-05-01 RX ORDER — CLONIDINE HYDROCHLORIDE 0.1 MG/1
0.1 TABLET ORAL 4 TIMES DAILY PRN
Status: DISCONTINUED | OUTPATIENT
Start: 2024-05-02 | End: 2024-05-01

## 2024-05-01 RX ORDER — CLONIDINE HYDROCHLORIDE 0.1 MG/1
0.1 TABLET ORAL 3 TIMES DAILY PRN
Status: DISCONTINUED | OUTPATIENT
Start: 2024-05-03 | End: 2024-05-01

## 2024-05-01 RX ORDER — CLONIDINE HYDROCHLORIDE 0.1 MG/1
0.1 TABLET ORAL ONCE AS NEEDED
Status: DISCONTINUED | OUTPATIENT
Start: 2024-05-05 | End: 2024-05-01

## 2024-05-01 RX ORDER — CLONIDINE HYDROCHLORIDE 0.1 MG/1
0.1 TABLET ORAL 4 TIMES DAILY PRN
Status: DISCONTINUED | OUTPATIENT
Start: 2024-05-01 | End: 2024-05-01

## 2024-05-01 RX ORDER — KETOROLAC TROMETHAMINE 15 MG/ML
15 INJECTION, SOLUTION INTRAMUSCULAR; INTRAVENOUS EVERY 6 HOURS PRN
Status: DISPENSED | OUTPATIENT
Start: 2024-05-01 | End: 2024-05-03

## 2024-05-01 RX ORDER — CLONIDINE HYDROCHLORIDE 0.1 MG/1
0.1 TABLET ORAL 2 TIMES DAILY PRN
Status: DISCONTINUED | OUTPATIENT
Start: 2024-05-04 | End: 2024-05-01

## 2024-05-01 RX ADMIN — HYDROCODONE BITARTRATE AND ACETAMINOPHEN 1 TABLET: 5; 325 TABLET ORAL at 20:11

## 2024-05-01 RX ADMIN — Medication 10 ML: at 08:39

## 2024-05-01 RX ADMIN — HYDROMORPHONE HYDROCHLORIDE 0.5 MG: 1 INJECTION, SOLUTION INTRAMUSCULAR; INTRAVENOUS; SUBCUTANEOUS at 14:56

## 2024-05-01 RX ADMIN — HYDROMORPHONE HYDROCHLORIDE 0.5 MG: 1 INJECTION, SOLUTION INTRAMUSCULAR; INTRAVENOUS; SUBCUTANEOUS at 21:39

## 2024-05-01 RX ADMIN — INSULIN LISPRO 4 UNITS: 100 INJECTION, SOLUTION INTRAVENOUS; SUBCUTANEOUS at 16:55

## 2024-05-01 RX ADMIN — Medication 10 ML: at 21:25

## 2024-05-01 RX ADMIN — HYDROMORPHONE HYDROCHLORIDE 0.5 MG: 1 INJECTION, SOLUTION INTRAMUSCULAR; INTRAVENOUS; SUBCUTANEOUS at 06:48

## 2024-05-01 RX ADMIN — PANTOPRAZOLE SODIUM 40 MG: 40 TABLET, DELAYED RELEASE ORAL at 16:55

## 2024-05-01 RX ADMIN — INSULIN LISPRO 2 UNITS: 100 INJECTION, SOLUTION INTRAVENOUS; SUBCUTANEOUS at 12:17

## 2024-05-01 RX ADMIN — SODIUM CHLORIDE 100 ML/HR: 9 INJECTION, SOLUTION INTRAVENOUS at 07:19

## 2024-05-01 RX ADMIN — ATORVASTATIN CALCIUM 10 MG: 10 TABLET, FILM COATED ORAL at 21:25

## 2024-05-01 RX ADMIN — INSULIN GLARGINE 5 UNITS: 100 INJECTION, SOLUTION SUBCUTANEOUS at 21:25

## 2024-05-01 RX ADMIN — KETOROLAC TROMETHAMINE 15 MG: 15 INJECTION, SOLUTION INTRAMUSCULAR; INTRAVENOUS at 12:17

## 2024-05-01 RX ADMIN — INSULIN LISPRO 2 UNITS: 100 INJECTION, SOLUTION INTRAVENOUS; SUBCUTANEOUS at 21:25

## 2024-05-01 RX ADMIN — PANTOPRAZOLE SODIUM 40 MG: 40 TABLET, DELAYED RELEASE ORAL at 08:39

## 2024-05-01 RX ADMIN — FENOFIBRATE 145 MG: 145 TABLET ORAL at 09:55

## 2024-05-01 RX ADMIN — ENOXAPARIN SODIUM 40 MG: 100 INJECTION SUBCUTANEOUS at 08:38

## 2024-05-01 RX ADMIN — INSULIN LISPRO 4 UNITS: 100 INJECTION, SOLUTION INTRAVENOUS; SUBCUTANEOUS at 08:38

## 2024-05-01 NOTE — CASE MANAGEMENT/SOCIAL WORK
Continued Stay Note  [facility]     Patient Name: Yg Araujo  MRN: [id number]  Today's Date: 5/1/2024    Admit Date: 4/30/2024    Plan: Home at DC   Discharge Plan       Row Name 05/01/24 1051       Plan    Plan Home at DC    Plan Comments No DC needs identified at this time.    Final Discharge Disposition Code 01 - home or self-care      Row Name 05/01/24 0835       Plan    Final Discharge Disposition Code 01 - home or self-care                   Discharge Codes    No documentation.                 Expected Discharge Date and Time       Expected Discharge Date Expected Discharge Time    May 3, 2024               Melania Brown RN

## 2024-05-01 NOTE — PLAN OF CARE
Problem: Adult Inpatient Plan of Care  Goal: Plan of Care Review  Outcome: Ongoing, Progressing  Goal: Patient-Specific Goal (Individualized)  Outcome: Ongoing, Progressing  Goal: Absence of Hospital-Acquired Illness or Injury  Outcome: Ongoing, Progressing  Intervention: Identify and Manage Fall Risk  Recent Flowsheet Documentation  Taken 5/1/2024 1626 by Lorraine Thomas RN  Safety Promotion/Fall Prevention: safety round/check completed  Taken 5/1/2024 1423 by Lorraine Thomas RN  Safety Promotion/Fall Prevention:   activity supervised   safety round/check completed  Taken 5/1/2024 1210 by Lorraine Thomas RN  Safety Promotion/Fall Prevention:   activity supervised   safety round/check completed  Taken 5/1/2024 1010 by Lorraine Thomas RN  Safety Promotion/Fall Prevention:   safety round/check completed   activity supervised  Taken 5/1/2024 0833 by Lorraine Thomas RN  Safety Promotion/Fall Prevention:   activity supervised   safety round/check completed  Intervention: Prevent Skin Injury  Recent Flowsheet Documentation  Taken 5/1/2024 1626 by Lorraine Thomas RN  Body Position: position changed independently  Skin Protection:   adhesive use limited   transparent dressing maintained   tubing/devices free from skin contact  Taken 5/1/2024 1423 by Lorraine Thomas RN  Body Position: position changed independently  Skin Protection:   adhesive use limited   tubing/devices free from skin contact   transparent dressing maintained  Taken 5/1/2024 1210 by Lorraine Thomas RN  Body Position: position changed independently  Skin Protection:   adhesive use limited   tubing/devices free from skin contact   transparent dressing maintained  Taken 5/1/2024 1010 by Lorraine Thomas RN  Body Position: position changed independently  Skin Protection:   adhesive use limited   transparent dressing maintained   tubing/devices free from skin contact  Taken 5/1/2024 0833 by Lorraine Thomas RN  Body Position: position changed  independently  Skin Protection:   adhesive use limited   transparent dressing maintained   tubing/devices free from skin contact  Intervention: Prevent and Manage VTE (Venous Thromboembolism) Risk  Recent Flowsheet Documentation  Taken 5/1/2024 1626 by Lorraine Thomas RN  Activity Management: up ad berny  Taken 5/1/2024 1423 by Lorraine Thomas RN  Activity Management: up ad berny  Taken 5/1/2024 1210 by Lorriane Thomas RN  Activity Management: up ad berny  Taken 5/1/2024 1010 by Lorraine Thomas RN  Activity Management: activity encouraged  Taken 5/1/2024 0833 by Lorraine Thomas RN  Activity Management: activity encouraged  Range of Motion: active ROM (range of motion) encouraged  Intervention: Prevent Infection  Recent Flowsheet Documentation  Taken 5/1/2024 1626 by Lorraine Thomas RN  Infection Prevention:   hand hygiene promoted   single patient room provided  Taken 5/1/2024 1423 by Lorraine Thomas RN  Infection Prevention:   hand hygiene promoted   single patient room provided  Taken 5/1/2024 1210 by Lorraine Thomas RN  Infection Prevention:   hand hygiene promoted   environmental surveillance performed   single patient room provided  Taken 5/1/2024 1010 by Lorraine Thomas RN  Infection Prevention:   environmental surveillance performed   hand hygiene promoted   single patient room provided  Taken 5/1/2024 0833 by Lorraine Thomas RN  Infection Prevention:   hand hygiene promoted   single patient room provided  Goal: Optimal Comfort and Wellbeing  Outcome: Ongoing, Progressing  Intervention: Provide Person-Centered Care  Recent Flowsheet Documentation  Taken 5/1/2024 1626 by Lorraine Thomas RN  Trust Relationship/Rapport:   questions encouraged   questions answered   care explained  Taken 5/1/2024 1423 by Lorraine Thomas RN  Trust Relationship/Rapport:   care explained   questions encouraged   questions answered  Taken 5/1/2024 1210 by Lorraine Thomas RN  Trust Relationship/Rapport:   care explained    questions answered   questions encouraged  Taken 5/1/2024 1010 by Lorraine Thomas, RN  Trust Relationship/Rapport:   care explained   questions answered   questions encouraged  Taken 5/1/2024 0833 by Lorraine Thomas, RN  Trust Relationship/Rapport:   care explained   questions answered   questions encouraged  Goal: Readiness for Transition of Care  Outcome: Ongoing, Progressing   Goal Outcome Evaluation:            Pain management throughout shift, see MAR. Pt ambulating to bathroom/ shower this AM. No complaints at this time.

## 2024-05-01 NOTE — PROGRESS NOTES
[facility] Medicine Services  PROGRESS NOTE    Patient Name: Yg Araujo  : 1980  MRN: [id number]    Date of Admission: 2024  Primary Care Physician: Provider, No Known    Subjective   Subjective     CC:  Pancreatitis f/u    HPI:  Patient reports epigastric pain, has interest in drinking/eating today. We discuss slow escalation  Admits to illicit benzo and cocaine use - reports benzo use was to try to help current sx without success.We discuss triggers to pancreatitis recurrence    Objective   Objective     Vital Signs:   Temp:  [97.7 °F (36.5 °C)-97.9 °F (36.6 °C)] 97.7 °F (36.5 °C)  Heart Rate:  [63-73] 71  Resp:  [18] 18  BP: (102-125)/(68-96) 108/68     Physical Exam:  Constitutional: No acute distress, awake, alert male lying in bed  HENT: NCAT, mucous membranes moist  Respiratory: Clear to auscultation bilaterally, respiratory effort normal   Cardiovascular: RRR, no murmurs, rubs, or gallops  Gastrointestinal: Soft, nondistended, tenderness in epigastric area  Musculoskeletal: Muscle tone within normal limits, no joint effusions appreciated  Psychiatric: Appropriate affect, cooperative  Neurologic: Alert and oriented, facial movements symmetric and spontaneous movement of all 4 extremities grossly equal bilaterally, speech clear  Skin: No rashes    Results Reviewed:  LAB RESULTS:      Lab 24  0540 24  1049   WBC 5.26 6.67   HEMOGLOBIN 14.2 16.2   HEMATOCRIT 43.6 48.4   PLATELETS 233 282   NEUTROS ABS  --  3.67   IMMATURE GRANS (ABS)  --  0.05   LYMPHS ABS  --  2.29   MONOS ABS  --  0.53   EOS ABS  --  0.09   MCV 90.5 86.1   CRP  --  0.83*   LACTATE  --  1.1         Lab 24  0540 24  1049   SODIUM 137 137   POTASSIUM 4.5 4.0   CHLORIDE 104 100   CO2 26.0 27.0   ANION GAP 7.0 10.0   BUN 11 11   CREATININE 0.73* 0.71*   EGFR 115.8 116.7   GLUCOSE 250* 254*   CALCIUM 8.4* 9.6   MAGNESIUM  --  1.7   PHOSPHORUS  --  2.8   HEMOGLOBIN A1C  --  10.10*          Lab 04/30/24  1049   TOTAL PROTEIN 7.4   ALBUMIN 4.3   GLOBULIN 3.1   ALT (SGPT) 15   AST (SGOT) 18   BILIRUBIN 1.1   ALK PHOS 74   LIPASE 264*         Lab 04/30/24  1049   HSTROP T 13         Lab 04/30/24  1049   CHOLESTEROL 321*   LDL CHOL 182*   HDL CHOL 38*   TRIGLYCERIDES 495*             Brief Urine Lab Results  (Last result in the past 365 days)        Color   Clarity   Blood   Leuk Est   Nitrite   Protein   CREAT   Urine HCG        04/30/24 1049 Dark Yellow   Clear   Negative   Negative   Negative   30 mg/dL (1+)                   Microbiology Results Abnormal       Procedure Component Value - Date/Time    COVID PRE-OP / PRE-PROCEDURE SCREENING ORDER (NO ISOLATION) - Swab, Nasopharynx [[id number]]  (Normal) Collected: 04/30/24 1136    Lab Status: Final result Specimen: Swab from Nasopharynx Updated: 04/30/24 1231    Narrative:      The following orders were created for panel order COVID PRE-OP / PRE-PROCEDURE SCREENING ORDER (NO ISOLATION) - Swab, Nasopharynx.  Procedure                               Abnormality         Status                     ---------                               -----------         ------                     COVID-19, FLU A/B, RSV P...[[id number]]  Normal              Final result                 Please view results for these tests on the individual orders.    COVID-19, FLU A/B, RSV PCR 1 HR TAT - Swab, Nasopharynx [[id number]]  (Normal) Collected: 04/30/24 1136    Lab Status: Final result Specimen: Swab from Nasopharynx Updated: 04/30/24 1231     COVID19 Not Detected     Influenza A PCR Not Detected     Influenza B PCR Not Detected     RSV, PCR Not Detected    Narrative:      Fact sheet for providers: https://www.fda.gov/media/399118/download    Fact sheet for patients: https://www.fda.gov/media/545131/download    Test performed by PCR.            CT Abdomen Pelvis With Contrast    Result Date: 4/30/2024  CT ABDOMEN PELVIS W CONTRAST Date of Exam: 4/30/2024 12:37 PM EDT  Indication: Severe epigastric pain with nausea and vomiting. Comparison: 11/24/2023 Technique: Axial CT images were obtained of the abdomen and pelvis following the uneventful intravenous administration of 83 mL Isovue-300. Reconstructed coronal and sagittal images were also obtained. Automated exposure control and iterative construction methods were used. Findings: There is mild edema with fat stranding around the pancreatic body and proximal tail. There is no evidence of a focal fluid collection. There is no pancreatic ductal dilatation. The pancreas is overall normal in size. There are no liver lesions. The gallbladder and biliary tree are nondilated. The adrenal glands and spleen appear normal. There is no renal lesion or hydronephrosis. The bladder is incompletely distended. The prostate gland is normal in size. There is diverticulosis without findings of  acute diverticulitis. There is a normal appendix. There is no bowel wall thickening.     Impression: Impression: Findings are compatible with acute pancreatitis. No evidence of ductal dilatation, necrosis, or abscess formation. Electronically Signed: Sharon Nguyen MD  4/30/2024 1:00 PM EDT  Workstation ID: [id number]         Current medications:  Scheduled Meds:atorvastatin, 10 mg, Oral, Nightly  enoxaparin, 40 mg, Subcutaneous, Daily  fenofibrate, 145 mg, Oral, Daily  insulin glargine, 5 Units, Subcutaneous, Nightly  insulin lispro, 2-9 Units, Subcutaneous, 4x Daily AC & at Bedtime  nicotine, 1 patch, Transdermal, Q24H  pantoprazole, 40 mg, Oral, BID AC  sodium chloride, 10 mL, Intravenous, Q12H      Continuous Infusions:lactated ringers, 125 mL/hr, Last Rate: 125 mL/hr (04/30/24 0851)  sodium chloride, 100 mL/hr, Last Rate: 100 mL/hr (05/01/24 7219)      PRN Meds:.  acetaminophen **OR** acetaminophen **OR** acetaminophen    senna-docusate sodium **AND** polyethylene glycol **AND** bisacodyl **AND** bisacodyl    Calcium Replacement - Follow Nurse / BPA  Driven Protocol    dextrose    dextrose    glucagon (human recombinant)    HYDROcodone-acetaminophen    HYDROmorphone **AND** naloxone    ketorolac    Magnesium Standard Dose Replacement - Follow Nurse / BPA Driven Protocol    ondansetron ODT **OR** ondansetron    Phosphorus Replacement - Follow Nurse / BPA Driven Protocol    Potassium Replacement - Follow Nurse / BPA Driven Protocol    Sodium Chloride (PF)    sodium chloride    sodium chloride    Assessment & Plan   Assessment & Plan     Active Hospital Problems    Diagnosis  POA    **Pancreatitis [K85.90]  Yes    Polysubstance abuse [F19.10]  Yes    GERD without esophagitis [K21.9]  Yes    Type 2 diabetes mellitus [E11.9]  Yes    Hypertriglyceridemia [E78.1]  Yes      Resolved Hospital Problems   No resolved problems to display.        Brief Hospital Course to date:  Yg Araujo is a 44 y.o. male w h/o ETOH pancreatitis, hypertriglyceridemia, polysubstance use who presented with recurrent pancreatitis.    Recurrent acute pancreatitis  -patient denies continued ETOH use as trigger  -cocaine can trigger, patient also with hyperTG w levels ~500  -supportive care, liquid diet for now and advance as able  -d/w him importance of complete alcohol cessation (which he reports), continued use of fibrate as OP and cocaine cessation to prevent recurrence    Hypertriglyceridemia, mod/severe  -continue fenofibrate at dc given h/o pancreatitis  -nutrition consult for low TG diet    DMII, poorly controlled w hyperGlc and A1c >10  -patient diagnosed here on prior hospital stay and given metformin, not refilled  -start low dose insulin + SSI while inpatient  -will need to re-discuss with patient given worsening A1c and make sure established w PCP    Polysubstance use - discussed illicit benzo use and cocaine use and encouraged immediate cessation    Tobacco use - NRT    Expected Discharge Location and Transportation: home  Expected Discharge 5/2 v 5/3 based on  improvement  Expected Discharge Date: 5/3/2024; Expected Discharge Time:      DVT prophylaxis:  Medical DVT prophylaxis orders are present.         AM-PAC 6 Clicks Score (PT): 24 (05/01/24 0833)    CODE STATUS:   Code Status and Medical Interventions:   Ordered at: 04/30/24 1324     Code Status (Patient has no pulse and is not breathing):    CPR (Attempt to Resuscitate)     Medical Interventions (Patient has pulse or is breathing):    Full Support       Agustina Bryan MD  05/01/24

## 2024-05-02 LAB
GLUCOSE BLDC GLUCOMTR-MCNC: 147 MG/DL (ref 70–130)
GLUCOSE BLDC GLUCOMTR-MCNC: 151 MG/DL (ref 70–130)
GLUCOSE BLDC GLUCOMTR-MCNC: 156 MG/DL (ref 70–130)
GLUCOSE BLDC GLUCOMTR-MCNC: 240 MG/DL (ref 70–130)

## 2024-05-02 PROCEDURE — 82948 REAGENT STRIP/BLOOD GLUCOSE: CPT

## 2024-05-02 PROCEDURE — 63710000001 INSULIN LISPRO (HUMAN) PER 5 UNITS: Performed by: INTERNAL MEDICINE

## 2024-05-02 PROCEDURE — 25010000002 HYDROMORPHONE PER 4 MG: Performed by: INTERNAL MEDICINE

## 2024-05-02 PROCEDURE — 25010000002 ENOXAPARIN PER 10 MG: Performed by: INTERNAL MEDICINE

## 2024-05-02 PROCEDURE — 99232 SBSQ HOSP IP/OBS MODERATE 35: CPT | Performed by: INTERNAL MEDICINE

## 2024-05-02 PROCEDURE — 63710000001 INSULIN GLARGINE PER 5 UNITS: Performed by: INTERNAL MEDICINE

## 2024-05-02 PROCEDURE — 25810000003 SODIUM CHLORIDE 0.9 % SOLUTION: Performed by: INTERNAL MEDICINE

## 2024-05-02 RX ORDER — INSULIN LISPRO 100 [IU]/ML
2 INJECTION, SOLUTION INTRAVENOUS; SUBCUTANEOUS
Status: DISCONTINUED | OUTPATIENT
Start: 2024-05-02 | End: 2024-05-03 | Stop reason: HOSPADM

## 2024-05-02 RX ORDER — SIMETHICONE 40MG/0.6ML
40 SUSPENSION, DROPS(FINAL DOSAGE FORM)(ML) ORAL 4 TIMES DAILY PRN
Status: DISCONTINUED | OUTPATIENT
Start: 2024-05-02 | End: 2024-05-03 | Stop reason: HOSPADM

## 2024-05-02 RX ORDER — HYDROCODONE BITARTRATE AND ACETAMINOPHEN 7.5; 325 MG/1; MG/1
1 TABLET ORAL EVERY 4 HOURS PRN
Status: DISCONTINUED | OUTPATIENT
Start: 2024-05-02 | End: 2024-05-03 | Stop reason: HOSPADM

## 2024-05-02 RX ADMIN — SODIUM CHLORIDE 125 ML/HR: 9 INJECTION, SOLUTION INTRAVENOUS at 21:44

## 2024-05-02 RX ADMIN — SODIUM CHLORIDE 125 ML/HR: 9 INJECTION, SOLUTION INTRAVENOUS at 14:22

## 2024-05-02 RX ADMIN — INSULIN LISPRO 2 UNITS: 100 INJECTION, SOLUTION INTRAVENOUS; SUBCUTANEOUS at 12:03

## 2024-05-02 RX ADMIN — HYDROCODONE BITARTRATE AND ACETAMINOPHEN 1 TABLET: 7.5; 325 TABLET ORAL at 21:40

## 2024-05-02 RX ADMIN — INSULIN LISPRO 2 UNITS: 100 INJECTION, SOLUTION INTRAVENOUS; SUBCUTANEOUS at 08:33

## 2024-05-02 RX ADMIN — FENOFIBRATE 145 MG: 145 TABLET ORAL at 08:32

## 2024-05-02 RX ADMIN — HYDROMORPHONE HYDROCHLORIDE 0.5 MG: 1 INJECTION, SOLUTION INTRAMUSCULAR; INTRAVENOUS; SUBCUTANEOUS at 10:28

## 2024-05-02 RX ADMIN — INSULIN LISPRO 2 UNITS: 100 INJECTION, SOLUTION INTRAVENOUS; SUBCUTANEOUS at 12:04

## 2024-05-02 RX ADMIN — ENOXAPARIN SODIUM 40 MG: 100 INJECTION SUBCUTANEOUS at 08:31

## 2024-05-02 RX ADMIN — INSULIN GLARGINE 5 UNITS: 100 INJECTION, SOLUTION SUBCUTANEOUS at 21:40

## 2024-05-02 RX ADMIN — SODIUM CHLORIDE 125 ML/HR: 9 INJECTION, SOLUTION INTRAVENOUS at 05:10

## 2024-05-02 RX ADMIN — Medication 10 ML: at 21:44

## 2024-05-02 RX ADMIN — PANTOPRAZOLE SODIUM 40 MG: 40 TABLET, DELAYED RELEASE ORAL at 08:31

## 2024-05-02 RX ADMIN — ATORVASTATIN CALCIUM 10 MG: 10 TABLET, FILM COATED ORAL at 21:40

## 2024-05-02 RX ADMIN — SIMETHICONE 40 MG: 20 SUSPENSION ORAL at 22:08

## 2024-05-02 RX ADMIN — HYDROMORPHONE HYDROCHLORIDE 0.5 MG: 1 INJECTION, SOLUTION INTRAMUSCULAR; INTRAVENOUS; SUBCUTANEOUS at 05:11

## 2024-05-02 RX ADMIN — INSULIN LISPRO 2 UNITS: 100 INJECTION, SOLUTION INTRAVENOUS; SUBCUTANEOUS at 18:24

## 2024-05-02 RX ADMIN — INSULIN LISPRO 4 UNITS: 100 INJECTION, SOLUTION INTRAVENOUS; SUBCUTANEOUS at 08:31

## 2024-05-02 RX ADMIN — HYDROCODONE BITARTRATE AND ACETAMINOPHEN 1 TABLET: 7.5; 325 TABLET ORAL at 16:22

## 2024-05-02 RX ADMIN — Medication 10 ML: at 08:32

## 2024-05-02 RX ADMIN — PANTOPRAZOLE SODIUM 40 MG: 40 TABLET, DELAYED RELEASE ORAL at 16:35

## 2024-05-02 NOTE — PLAN OF CARE
Problem: Adult Inpatient Plan of Care  Goal: Plan of Care Review  Outcome: Ongoing, Progressing  Goal: Patient-Specific Goal (Individualized)  Outcome: Ongoing, Progressing  Goal: Absence of Hospital-Acquired Illness or Injury  Outcome: Ongoing, Progressing  Intervention: Identify and Manage Fall Risk  Recent Flowsheet Documentation  Taken 5/2/2024 1610 by Lorraine Thomas RN  Safety Promotion/Fall Prevention:   activity supervised   safety round/check completed  Taken 5/2/2024 1415 by Lorraine Thomas RN  Safety Promotion/Fall Prevention: activity supervised  Taken 5/2/2024 1220 by Lorraine Thomas RN  Safety Promotion/Fall Prevention:   activity supervised   safety round/check completed  Taken 5/2/2024 1028 by Lorraine Thomas RN  Safety Promotion/Fall Prevention:   activity supervised   safety round/check completed  Taken 5/2/2024 0815 by Lorraine Thomas RN  Safety Promotion/Fall Prevention:   activity supervised   safety round/check completed  Intervention: Prevent Skin Injury  Recent Flowsheet Documentation  Taken 5/2/2024 1610 by Lorraine Thomas RN  Body Position: position changed independently  Skin Protection:   adhesive use limited   tubing/devices free from skin contact   transparent dressing maintained  Taken 5/2/2024 1415 by Lorraine Thomas RN  Body Position: position changed independently  Skin Protection:   adhesive use limited   transparent dressing maintained   tubing/devices free from skin contact  Taken 5/2/2024 1220 by Lorraine Thomas RN  Body Position: position changed independently  Skin Protection:   adhesive use limited   transparent dressing maintained   tubing/devices free from skin contact  Taken 5/2/2024 1028 by Lorraine Thomas RN  Body Position: position changed independently  Skin Protection:   adhesive use limited   transparent dressing maintained   tubing/devices free from skin contact  Taken 5/2/2024 0815 by Lorraine Thomas RN  Body Position: position changed  independently  Skin Protection:   adhesive use limited   tubing/devices free from skin contact   transparent dressing maintained  Intervention: Prevent and Manage VTE (Venous Thromboembolism) Risk  Recent Flowsheet Documentation  Taken 5/2/2024 1610 by Lorraine Thomas RN  Activity Management: up ad berny  Taken 5/2/2024 1415 by Lorraine Thomas RN  Activity Management: up ad berny  Taken 5/2/2024 1220 by Lorraine Thomas RN  Activity Management: up ad berny  Taken 5/2/2024 1028 by Lorraine Thomas RN  Activity Management: up ad berny  Taken 5/2/2024 0815 by Lorraine Thomas RN  Activity Management: up ad berny  Range of Motion: active ROM (range of motion) encouraged  Intervention: Prevent Infection  Recent Flowsheet Documentation  Taken 5/2/2024 1610 by Lorraine Thomas RN  Infection Prevention: single patient room provided  Taken 5/2/2024 1415 by Lorraine Thomas RN  Infection Prevention:   single patient room provided   hand hygiene promoted  Taken 5/2/2024 1220 by Lorraine Thomas RN  Infection Prevention:   personal protective equipment utilized   single patient room provided  Taken 5/2/2024 1028 by Lorraine Thomas RN  Infection Prevention: single patient room provided  Taken 5/2/2024 0815 by Lorraine Thmoas RN  Infection Prevention:   hand hygiene promoted   single patient room provided  Goal: Optimal Comfort and Wellbeing  Outcome: Ongoing, Progressing  Intervention: Monitor Pain and Promote Comfort  Recent Flowsheet Documentation  Taken 5/2/2024 1028 by Lorraine Thomas RN  Pain Management Interventions: see MAR  Intervention: Provide Person-Centered Care  Recent Flowsheet Documentation  Taken 5/2/2024 1610 by Lorraine Thomas RN  Trust Relationship/Rapport:   questions answered   questions encouraged   care explained  Taken 5/2/2024 1415 by Lorraine Thomas RN  Trust Relationship/Rapport:   care explained   questions answered   questions encouraged  Taken 5/2/2024 1220 by Lorraine Thomas RN  Trust  Relationship/Rapport:   care explained   questions encouraged   questions answered  Taken 5/2/2024 1028 by Lorraine Thomas, RN  Trust Relationship/Rapport:   care explained   questions answered   questions encouraged  Taken 5/2/2024 0815 by Lorarine Thomas, RN  Trust Relationship/Rapport:   care explained   questions answered   questions encouraged  Goal: Readiness for Transition of Care  Outcome: Ongoing, Progressing   Goal Outcome Evaluation:               Advanced diet today and tolerated well. Pt up ad berny in room.

## 2024-05-02 NOTE — PLAN OF CARE
Goal Outcome Evaluation:      Patient had no acute events during shift  Pain control - see MAR  RA, ALIDA, A&Ox4

## 2024-05-02 NOTE — CONSULTS
Clinical Nutrition     Nutrition Education   Reason for Visit:   Physician Consult       Patient Name: Yg Araujo  YOB: 1980  MRN: [id number]  Date of Encounter: 05/01/24 20:54 EDT  Admission date: 4/30/2024      Applicable Diagnoses     Pancreatitis, DM     Diet/Nutrition Related History:     Pt allows has had DM ed but agreeable to review and to direction re low fat diet.      Labs reviewed   Yes    Nutrition Diagnosis   5/1  Problem Food and nutrition knowledge deficit   Related To Pancreatitis DM   Signs/Symptoms reported   Status: pt w materials to follow  Goal:     Increase knowledge on diet/nutrition effects on condition/status     Nutrition Intervention     Education provided regarding nutrition therapy for: Diet rationale, Key food habit change, Consistent Carbohydrate Diet, and Other low fat for pancreatitis high TRIG    Education provided regarding food habits/behavior related to:Food choices, Eating pattern, Appropriate portions, and Label reading     RD provided printed material via zoidu RDN created education material      Monitoring/Evaluation:     Pt acknowledged understanding of material covered      Violet Rodgers RD  Time Spent: 30 min

## 2024-05-02 NOTE — PROGRESS NOTES
[facility] Medicine Services  PROGRESS NOTE    Patient Name: Yg Araujo  : 1980  MRN: [id number]    Date of Admission: 2024  Primary Care Physician: Provider, No Known    Subjective   Subjective     CC:  Pancreatitis f/u    HPI:  Interested in food, pain much improved  Hopeful for home soon  We discuss diabetes diagnosis and need for close follow-up, last saw PCP 3/2022 x 1 (post hospital stay) and has been off metformin thereafter    Objective   Objective     Vital Signs:   Temp:  [97.9 °F (36.6 °C)-98.2 °F (36.8 °C)] 98.2 °F (36.8 °C)  Heart Rate:  [59-71] 66  Resp:  [16-18] 18  BP: (114-141)/(85-94) 139/92     Physical Exam:  Constitutional: No acute distress, awake, alert male lying in bed  HENT: NCAT, mucous membranes moist  Respiratory: Clear to auscultation bilaterally, respiratory effort normal   Cardiovascular: RRR, no murmurs, rubs, or gallops  Gastrointestinal: Soft, nondistended, no significant epigastric tenderness  Psychiatric: Appropriate affect, cooperative  Neurologic: Alert and oriented, facial movements symmetric and spontaneous movement of all 4 extremities grossly equal bilaterally, speech clear  Skin: No rashes    Results Reviewed:  LAB RESULTS:      Lab 24  0540 24  1049   WBC 5.26 6.67   HEMOGLOBIN 14.2 16.2   HEMATOCRIT 43.6 48.4   PLATELETS 233 282   NEUTROS ABS  --  3.67   IMMATURE GRANS (ABS)  --  0.05   LYMPHS ABS  --  2.29   MONOS ABS  --  0.53   EOS ABS  --  0.09   MCV 90.5 86.1   CRP  --  0.83*   LACTATE  --  1.1         Lab 24  0540 24  1049   SODIUM 137 137   POTASSIUM 4.5 4.0   CHLORIDE 104 100   CO2 26.0 27.0   ANION GAP 7.0 10.0   BUN 11 11   CREATININE 0.73* 0.71*   EGFR 115.8 116.7   GLUCOSE 250* 254*   CALCIUM 8.4* 9.6   MAGNESIUM 1.6 1.7   PHOSPHORUS  --  2.8   HEMOGLOBIN A1C  --  10.10*         Lab 24  1049   TOTAL PROTEIN 7.4   ALBUMIN 4.3   GLOBULIN 3.1   ALT (SGPT) 15   AST (SGOT) 18   BILIRUBIN  1.1   ALK PHOS 74   LIPASE 264*         Lab 04/30/24  1049   HSTROP T 13         Lab 04/30/24  1049   CHOLESTEROL 321*   LDL CHOL 182*   HDL CHOL 38*   TRIGLYCERIDES 495*             Brief Urine Lab Results  (Last result in the past 365 days)        Color   Clarity   Blood   Leuk Est   Nitrite   Protein   CREAT   Urine HCG        04/30/24 1049 Dark Yellow   Clear   Negative   Negative   Negative   30 mg/dL (1+)                   Microbiology Results Abnormal       Procedure Component Value - Date/Time    COVID PRE-OP / PRE-PROCEDURE SCREENING ORDER (NO ISOLATION) - Swab, Nasopharynx [[id number]]  (Normal) Collected: 04/30/24 1136    Lab Status: Final result Specimen: Swab from Nasopharynx Updated: 04/30/24 1231    Narrative:      The following orders were created for panel order COVID PRE-OP / PRE-PROCEDURE SCREENING ORDER (NO ISOLATION) - Swab, Nasopharynx.  Procedure                               Abnormality         Status                     ---------                               -----------         ------                     COVID-19, FLU A/B, RSV P...[[id number]]  Normal              Final result                 Please view results for these tests on the individual orders.    COVID-19, FLU A/B, RSV PCR 1 HR TAT - Swab, Nasopharynx [[id number]]  (Normal) Collected: 04/30/24 1136    Lab Status: Final result Specimen: Swab from Nasopharynx Updated: 04/30/24 1231     COVID19 Not Detected     Influenza A PCR Not Detected     Influenza B PCR Not Detected     RSV, PCR Not Detected    Narrative:      Fact sheet for providers: https://www.fda.gov/media/785344/download    Fact sheet for patients: https://www.fda.gov/media/779828/download    Test performed by PCR.            No radiology results from the last 24 hrs        Current medications:  Scheduled Meds:atorvastatin, 10 mg, Oral, Nightly  enoxaparin, 40 mg, Subcutaneous, Daily  fenofibrate, 145 mg, Oral, Daily  insulin glargine, 10 Units, Subcutaneous,  Nightly  Insulin Lispro, 2 Units, Subcutaneous, TID With Meals  insulin lispro, 2-9 Units, Subcutaneous, 4x Daily AC & at Bedtime  nicotine, 1 patch, Transdermal, Q24H  pantoprazole, 40 mg, Oral, BID AC  sodium chloride, 10 mL, Intravenous, Q12H      Continuous Infusions:sodium chloride, 125 mL/hr, Last Rate: 125 mL/hr (05/02/24 0510)      PRN Meds:.  acetaminophen **OR** acetaminophen **OR** acetaminophen    senna-docusate sodium **AND** polyethylene glycol **AND** bisacodyl **AND** bisacodyl    Calcium Replacement - Follow Nurse / BPA Driven Protocol    dextrose    dextrose    glucagon (human recombinant)    HYDROcodone-acetaminophen    ketorolac    Magnesium Standard Dose Replacement - Follow Nurse / BPA Driven Protocol    [DISCONTINUED] HYDROmorphone **AND** naloxone    ondansetron ODT **OR** ondansetron    Phosphorus Replacement - Follow Nurse / BPA Driven Protocol    Potassium Replacement - Follow Nurse / BPA Driven Protocol    Sodium Chloride (PF)    sodium chloride    sodium chloride    Assessment & Plan   Assessment & Plan     Active Hospital Problems    Diagnosis  POA    **Pancreatitis [K85.90]  Yes    Polysubstance abuse [F19.10]  Yes    GERD without esophagitis [K21.9]  Yes    Type 2 diabetes mellitus [E11.9]  Yes    Hypertriglyceridemia [E78.1]  Yes      Resolved Hospital Problems   No resolved problems to display.        Brief Hospital Course to date:  Yg Araujo is a 44 y.o. male w h/o ETOH pancreatitis, hypertriglyceridemia, polysubstance use who presented with recurrent pancreatitis.    Recurrent acute pancreatitis  -patient denies continued ETOH use as trigger  -cocaine can trigger, patient also with hyperTG w levels ~500  -trial of low fat diet  -d/w him importance of complete alcohol cessation (which he reports), continued use of fibrate as OP and cocaine cessation to prevent recurrence    Hypertriglyceridemia, mod/severe  -continue fenofibrate at OR given h/o pancreatitis  -nutrition  consult for low TG diet    DMII, poorly controlled w hyperGlc and A1c >10  -patient diagnosed here on prior hospital stay and given metformin, not refilled thereafter, lost to PCP follow-up  -start low dose insulin + SSI while inpatient  -counseled patient on importance of OP f/u to prevent long-term insulin use, amenable to metformin at discharge + PCP close follow-up for further escalation    Polysubstance use - discussed illicit benzo use and cocaine use and encouraged immediate cessation    Tobacco use - NRT    Expected Discharge Location and Transportation: home  Expected Discharge 5/3 based on improvement  Expected Discharge Date: 5/3/2024; Expected Discharge Time:      DVT prophylaxis:  Medical DVT prophylaxis orders are present.         AM-PAC 6 Clicks Score (PT): 24 (05/01/24 2013)    CODE STATUS:   Code Status and Medical Interventions:   Ordered at: 04/30/24 1324     Code Status (Patient has no pulse and is not breathing):    CPR (Attempt to Resuscitate)     Medical Interventions (Patient has pulse or is breathing):    Full Support       Agustina Bryan MD  05/02/24

## 2024-05-03 ENCOUNTER — READMISSION MANAGEMENT (OUTPATIENT)
Dept: CALL CENTER | Facility: HOSPITAL | Age: 44
End: 2024-05-03
Payer: MEDICAID

## 2024-05-03 VITALS
BODY MASS INDEX: 29.55 KG/M2 | RESPIRATION RATE: 16 BRPM | HEIGHT: 68 IN | TEMPERATURE: 98.4 F | OXYGEN SATURATION: 98 % | SYSTOLIC BLOOD PRESSURE: 141 MMHG | DIASTOLIC BLOOD PRESSURE: 98 MMHG | HEART RATE: 69 BPM | WEIGHT: 195 LBS

## 2024-05-03 PROBLEM — K85.90 PANCREATITIS: Status: RESOLVED | Noted: 2024-04-30 | Resolved: 2024-05-03

## 2024-05-03 PROBLEM — K85.90 ACUTE PANCREATITIS: Status: RESOLVED | Noted: 2023-11-25 | Resolved: 2024-05-03

## 2024-05-03 LAB
GLUCOSE BLDC GLUCOMTR-MCNC: 155 MG/DL (ref 70–130)
GLUCOSE BLDC GLUCOMTR-MCNC: 160 MG/DL (ref 70–130)

## 2024-05-03 PROCEDURE — 82948 REAGENT STRIP/BLOOD GLUCOSE: CPT

## 2024-05-03 PROCEDURE — 63710000001 INSULIN LISPRO (HUMAN) PER 5 UNITS: Performed by: INTERNAL MEDICINE

## 2024-05-03 PROCEDURE — 99239 HOSP IP/OBS DSCHRG MGMT >30: CPT | Performed by: INTERNAL MEDICINE

## 2024-05-03 PROCEDURE — 25010000002 ENOXAPARIN PER 10 MG: Performed by: INTERNAL MEDICINE

## 2024-05-03 PROCEDURE — 25810000003 SODIUM CHLORIDE 0.9 % SOLUTION: Performed by: INTERNAL MEDICINE

## 2024-05-03 RX ORDER — FENOFIBRATE 48 MG/1
48 TABLET, COATED ORAL DAILY
Qty: 30 TABLET | Refills: 0 | Status: SHIPPED | OUTPATIENT
Start: 2024-05-03 | End: 2024-06-02

## 2024-05-03 RX ORDER — METFORMIN HYDROCHLORIDE 500 MG/1
500 TABLET, EXTENDED RELEASE ORAL
Qty: 30 TABLET | Refills: 0 | Status: SHIPPED | OUTPATIENT
Start: 2024-05-03 | End: 2024-06-02

## 2024-05-03 RX ADMIN — SODIUM CHLORIDE 125 ML/HR: 9 INJECTION, SOLUTION INTRAVENOUS at 05:14

## 2024-05-03 RX ADMIN — INSULIN LISPRO 2 UNITS: 100 INJECTION, SOLUTION INTRAVENOUS; SUBCUTANEOUS at 08:16

## 2024-05-03 RX ADMIN — FENOFIBRATE 145 MG: 145 TABLET ORAL at 08:16

## 2024-05-03 RX ADMIN — PANTOPRAZOLE SODIUM 40 MG: 40 TABLET, DELAYED RELEASE ORAL at 08:16

## 2024-05-03 RX ADMIN — INSULIN LISPRO 2 UNITS: 100 INJECTION, SOLUTION INTRAVENOUS; SUBCUTANEOUS at 12:28

## 2024-05-03 RX ADMIN — ENOXAPARIN SODIUM 40 MG: 100 INJECTION SUBCUTANEOUS at 08:15

## 2024-05-03 RX ADMIN — Medication 10 ML: at 08:17

## 2024-05-03 RX ADMIN — HYDROCODONE BITARTRATE AND ACETAMINOPHEN 1 TABLET: 7.5; 325 TABLET ORAL at 05:15

## 2024-05-03 NOTE — DISCHARGE SUMMARY
[facility] Medicine Services  DISCHARGE SUMMARY    Patient Name: Yg Araujo  : 1980  MRN: [id number]    Date of Admission: 2024 11:15 AM  Date of Discharge:  5/3/2024  Primary Care Physician: Tang Rios MD    Consults       No orders found from 2024 to 2024.            Hospital Course     Presenting Problem: recurrent acute pancreatitis    Active Hospital Problems    Diagnosis  POA    Polysubstance abuse [F19.10]  Yes    GERD without esophagitis [K21.9]  Yes    Type 2 diabetes mellitus [E11.9]  Yes    Hypertriglyceridemia [E78.1]  Yes      Resolved Hospital Problems    Diagnosis Date Resolved POA    **Pancreatitis [K85.90] 2024 Yes    Acute pancreatitis [K85.90] 2024 Yes          Hospital Course:  Yg Araujo is a 44 y.o. male w h/o ETOH pancreatitis, hypertriglyceridemia, polysubstance use who presented with recurrent pancreatitis.  Reports alcohol cessation, UDS + cocaine, benzo (not prescribed to him). Encouraged immediate cocaine cessation given some association to pancreatitis. TG ~500, so encouraged resumption of prior fenofibrate. Sx improved with typical supportive care and tolerating low-fat diet pain free on 5/3 so found appropriate for discharge.  A1c >10, d/w patient need for OP follow-up. Restarted low dose metformin until close PCP follow-up    Discharge Follow Up Recommendations for outpatient labs/diagnostics:   F/u PCP 1 week for DM management    Day of Discharge     HPI:   Doing well today, tolerating diet well, ready for home      Vital Signs:   Temp:  [98.1 °F (36.7 °C)-98.4 °F (36.9 °C)] 98.4 °F (36.9 °C)  Heart Rate:  [61-69] 69  Resp:  [16-18] 16  BP: (126-163)/() 141/98      Physical Exam:  Constitutional: No acute distress, awake, alert  HENT: NCAT, mucous membranes moist  Respiratory: Clear to auscultation bilaterally, respiratory effort normal   Cardiovascular: RRR, no murmurs, rubs, or  gallops  Gastrointestinal: Soft,mild epigastric ttp, nondistended  Musculoskeletal: Muscle tone within normal limits, no joint effusions appreciated  Psychiatric: Appropriate affect, cooperative  Neurologic: Alert and oriented, facial movements symmetric and spontaneous movement of all 4 extremities grossly equal bilaterally, speech clear  Skin: No rashes    Pertinent  and/or Most Recent Results     LAB RESULTS:      Lab 05/01/24  0540 04/30/24  1049   WBC 5.26 6.67   HEMOGLOBIN 14.2 16.2   HEMATOCRIT 43.6 48.4   PLATELETS 233 282   NEUTROS ABS  --  3.67   IMMATURE GRANS (ABS)  --  0.05   LYMPHS ABS  --  2.29   MONOS ABS  --  0.53   EOS ABS  --  0.09   MCV 90.5 86.1   CRP  --  0.83*   LACTATE  --  1.1         Lab 05/01/24  0540 04/30/24  1049   SODIUM 137 137   POTASSIUM 4.5 4.0   CHLORIDE 104 100   CO2 26.0 27.0   ANION GAP 7.0 10.0   BUN 11 11   CREATININE 0.73* 0.71*   EGFR 115.8 116.7   GLUCOSE 250* 254*   CALCIUM 8.4* 9.6   MAGNESIUM 1.6 1.7   PHOSPHORUS  --  2.8   HEMOGLOBIN A1C  --  10.10*         Lab 04/30/24  1049   TOTAL PROTEIN 7.4   ALBUMIN 4.3   GLOBULIN 3.1   ALT (SGPT) 15   AST (SGOT) 18   BILIRUBIN 1.1   ALK PHOS 74   LIPASE 264*         Lab 04/30/24  1049   HSTROP T 13         Lab 04/30/24  1049   CHOLESTEROL 321*   LDL CHOL 182*   HDL CHOL 38*   TRIGLYCERIDES 495*             Brief Urine Lab Results  (Last result in the past 365 days)        Color   Clarity   Blood   Leuk Est   Nitrite   Protein   CREAT   Urine HCG        04/30/24 1049 Dark Yellow   Clear   Negative   Negative   Negative   30 mg/dL (1+)                 Microbiology Results (last 10 days)       Procedure Component Value - Date/Time    COVID PRE-OP / PRE-PROCEDURE SCREENING ORDER (NO ISOLATION) - Swab, Nasopharynx [[id number]]  (Normal) Collected: 04/30/24 1136    Lab Status: Final result Specimen: Swab from Nasopharynx Updated: 04/30/24 1231    Narrative:      The following orders were created for panel order COVID PRE-OP /  PRE-PROCEDURE SCREENING ORDER (NO ISOLATION) - Swab, Nasopharynx.  Procedure                               Abnormality         Status                     ---------                               -----------         ------                     COVID-19, FLU A/B, RSV P...[[id number]]  Normal              Final result                 Please view results for these tests on the individual orders.    COVID-19, FLU A/B, RSV PCR 1 HR TAT - Swab, Nasopharynx [[id number]]  (Normal) Collected: 04/30/24 1136    Lab Status: Final result Specimen: Swab from Nasopharynx Updated: 04/30/24 1231     COVID19 Not Detected     Influenza A PCR Not Detected     Influenza B PCR Not Detected     RSV, PCR Not Detected    Narrative:      Fact sheet for providers: https://www.fda.gov/media/444588/download    Fact sheet for patients: https://www.fda.gov/media/854041/download    Test performed by PCR.            CT Abdomen Pelvis With Contrast    Result Date: 4/30/2024  CT ABDOMEN PELVIS W CONTRAST Date of Exam: 4/30/2024 12:37 PM EDT Indication: Severe epigastric pain with nausea and vomiting. Comparison: 11/24/2023 Technique: Axial CT images were obtained of the abdomen and pelvis following the uneventful intravenous administration of 83 mL Isovue-300. Reconstructed coronal and sagittal images were also obtained. Automated exposure control and iterative construction methods were used. Findings: There is mild edema with fat stranding around the pancreatic body and proximal tail. There is no evidence of a focal fluid collection. There is no pancreatic ductal dilatation. The pancreas is overall normal in size. There are no liver lesions. The gallbladder and biliary tree are nondilated. The adrenal glands and spleen appear normal. There is no renal lesion or hydronephrosis. The bladder is incompletely distended. The prostate gland is normal in size. There is diverticulosis without findings of  acute diverticulitis. There is a normal appendix. There is  no bowel wall thickening.     Impression: Findings are compatible with acute pancreatitis. No evidence of ductal dilatation, necrosis, or abscess formation. Electronically Signed: Sharon Nguyen MD  4/30/2024 1:00 PM EDT  Workstation ID: [id number]                 Plan for Follow-up of Pending Labs/Results:     Discharge Details        Discharge Medications        Continue These Medications        Instructions Start Date   fenofibrate 48 MG tablet  Commonly known as: TRICOR   48 mg, Oral, Daily      metFORMIN  MG 24 hr tablet  Commonly known as: GLUCOPHAGE-XR   500 mg, Oral, Daily With Breakfast      Nicotine Step 1 21 MG/24HR patch  Generic drug: nicotine   1 patch, Transdermal, Every 24 Hours Scheduled             Stop These Medications      ondansetron 4 MG tablet  Commonly known as: Zofran              No Known Allergies      Discharge Disposition:  Home or Self Care    Diet:  Hospital:No active diet order           Activity:      Restrictions or Other Recommendations:  Low fat diet       CODE STATUS:    Code Status and Medical Interventions:   Ordered at: 04/30/24 1324     Code Status (Patient has no pulse and is not breathing):    CPR (Attempt to Resuscitate)     Medical Interventions (Patient has pulse or is breathing):    Full Support       Future Appointments   Date Time Provider Department Center   5/10/2024  9:30 AM Tang Rios MD [facility]       Additional Instructions for the Follow-ups that You Need to Schedule       Discharge Follow-up with PCP   As directed       Currently Documented PCP:    Tang Rios MD    PCP Phone Number:    [phone]     Follow Up Details: 1 week                      Agustina Bryan MD  05/03/24      Time Spent on Discharge:  I spent  35  minutes on this discharge activity which included: face-to-face encounter with the patient, reviewing the data in the system, coordination of the care with the nursing staff as well as consultants, documentation, and  entering orders.

## 2024-05-03 NOTE — PLAN OF CARE
Problem: Adult Inpatient Plan of Care  Goal: Plan of Care Review  Outcome: Met  Goal: Patient-Specific Goal (Individualized)  Outcome: Met  Goal: Absence of Hospital-Acquired Illness or Injury  Outcome: Met  Goal: Optimal Comfort and Wellbeing  Outcome: Met  Goal: Readiness for Transition of Care  Outcome: Met   Goal Outcome Evaluation:

## 2024-05-03 NOTE — PLAN OF CARE
Goal Outcome Evaluation:   Patient had no acute events during shift  C/of gas retention - see MAR- encouraged activity & movement  Pain control - see MAR

## 2024-05-03 NOTE — OUTREACH NOTE
Prep Survey      Flowsheet Row Responses   [facility] patient discharged from? [place]   Is LACE score < 7 ? No   Eligibility [facility]   Date of Admission 04/30/24   Date of Discharge 05/03/24   Discharge Disposition Home or Self Care   Discharge diagnosis Pancreatitis   Does the patient have one of the following disease processes/diagnoses(primary or secondary)? Other   Does the patient have Home health ordered? No   Is there a DME ordered? No   Prep survey completed? Yes            iMesha FRANKLIN - Registered Nurse

## 2024-05-04 ENCOUNTER — TRANSITIONAL CARE MANAGEMENT TELEPHONE ENCOUNTER (OUTPATIENT)
Dept: CALL CENTER | Facility: HOSPITAL | Age: 44
End: 2024-05-04
Payer: MEDICAID

## 2024-05-04 NOTE — OUTREACH NOTE
Call Center TCM Note      Flowsheet Row Responses   [facility] patient discharged from? [place]   Does the patient have one of the following disease processes/diagnoses(primary or secondary)? Other   TCM attempt successful? Yes   Call start time 1113   Call end time 1118   Discharge diagnosis Pancreatitis   Meds reviewed with patient/caregiver? Yes   Is the patient having any side effects they believe may be caused by any medication additions or changes? No   Does the patient have all medications ordered at discharge? Yes   Is the patient taking all medications as directed (includes completed medication regime)? Yes   Comments Hospital f/u appt. with PCP 5/10/24 @ 9:30am.   Does the patient have an appointment with their PCP within 7-14 days of discharge? Yes   Has home health visited the patient within 72 hours of discharge? N/A   Psychosocial issues? Yes  [Hxl alcohol abuse, polysubstance abuse.]   TCM call completed? Yes   Call end time 1118   Would this patient benefit from a Referral to Amb Social Work? No   Is the patient interested in additional calls from an ambulatory ? No            Lacie Villar RN    5/4/2024, 11:21 EDT

## 2024-05-04 NOTE — OUTREACH NOTE
Call Center TCM Note      Flowsheet Row Responses   [facility] patient discharged from? [place]   Does the patient have one of the following disease processes/diagnoses(primary or secondary)? Other   TCM attempt successful? No   Unsuccessful attempts Attempt 1            Lacie Villar RN    5/4/2024, 09:29 EDT

## 2024-07-01 RX ORDER — METFORMIN HYDROCHLORIDE 500 MG/1
500 TABLET, EXTENDED RELEASE ORAL
Qty: 30 TABLET | Refills: 0 | OUTPATIENT
Start: 2024-07-01 | End: 2024-07-31

## 2024-11-26 ENCOUNTER — HOSPITAL ENCOUNTER (INPATIENT)
Facility: HOSPITAL | Age: 44
LOS: 2 days | Discharge: HOME | End: 2024-11-29
Attending: EMERGENCY MEDICINE | Admitting: FAMILY MEDICINE
Payer: COMMERCIAL

## 2024-11-26 ENCOUNTER — APPOINTMENT (OUTPATIENT)
Dept: CT IMAGING | Facility: HOSPITAL | Age: 44
End: 2024-11-26
Payer: COMMERCIAL

## 2024-11-26 DIAGNOSIS — K85.20 ALCOHOL-INDUCED ACUTE PANCREATITIS, UNSPECIFIED COMPLICATION STATUS: Primary | ICD-10-CM

## 2024-11-26 DIAGNOSIS — R82.5 POSITIVE URINE DRUG SCREEN: ICD-10-CM

## 2024-11-26 DIAGNOSIS — K85.20 ALCOHOL-INDUCED ACUTE PANCREATITIS WITHOUT INFECTION OR NECROSIS: ICD-10-CM

## 2024-11-26 DIAGNOSIS — E13.649 UNCONTROLLED DIABETES MELLITUS OF OTHER TYPE WITH HYPOGLYCEMIA, UNSPECIFIED HYPOGLYCEMIA COMA STATUS: ICD-10-CM

## 2024-11-26 PROBLEM — Z72.0 TOBACCO USE: Status: ACTIVE | Noted: 2024-11-26

## 2024-11-26 PROBLEM — K85.90 PANCREATITIS: Status: ACTIVE | Noted: 2024-11-26

## 2024-11-26 LAB
ALBUMIN SERPL-MCNC: 4.2 G/DL (ref 3.5–5.2)
ALBUMIN/GLOB SERPL: 1.4 G/DL
ALP SERPL-CCNC: 88 U/L (ref 39–117)
ALT SERPL W P-5'-P-CCNC: 10 U/L (ref 1–41)
AMPHET+METHAMPHET UR QL: NEGATIVE
AMPHETAMINES UR QL: NEGATIVE
ANION GAP SERPL CALCULATED.3IONS-SCNC: 11 MMOL/L (ref 5–15)
AST SERPL-CCNC: 19 U/L (ref 1–40)
B-OH-BUTYR SERPL-SCNC: 0.15 MMOL/L (ref 0.02–0.27)
BARBITURATES UR QL SCN: NEGATIVE
BASOPHILS # BLD AUTO: 0.03 10*3/MM3 (ref 0–0.2)
BASOPHILS NFR BLD AUTO: 0.4 % (ref 0–1.5)
BENZODIAZ UR QL SCN: NEGATIVE
BILIRUB SERPL-MCNC: 0.6 MG/DL (ref 0–1.2)
BILIRUB UR QL STRIP: NEGATIVE
BUN SERPL-MCNC: 13 MG/DL (ref 6–20)
BUN/CREAT SERPL: 14.4 (ref 7–25)
BUPRENORPHINE SERPL-MCNC: NEGATIVE NG/ML
CALCIUM SPEC-SCNC: 9.4 MG/DL (ref 8.6–10.5)
CANNABINOIDS SERPL QL: NEGATIVE
CHLORIDE SERPL-SCNC: 93 MMOL/L (ref 98–107)
CHOLEST SERPL-MCNC: 329 MG/DL (ref 0–200)
CLARITY UR: CLEAR
CO2 SERPL-SCNC: 25 MMOL/L (ref 22–29)
COCAINE UR QL: POSITIVE
COLOR UR: YELLOW
CREAT BLDA-MCNC: 0.9 MG/DL (ref 0.6–1.3)
CREAT SERPL-MCNC: 0.9 MG/DL (ref 0.76–1.27)
D-LACTATE SERPL-SCNC: 2.5 MMOL/L (ref 0.5–2)
DEPRECATED RDW RBC AUTO: 39.8 FL (ref 37–54)
EGFRCR SERPLBLD CKD-EPI 2021: 108.7 ML/MIN/1.73
EOSINOPHIL # BLD AUTO: 0.15 10*3/MM3 (ref 0–0.4)
EOSINOPHIL NFR BLD AUTO: 2.1 % (ref 0.3–6.2)
ERYTHROCYTE [DISTWIDTH] IN BLOOD BY AUTOMATED COUNT: 12.7 % (ref 12.3–15.4)
ETHANOL BLD-MCNC: <10 MG/DL (ref 0–10)
FENTANYL UR-MCNC: NEGATIVE NG/ML
GLOBULIN UR ELPH-MCNC: 3 GM/DL
GLUCOSE BLDC GLUCOMTR-MCNC: 150 MG/DL (ref 70–130)
GLUCOSE SERPL-MCNC: 525 MG/DL (ref 65–99)
GLUCOSE UR STRIP-MCNC: ABNORMAL MG/DL
HBA1C MFR BLD: 10.9 % (ref 4.8–5.6)
HCT VFR BLD AUTO: 46.8 % (ref 37.5–51)
HDLC SERPL-MCNC: 29 MG/DL (ref 40–60)
HGB BLD-MCNC: 15.7 G/DL (ref 13–17.7)
HGB UR QL STRIP.AUTO: NEGATIVE
IMM GRANULOCYTES # BLD AUTO: 0.06 10*3/MM3 (ref 0–0.05)
IMM GRANULOCYTES NFR BLD AUTO: 0.9 % (ref 0–0.5)
KETONES UR QL STRIP: NEGATIVE
LDLC SERPL CALC-MCNC: ABNORMAL MG/DL
LDLC/HDLC SERPL: ABNORMAL {RATIO}
LEUKOCYTE ESTERASE UR QL STRIP.AUTO: NEGATIVE
LIPASE SERPL-CCNC: 96 U/L (ref 13–60)
LYMPHOCYTES # BLD AUTO: 2.63 10*3/MM3 (ref 0.7–3.1)
LYMPHOCYTES NFR BLD AUTO: 37.6 % (ref 19.6–45.3)
MCH RBC QN AUTO: 28.9 PG (ref 26.6–33)
MCHC RBC AUTO-ENTMCNC: 33.5 G/DL (ref 31.5–35.7)
MCV RBC AUTO: 86.2 FL (ref 79–97)
METHADONE UR QL SCN: NEGATIVE
MONOCYTES # BLD AUTO: 0.46 10*3/MM3 (ref 0.1–0.9)
MONOCYTES NFR BLD AUTO: 6.6 % (ref 5–12)
NEUTROPHILS NFR BLD AUTO: 3.67 10*3/MM3 (ref 1.7–7)
NEUTROPHILS NFR BLD AUTO: 52.4 % (ref 42.7–76)
NITRITE UR QL STRIP: NEGATIVE
NRBC BLD AUTO-RTO: 0 /100 WBC (ref 0–0.2)
NT-PROBNP SERPL-MCNC: <36 PG/ML (ref 0–450)
OPIATES UR QL: NEGATIVE
OXYCODONE UR QL SCN: NEGATIVE
PCP UR QL SCN: NEGATIVE
PH UR STRIP.AUTO: 5.5 [PH] (ref 5–8)
PLATELET # BLD AUTO: 271 10*3/MM3 (ref 140–450)
PMV BLD AUTO: 11.1 FL (ref 6–12)
POTASSIUM SERPL-SCNC: 4.4 MMOL/L (ref 3.5–5.2)
PROT SERPL-MCNC: 7.2 G/DL (ref 6–8.5)
PROT UR QL STRIP: NEGATIVE
RBC # BLD AUTO: 5.43 10*6/MM3 (ref 4.14–5.8)
SODIUM SERPL-SCNC: 129 MMOL/L (ref 136–145)
SP GR UR STRIP: 1.03 (ref 1–1.03)
TRICYCLICS UR QL SCN: NEGATIVE
TRIGL SERPL-MCNC: 1019 MG/DL (ref 0–150)
TROPONIN T SERPL HS-MCNC: 15 NG/L
UROBILINOGEN UR QL STRIP: ABNORMAL
VLDLC SERPL-MCNC: ABNORMAL MG/DL
WBC NRBC COR # BLD AUTO: 7 10*3/MM3 (ref 3.4–10.8)

## 2024-11-26 PROCEDURE — 82565 ASSAY OF CREATININE: CPT

## 2024-11-26 PROCEDURE — 93005 ELECTROCARDIOGRAM TRACING: CPT | Performed by: NURSE PRACTITIONER

## 2024-11-26 PROCEDURE — 81003 URINALYSIS AUTO W/O SCOPE: CPT | Performed by: NURSE PRACTITIONER

## 2024-11-26 PROCEDURE — 82010 KETONE BODYS QUAN: CPT | Performed by: NURSE PRACTITIONER

## 2024-11-26 PROCEDURE — 84100 ASSAY OF PHOSPHORUS: CPT | Performed by: NURSE PRACTITIONER

## 2024-11-26 PROCEDURE — 25510000001 IOPAMIDOL 61 % SOLUTION: Performed by: EMERGENCY MEDICINE

## 2024-11-26 PROCEDURE — 82077 ASSAY SPEC XCP UR&BREATH IA: CPT | Performed by: NURSE PRACTITIONER

## 2024-11-26 PROCEDURE — 82948 REAGENT STRIP/BLOOD GLUCOSE: CPT

## 2024-11-26 PROCEDURE — 84478 ASSAY OF TRIGLYCERIDES: CPT | Performed by: NURSE PRACTITIONER

## 2024-11-26 PROCEDURE — 83605 ASSAY OF LACTIC ACID: CPT | Performed by: NURSE PRACTITIONER

## 2024-11-26 PROCEDURE — 25810000003 SODIUM CHLORIDE 0.9 % SOLUTION: Performed by: NURSE PRACTITIONER

## 2024-11-26 PROCEDURE — 25010000002 HYDROMORPHONE PER 4 MG: Performed by: EMERGENCY MEDICINE

## 2024-11-26 PROCEDURE — 80061 LIPID PANEL: CPT | Performed by: NURSE PRACTITIONER

## 2024-11-26 PROCEDURE — 83690 ASSAY OF LIPASE: CPT | Performed by: NURSE PRACTITIONER

## 2024-11-26 PROCEDURE — 84484 ASSAY OF TROPONIN QUANT: CPT | Performed by: NURSE PRACTITIONER

## 2024-11-26 PROCEDURE — 83735 ASSAY OF MAGNESIUM: CPT | Performed by: NURSE PRACTITIONER

## 2024-11-26 PROCEDURE — 80053 COMPREHEN METABOLIC PANEL: CPT | Performed by: NURSE PRACTITIONER

## 2024-11-26 PROCEDURE — G0378 HOSPITAL OBSERVATION PER HR: HCPCS

## 2024-11-26 PROCEDURE — 74177 CT ABD & PELVIS W/CONTRAST: CPT

## 2024-11-26 PROCEDURE — 83880 ASSAY OF NATRIURETIC PEPTIDE: CPT | Performed by: NURSE PRACTITIONER

## 2024-11-26 PROCEDURE — 80307 DRUG TEST PRSMV CHEM ANLYZR: CPT | Performed by: NURSE PRACTITIONER

## 2024-11-26 PROCEDURE — 83721 ASSAY OF BLOOD LIPOPROTEIN: CPT | Performed by: NURSE PRACTITIONER

## 2024-11-26 PROCEDURE — 99285 EMERGENCY DEPT VISIT HI MDM: CPT

## 2024-11-26 PROCEDURE — 25010000002 ONDANSETRON PER 1 MG: Performed by: NURSE PRACTITIONER

## 2024-11-26 PROCEDURE — 63710000001 INSULIN REGULAR HUMAN PER 5 UNITS: Performed by: NURSE PRACTITIONER

## 2024-11-26 PROCEDURE — 83036 HEMOGLOBIN GLYCOSYLATED A1C: CPT | Performed by: NURSE PRACTITIONER

## 2024-11-26 PROCEDURE — 36415 COLL VENOUS BLD VENIPUNCTURE: CPT

## 2024-11-26 PROCEDURE — 99223 1ST HOSP IP/OBS HIGH 75: CPT | Performed by: NURSE PRACTITIONER

## 2024-11-26 PROCEDURE — 85025 COMPLETE CBC W/AUTO DIFF WBC: CPT | Performed by: NURSE PRACTITIONER

## 2024-11-26 RX ORDER — ONDANSETRON 2 MG/ML
4 INJECTION INTRAMUSCULAR; INTRAVENOUS ONCE
Status: COMPLETED | OUTPATIENT
Start: 2024-11-26 | End: 2024-11-26

## 2024-11-26 RX ORDER — SODIUM CHLORIDE 0.9 % (FLUSH) 0.9 %
10 SYRINGE (ML) INJECTION AS NEEDED
Status: DISCONTINUED | OUTPATIENT
Start: 2024-11-26 | End: 2024-11-29 | Stop reason: HOSPADM

## 2024-11-26 RX ORDER — PROCHLORPERAZINE EDISYLATE 5 MG/ML
10 INJECTION INTRAMUSCULAR; INTRAVENOUS ONCE
Status: DISCONTINUED | OUTPATIENT
Start: 2024-11-26 | End: 2024-11-27

## 2024-11-26 RX ORDER — ACETAMINOPHEN 650 MG/1
650 SUPPOSITORY RECTAL EVERY 4 HOURS PRN
Status: DISCONTINUED | OUTPATIENT
Start: 2024-11-26 | End: 2024-11-29 | Stop reason: HOSPADM

## 2024-11-26 RX ORDER — DEXTROSE MONOHYDRATE 25 G/50ML
25 INJECTION, SOLUTION INTRAVENOUS
Status: DISCONTINUED | OUTPATIENT
Start: 2024-11-26 | End: 2024-11-27

## 2024-11-26 RX ORDER — SODIUM CHLORIDE 9 MG/ML
100 INJECTION, SOLUTION INTRAVENOUS CONTINUOUS
Status: ACTIVE | OUTPATIENT
Start: 2024-11-26 | End: 2024-11-27

## 2024-11-26 RX ORDER — HYDROMORPHONE HYDROCHLORIDE 1 MG/ML
0.5 INJECTION, SOLUTION INTRAMUSCULAR; INTRAVENOUS; SUBCUTANEOUS
Status: DISCONTINUED | OUTPATIENT
Start: 2024-11-26 | End: 2024-11-29

## 2024-11-26 RX ORDER — NICOTINE POLACRILEX 4 MG
15 LOZENGE BUCCAL
Status: DISCONTINUED | OUTPATIENT
Start: 2024-11-26 | End: 2024-11-27

## 2024-11-26 RX ORDER — BISACODYL 10 MG
10 SUPPOSITORY, RECTAL RECTAL DAILY PRN
Status: DISCONTINUED | OUTPATIENT
Start: 2024-11-26 | End: 2024-11-29 | Stop reason: HOSPADM

## 2024-11-26 RX ORDER — POLYETHYLENE GLYCOL 3350 17 G/17G
17 POWDER, FOR SOLUTION ORAL DAILY PRN
Status: DISCONTINUED | OUTPATIENT
Start: 2024-11-26 | End: 2024-11-29 | Stop reason: HOSPADM

## 2024-11-26 RX ORDER — NICOTINE 21 MG/24HR
1 PATCH, TRANSDERMAL 24 HOURS TRANSDERMAL
Status: DISCONTINUED | OUTPATIENT
Start: 2024-11-27 | End: 2024-11-29 | Stop reason: HOSPADM

## 2024-11-26 RX ORDER — ONDANSETRON 2 MG/ML
4 INJECTION INTRAMUSCULAR; INTRAVENOUS EVERY 6 HOURS PRN
Status: DISCONTINUED | OUTPATIENT
Start: 2024-11-26 | End: 2024-11-29 | Stop reason: HOSPADM

## 2024-11-26 RX ORDER — BISACODYL 5 MG/1
5 TABLET, DELAYED RELEASE ORAL DAILY PRN
Status: DISCONTINUED | OUTPATIENT
Start: 2024-11-26 | End: 2024-11-29 | Stop reason: HOSPADM

## 2024-11-26 RX ORDER — IOPAMIDOL 612 MG/ML
100 INJECTION, SOLUTION INTRAVASCULAR
Status: COMPLETED | OUTPATIENT
Start: 2024-11-26 | End: 2024-11-26

## 2024-11-26 RX ORDER — NICOTINE POLACRILEX 4 MG
15 LOZENGE BUCCAL
Status: DISCONTINUED | OUTPATIENT
Start: 2024-11-26 | End: 2024-11-26 | Stop reason: SDUPTHER

## 2024-11-26 RX ORDER — ONDANSETRON 4 MG/1
4 TABLET, ORALLY DISINTEGRATING ORAL EVERY 6 HOURS PRN
Status: DISCONTINUED | OUTPATIENT
Start: 2024-11-26 | End: 2024-11-29 | Stop reason: HOSPADM

## 2024-11-26 RX ORDER — DEXTROSE MONOHYDRATE 100 MG/ML
1-2 INJECTION, SOLUTION INTRAVENOUS
Status: DISCONTINUED | OUTPATIENT
Start: 2024-11-26 | End: 2024-11-27

## 2024-11-26 RX ORDER — ACETAMINOPHEN 325 MG/1
650 TABLET ORAL EVERY 4 HOURS PRN
Status: DISCONTINUED | OUTPATIENT
Start: 2024-11-26 | End: 2024-11-29 | Stop reason: HOSPADM

## 2024-11-26 RX ORDER — AMOXICILLIN 250 MG
2 CAPSULE ORAL 2 TIMES DAILY
Status: DISCONTINUED | OUTPATIENT
Start: 2024-11-26 | End: 2024-11-29 | Stop reason: HOSPADM

## 2024-11-26 RX ORDER — HYDROMORPHONE HYDROCHLORIDE 1 MG/ML
0.5 INJECTION, SOLUTION INTRAMUSCULAR; INTRAVENOUS; SUBCUTANEOUS ONCE
Status: COMPLETED | OUTPATIENT
Start: 2024-11-26 | End: 2024-11-26

## 2024-11-26 RX ORDER — IBUPROFEN 600 MG/1
1 TABLET ORAL
Status: DISCONTINUED | OUTPATIENT
Start: 2024-11-26 | End: 2024-11-29 | Stop reason: HOSPADM

## 2024-11-26 RX ORDER — NITROGLYCERIN 0.4 MG/1
0.4 TABLET SUBLINGUAL
Status: DISCONTINUED | OUTPATIENT
Start: 2024-11-26 | End: 2024-11-29 | Stop reason: HOSPADM

## 2024-11-26 RX ORDER — FENOFIBRATE 48 MG/1
48 TABLET, COATED ORAL DAILY
Status: DISCONTINUED | OUTPATIENT
Start: 2024-11-27 | End: 2024-11-29 | Stop reason: HOSPADM

## 2024-11-26 RX ORDER — ACETAMINOPHEN 160 MG/5ML
650 SOLUTION ORAL EVERY 4 HOURS PRN
Status: DISCONTINUED | OUTPATIENT
Start: 2024-11-26 | End: 2024-11-29 | Stop reason: HOSPADM

## 2024-11-26 RX ADMIN — SODIUM CHLORIDE 1000 ML: 9 INJECTION, SOLUTION INTRAVENOUS at 19:37

## 2024-11-26 RX ADMIN — HYDROMORPHONE HYDROCHLORIDE 0.5 MG: 1 INJECTION, SOLUTION INTRAMUSCULAR; INTRAVENOUS; SUBCUTANEOUS at 20:01

## 2024-11-26 RX ADMIN — SODIUM CHLORIDE 100 ML/HR: 9 INJECTION, SOLUTION INTRAVENOUS at 22:18

## 2024-11-26 RX ADMIN — ONDANSETRON 4 MG: 2 INJECTION INTRAMUSCULAR; INTRAVENOUS at 19:37

## 2024-11-26 RX ADMIN — INSULIN HUMAN 6 UNITS: 100 INJECTION, SOLUTION PARENTERAL at 20:25

## 2024-11-26 RX ADMIN — IOPAMIDOL 85 ML: 612 INJECTION, SOLUTION INTRAVENOUS at 19:16

## 2024-11-26 NOTE — ED PROVIDER NOTES
Subjective   History of Present Illness  Pleasant patient presents the ER for left-sided flank pain does radiate into his back history of pancreatitis and this is similar.  History of hyperlipidemia high cholesterol or high triglycerides.  As well as uncontrolled diabetes. Hx of etoh use.        Review of Systems    Past Medical History:   Diagnosis Date    Alcohol abuse     Diverticulitis     GERD (gastroesophageal reflux disease)     HLD (hyperlipidemia)     HTN (hypertension)     Pancreatitis     Pancreatitis     Polysubstance abuse     Type 2 diabetes mellitus        No Known Allergies    History reviewed. No pertinent surgical history.    Family History   Problem Relation Age of Onset    Hypertension Father     Heart disease Father     Diabetes Father        Social History     Socioeconomic History    Marital status: Single   Tobacco Use    Smoking status: Every Day     Current packs/day: 1.50     Average packs/day: 1.5 packs/day for 29.9 years (44.9 ttl pk-yrs)     Types: Cigarettes     Start date: 1/1/1995    Smokeless tobacco: Never   Vaping Use    Vaping status: Never Used   Substance and Sexual Activity    Alcohol use: Yes     Alcohol/week: 24.0 standard drinks of alcohol     Types: 24 Cans of beer per week     Comment: 6 pack every other day    Drug use: Yes     Types: Marijuana, Cocaine(coke)     Comment: daily- Hasn't done cocaine since beginning of April 2017    Sexual activity: Defer           Objective   Physical Exam  Constitutional:       Appearance: He is well-developed.   HENT:      Head: Normocephalic and atraumatic.      Right Ear: External ear normal.      Left Ear: External ear normal.      Nose: Nose normal.   Eyes:      Conjunctiva/sclera: Conjunctivae normal.      Pupils: Pupils are equal, round, and reactive to light.   Cardiovascular:      Rate and Rhythm: Normal rate and regular rhythm.      Heart sounds: Normal heart sounds.   Pulmonary:      Effort: Pulmonary effort is normal.       Breath sounds: Normal breath sounds.   Abdominal:      General: Bowel sounds are normal.      Palpations: Abdomen is soft.      Tenderness:  in the left upper quadrant   Musculoskeletal:         General: Normal range of motion.      Cervical back: Normal range of motion and neck supple.   Skin:     General: Skin is warm and dry.   Neurological:      Mental Status: He is alert and oriented to person, place, and time.   Psychiatric:         Behavior: Behavior normal.         Judgment: Judgment normal.         Procedures           ED Course  ED Course as of 11/26/24 2042 Tue Nov 26, 2024   2280 Differential includes kidney stone.  Kidney failure.  Uncontrolled diabetes.  Less likely DKA.  History of pancreatitis and this is similar.  Will need to get lab work fluids CAT scan [ROGER]   2013 Patient with pancreatitis.  Uncontrolled diabetes.  Not consistent with DKA.  Will get admitted to the hospitalist. [ROGER]      ED Course User Index  [ROGER] Hakeem Nguyen APRN                                                  CT Abdomen Pelvis With Contrast   Final Result   Inflammation of the pancreas predominantly involving the pancreatic tail pancreatitis.               Electronically Signed: Tejas Ramirez MD     11/26/2024 7:38 PM EST     Workstation ID: [id number]               Medical Decision Making  Problems Addressed:  Alcohol-induced acute pancreatitis, unspecified complication status: complicated acute illness or injury  Positive urine drug screen: complicated acute illness or injury  Uncontrolled diabetes mellitus of other type with hypoglycemia, unspecified hypoglycemia coma status: complicated acute illness or injury    Amount and/or Complexity of Data Reviewed  External Data Reviewed: labs, radiology and ECG.  Labs: ordered. Decision-making details documented in ED Course.  Radiology: ordered. Decision-making details documented in ED Course.  ECG/medicine tests: ordered. Decision-making details documented in ED  Course.    Risk  OTC drugs.  Prescription drug management.  Decision regarding hospitalization.        Final diagnoses:   Alcohol-induced acute pancreatitis, unspecified complication status   Positive urine drug screen   Uncontrolled diabetes mellitus of other type with hypoglycemia, unspecified hypoglycemia coma status       ED Disposition  ED Disposition       ED Disposition   Decision to Admit    Condition   --    Comment   --               No follow-up provider specified.       Medication List      No changes were made to your prescriptions during this visit.            Hakeem Nguyen APRN  11/26/24 2017       Hakeem Nguyen APRN  11/26/24 2048

## 2024-11-27 PROBLEM — K85.90 PANCREATITIS: Status: ACTIVE | Noted: 2024-11-27

## 2024-11-27 LAB
ANION GAP SERPL CALCULATED.3IONS-SCNC: 11 MMOL/L (ref 5–15)
ANION GAP SERPL CALCULATED.3IONS-SCNC: 11 MMOL/L (ref 5–15)
ARTICHOKE IGE QN: 94 MG/DL (ref 0–100)
BASOPHILS # BLD AUTO: 0.04 10*3/MM3 (ref 0–0.2)
BASOPHILS NFR BLD AUTO: 0.6 % (ref 0–1.5)
BUN SERPL-MCNC: 13 MG/DL (ref 6–20)
BUN SERPL-MCNC: 13 MG/DL (ref 6–20)
BUN/CREAT SERPL: 15.7 (ref 7–25)
BUN/CREAT SERPL: 20.6 (ref 7–25)
CALCIUM SPEC-SCNC: 9.1 MG/DL (ref 8.6–10.5)
CALCIUM SPEC-SCNC: 9.1 MG/DL (ref 8.6–10.5)
CHLORIDE SERPL-SCNC: 100 MMOL/L (ref 98–107)
CHLORIDE SERPL-SCNC: 101 MMOL/L (ref 98–107)
CO2 SERPL-SCNC: 24 MMOL/L (ref 22–29)
CO2 SERPL-SCNC: 24 MMOL/L (ref 22–29)
CREAT SERPL-MCNC: 0.63 MG/DL (ref 0.76–1.27)
CREAT SERPL-MCNC: 0.83 MG/DL (ref 0.76–1.27)
D-LACTATE SERPL-SCNC: 1 MMOL/L (ref 0.5–2)
DEPRECATED RDW RBC AUTO: 41.1 FL (ref 37–54)
EGFRCR SERPLBLD CKD-EPI 2021: 111.4 ML/MIN/1.73
EGFRCR SERPLBLD CKD-EPI 2021: 121 ML/MIN/1.73
EOSINOPHIL # BLD AUTO: 0.23 10*3/MM3 (ref 0–0.4)
EOSINOPHIL NFR BLD AUTO: 3.5 % (ref 0.3–6.2)
ERYTHROCYTE [DISTWIDTH] IN BLOOD BY AUTOMATED COUNT: 12.7 % (ref 12.3–15.4)
GLUCOSE BLDC GLUCOMTR-MCNC: 143 MG/DL (ref 70–130)
GLUCOSE BLDC GLUCOMTR-MCNC: 152 MG/DL (ref 70–130)
GLUCOSE BLDC GLUCOMTR-MCNC: 169 MG/DL (ref 70–130)
GLUCOSE BLDC GLUCOMTR-MCNC: 174 MG/DL (ref 70–130)
GLUCOSE BLDC GLUCOMTR-MCNC: 189 MG/DL (ref 70–130)
GLUCOSE BLDC GLUCOMTR-MCNC: 200 MG/DL (ref 70–130)
GLUCOSE BLDC GLUCOMTR-MCNC: 206 MG/DL (ref 70–130)
GLUCOSE BLDC GLUCOMTR-MCNC: 220 MG/DL (ref 70–130)
GLUCOSE BLDC GLUCOMTR-MCNC: 404 MG/DL (ref 70–130)
GLUCOSE BLDC GLUCOMTR-MCNC: 439 MG/DL (ref 70–130)
GLUCOSE BLDC GLUCOMTR-MCNC: 72 MG/DL (ref 70–130)
GLUCOSE BLDC GLUCOMTR-MCNC: 85 MG/DL (ref 70–130)
GLUCOSE BLDC GLUCOMTR-MCNC: 91 MG/DL (ref 70–130)
GLUCOSE BLDC GLUCOMTR-MCNC: 92 MG/DL (ref 70–130)
GLUCOSE BLDC GLUCOMTR-MCNC: 99 MG/DL (ref 70–130)
GLUCOSE SERPL-MCNC: 105 MG/DL (ref 65–99)
GLUCOSE SERPL-MCNC: 184 MG/DL (ref 65–99)
HCT VFR BLD AUTO: 44.5 % (ref 37.5–51)
HGB BLD-MCNC: 14.6 G/DL (ref 13–17.7)
IMM GRANULOCYTES # BLD AUTO: 0.04 10*3/MM3 (ref 0–0.05)
IMM GRANULOCYTES NFR BLD AUTO: 0.6 % (ref 0–0.5)
LYMPHOCYTES # BLD AUTO: 2.93 10*3/MM3 (ref 0.7–3.1)
LYMPHOCYTES NFR BLD AUTO: 44.1 % (ref 19.6–45.3)
MAGNESIUM SERPL-MCNC: 1.9 MG/DL (ref 1.6–2.6)
MAGNESIUM SERPL-MCNC: 2 MG/DL (ref 1.6–2.6)
MCH RBC QN AUTO: 28.7 PG (ref 26.6–33)
MCHC RBC AUTO-ENTMCNC: 32.8 G/DL (ref 31.5–35.7)
MCV RBC AUTO: 87.4 FL (ref 79–97)
MONOCYTES # BLD AUTO: 0.59 10*3/MM3 (ref 0.1–0.9)
MONOCYTES NFR BLD AUTO: 8.9 % (ref 5–12)
NEUTROPHILS NFR BLD AUTO: 2.81 10*3/MM3 (ref 1.7–7)
NEUTROPHILS NFR BLD AUTO: 42.3 % (ref 42.7–76)
NRBC BLD AUTO-RTO: 0 /100 WBC (ref 0–0.2)
PHOSPHATE SERPL-MCNC: 3.7 MG/DL (ref 2.5–4.5)
PHOSPHATE SERPL-MCNC: 4.1 MG/DL (ref 2.5–4.5)
PLATELET # BLD AUTO: 221 10*3/MM3 (ref 140–450)
PMV BLD AUTO: 10.8 FL (ref 6–12)
POTASSIUM SERPL-SCNC: 4.1 MMOL/L (ref 3.5–5.2)
POTASSIUM SERPL-SCNC: 4.2 MMOL/L (ref 3.5–5.2)
RBC # BLD AUTO: 5.09 10*6/MM3 (ref 4.14–5.8)
SODIUM SERPL-SCNC: 135 MMOL/L (ref 136–145)
SODIUM SERPL-SCNC: 136 MMOL/L (ref 136–145)
SODIUM SERPL-SCNC: 136 MMOL/L (ref 136–145)
SODIUM SERPL-SCNC: 137 MMOL/L (ref 136–145)
SODIUM SERPL-SCNC: 139 MMOL/L (ref 136–145)
TRIGL SERPL-MCNC: 1109 MG/DL (ref 0–150)
TRIGL SERPL-MCNC: 440 MG/DL (ref 0–150)
WBC NRBC COR # BLD AUTO: 6.64 10*3/MM3 (ref 3.4–10.8)

## 2024-11-27 PROCEDURE — 83735 ASSAY OF MAGNESIUM: CPT | Performed by: NURSE PRACTITIONER

## 2024-11-27 PROCEDURE — 25010000002 HYDROMORPHONE PER 4 MG: Performed by: NURSE PRACTITIONER

## 2024-11-27 PROCEDURE — 80048 BASIC METABOLIC PNL TOTAL CA: CPT | Performed by: NURSE PRACTITIONER

## 2024-11-27 PROCEDURE — 82948 REAGENT STRIP/BLOOD GLUCOSE: CPT

## 2024-11-27 PROCEDURE — 84295 ASSAY OF SERUM SODIUM: CPT | Performed by: NURSE PRACTITIONER

## 2024-11-27 PROCEDURE — 84478 ASSAY OF TRIGLYCERIDES: CPT | Performed by: NURSE PRACTITIONER

## 2024-11-27 PROCEDURE — 99232 SBSQ HOSP IP/OBS MODERATE 35: CPT | Performed by: FAMILY MEDICINE

## 2024-11-27 PROCEDURE — 85025 COMPLETE CBC W/AUTO DIFF WBC: CPT | Performed by: NURSE PRACTITIONER

## 2024-11-27 PROCEDURE — 99222 1ST HOSP IP/OBS MODERATE 55: CPT | Performed by: PHYSICIAN ASSISTANT

## 2024-11-27 PROCEDURE — 63710000001 INSULIN LISPRO (HUMAN) PER 5 UNITS: Performed by: FAMILY MEDICINE

## 2024-11-27 PROCEDURE — 84100 ASSAY OF PHOSPHORUS: CPT | Performed by: NURSE PRACTITIONER

## 2024-11-27 RX ORDER — INSULIN LISPRO 100 [IU]/ML
2-7 INJECTION, SOLUTION INTRAVENOUS; SUBCUTANEOUS
Status: DISCONTINUED | OUTPATIENT
Start: 2024-11-27 | End: 2024-11-28

## 2024-11-27 RX ORDER — NICOTINE POLACRILEX 4 MG
15 LOZENGE BUCCAL
Status: DISCONTINUED | OUTPATIENT
Start: 2024-11-27 | End: 2024-11-29 | Stop reason: HOSPADM

## 2024-11-27 RX ORDER — DEXTROSE MONOHYDRATE 25 G/50ML
25 INJECTION, SOLUTION INTRAVENOUS
Status: DISCONTINUED | OUTPATIENT
Start: 2024-11-27 | End: 2024-11-29 | Stop reason: HOSPADM

## 2024-11-27 RX ORDER — IBUPROFEN 600 MG/1
1 TABLET ORAL
Status: DISCONTINUED | OUTPATIENT
Start: 2024-11-27 | End: 2024-11-29 | Stop reason: HOSPADM

## 2024-11-27 RX ADMIN — HYDROMORPHONE HYDROCHLORIDE 0.5 MG: 1 INJECTION, SOLUTION INTRAMUSCULAR; INTRAVENOUS; SUBCUTANEOUS at 08:13

## 2024-11-27 RX ADMIN — HYDROMORPHONE HYDROCHLORIDE 0.5 MG: 1 INJECTION, SOLUTION INTRAMUSCULAR; INTRAVENOUS; SUBCUTANEOUS at 05:03

## 2024-11-27 RX ADMIN — HYDROMORPHONE HYDROCHLORIDE 0.5 MG: 1 INJECTION, SOLUTION INTRAMUSCULAR; INTRAVENOUS; SUBCUTANEOUS at 00:19

## 2024-11-27 RX ADMIN — HYDROMORPHONE HYDROCHLORIDE 0.5 MG: 1 INJECTION, SOLUTION INTRAMUSCULAR; INTRAVENOUS; SUBCUTANEOUS at 19:28

## 2024-11-27 RX ADMIN — DEXTROSE MONOHYDRATE 0.87 ML/KG/HR: 100 INJECTION, SOLUTION INTRAVENOUS at 06:03

## 2024-11-27 RX ADMIN — HYDROMORPHONE HYDROCHLORIDE 0.5 MG: 1 INJECTION, SOLUTION INTRAMUSCULAR; INTRAVENOUS; SUBCUTANEOUS at 23:57

## 2024-11-27 RX ADMIN — HYDROMORPHONE HYDROCHLORIDE 0.5 MG: 1 INJECTION, SOLUTION INTRAMUSCULAR; INTRAVENOUS; SUBCUTANEOUS at 12:30

## 2024-11-27 RX ADMIN — INSULIN HUMAN 0.05 UNITS/KG/HR: 1 INJECTION, SOLUTION INTRAVENOUS at 00:21

## 2024-11-27 RX ADMIN — INSULIN LISPRO 2 UNITS: 100 INJECTION, SOLUTION INTRAVENOUS; SUBCUTANEOUS at 17:01

## 2024-11-27 RX ADMIN — DEXTROSE MONOHYDRATE 1 ML/KG/HR: 100 INJECTION, SOLUTION INTRAVENOUS at 00:27

## 2024-11-27 RX ADMIN — SENNOSIDES AND DOCUSATE SODIUM 2 TABLET: 50; 8.6 TABLET ORAL at 20:49

## 2024-11-27 RX ADMIN — NICOTINE 1 PATCH: 21 PATCH TRANSDERMAL at 08:02

## 2024-11-27 RX ADMIN — FENOFIBRATE 48 MG: 48 TABLET ORAL at 08:03

## 2024-11-27 RX ADMIN — SENNOSIDES AND DOCUSATE SODIUM 2 TABLET: 50; 8.6 TABLET ORAL at 08:03

## 2024-11-27 RX ADMIN — INSULIN LISPRO 7 UNITS: 100 INJECTION, SOLUTION INTRAVENOUS; SUBCUTANEOUS at 20:49

## 2024-11-27 RX ADMIN — DEXTROSE MONOHYDRATE 1.87 ML/KG/HR: 100 INJECTION, SOLUTION INTRAVENOUS at 11:26

## 2024-11-27 RX ADMIN — DEXTROSE MONOHYDRATE 1.18 ML/KG/HR: 100 INJECTION, SOLUTION INTRAVENOUS at 06:55

## 2024-11-27 RX ADMIN — DEXTROSE MONOHYDRATE 0.75 ML/KG/HR: 100 INJECTION, SOLUTION INTRAVENOUS at 03:01

## 2024-11-27 NOTE — CONSULTS
"Diabetes Education  Assessment/Teaching    Patient Name:  Yg Araujo  YOB: 1980  MRN: [id number]  Admit Date:  11/26/2024      Assessment Date:  11/27/2024  Flowsheet Row Most Recent Value   General Information     Referral From: A1c  [10.9%]   Height 177.8 cm (70\")   Height Method Stated   Weight 80.3 kg (177 lb)   Weight Method Stated   Is patient pregnant? n/a   Pregnancy Assessment    Diabetes History    What type of diabetes do you have? Type 2   Length of Diabetes Diagnosis Newly diagnosed <6 months   Have you had diabetes education/teaching in the past? yes   When and where was your diabetes education? only while in hospital in May 2024   Do you test your blood sugar at home? yes   Frequency of checks once daily   Meter type One Touch Verio Reflect   Who performs the test? self   Have you had low blood sugar? (<70mg/dl) yes  [stated he thinks he has]   Have you had high blood sugar? (>140mg/dl) yes   How often do you have high blood sugar? frequently   How often do you check your feet? daily  [we discussed importance of foot care daily and good shoes and socks and taught toenail care recommendations.]   Do you perform your own nail care? no  [He states he has been afraid to do it.  Encouraged him to ask provider about podiatry visit.]   Has diabetes caused a problem in your life (work/school/family/friends, etc.)? yes  [states he has had to call in to work related to medication side effects.]   What makes it difficult for you to take care of your diabetes or yourself? cost of provider follow up co-pay, he stated, deterred him from follow up visit, so he was not able to get refill for metformin   Do you have any diabetes complications? neuropathy, recurring infections  [recurring pancreatitis]   Education Preferences    What areas of diabetes would you like to learn about? avoiding high blood sugar, avoiding low blood sugar, diabetes complications, diet information, exercise information, " testing my blood sugar at home, understanding diabetes   Barriers to Learning changes in sensation, financial stress/problems   Nutrition Information    How many meals do you eat each day? 1  [stated eats lunch daily, rarely has breakfast, 4 out of 7 days has supper when gets home from work.]   What is the biggest challenge you have with your diet? Knowledge, Cooking, Food preperation   What type of support do you currently use to help you with your health issues? family, providers   Assessment Topics    Healthy Eating - Assessment Needs education   Being Active - Assessment Needs education   Taking Medication - Assessment Needs education   Problem Solving - Assessment Needs education   Reducing Risk - Assessment Needs education   Healthy Coping - Assessment Needs education   Monitoring - Assessment Needs education   DM Goals    Contact Plan Follow-up medical care, Other (comment)  [offered outpatient diabetes education.]            Flowsheet Row Most Recent Value   DM Education Needs    Meter Has own   Meter Type One Touch   Frequency of Testing Daily   Blood Glucose Target 150   Blood Glucose Target Range    Medication Oral   Problem Solving Hypoglycemia, Hyperglycemia, Signs, Symptoms, Treatment   Reducing Risks A1C testing, Blood pressure, Eye exam, Foot care, Other (comment)  [stated he is having issues related to ED.  Encouraged to speak with provider about treatment.]   Healthy Eating RD consult   Discharge Plan Home   Motivation Engaged   Teaching Method Explanation, Discussion, Handouts   Patient Response Verbalized understanding              Other Comments:    Spoke to Mr. Araujo at the bedside this morning.  He stated that he was diagnosed with diabetes at his hospitalization in May 2024.  He stated that he was given metformin to take at home.  He stated that he ran out of medication, and whn he tried to contact provider to get refill, they said he had to come in to see provider for medication  refill.  He stated that he could not afford co-pay, so he did not go which meant he went without Metformin since his last visit here in May.  Explained that he needed to speak with provider over the phone to investigate medication options should the cost of follow up be a problem in the future.  Explained that ideally he needs to follow up with provider for diabetes care every 3 months.   He states that he was given a One Touch Verio Reflect after his previous stay to check his blood sugar.  He states that he checked it daily until he ran out of supplies.  Encouraged him that we would try to get a new script for strips and lancets to check once daily.  Explained that he could even check every other day, but he needed to check before meals and 2 hours after meals during each week.  Explained that his blood sugar should be  before meals and  2 hours after meals in order to reflect an A1c less than 7%.  Explained A1c to him and that his A1c was 10.9%.  Explained complications of diabetes he is at risk for if it stays high.  He states that he is experiencing ED for the past 4 months.  He asked why and what he could do about it.  Encouraged him to speak with provider about it for possible treatment options.  Explained physiology behind it.  Reviewed importance of eye exam, daily foot care, and toenail care.    He stated that he typically only eats one meal daily, lunch.  He states that he eats supper too about 4/7 days a week.  He states that he is often too tired when he gets home to cook meal.  Reviewed plate method with him and gave some examples of food and carbohydrate content.  I asked if he would like to speak with a dietician while he is here, and he stated he would.  Consult was ordered.  Spoke with him about alcohol consumption.  Explained affect of alcohol on empty stomach related to blood sugar.  Encouraged him to decrease amount of alcohol consumed and be sure not to drink on an empty stomach.   Explained increased risk for low blood sugar, and he stated he thinks he has experienced that.  Reviewed low blood sugar symptoms and how to treat it less than 70.  Reviewed high blood sugar symptoms too and importance of drinking 64 oz of water daily.    We discussed exercise and how he can use that to help control blood sugar.  Encouraged him to exercise about 1-2 h after a larger meal to help burn the extra calories.  Explained ADA goal of 150 min of aerobic exercise per week. He was given our What is Diabetes handout and our contact information.  Encouraged him to call with any questions in the future and to think about scheduling an outpatient class when he is feeling better. Thank you for this referral.         Electronically signed by:  Olive Ortega RN  11/27/24 11:48 EST

## 2024-11-27 NOTE — PLAN OF CARE
Problem: Adult Inpatient Plan of Care  Goal: Plan of Care Review  Outcome: Progressing  Goal: Patient-Specific Goal (Individualized)  Outcome: Progressing  Goal: Absence of Hospital-Acquired Illness or Injury  Outcome: Progressing  Intervention: Identify and Manage Fall Risk  Recent Flowsheet Documentation  Taken 11/27/2024 1600 by Kat Washington RN  Safety Promotion/Fall Prevention:   activity supervised   assistive device/personal items within reach   clutter free environment maintained   lighting adjusted   nonskid shoes/slippers when out of bed  Taken 11/27/2024 1400 by Kat Washington RN  Safety Promotion/Fall Prevention:   activity supervised   assistive device/personal items within reach   clutter free environment maintained   lighting adjusted   nonskid shoes/slippers when out of bed  Taken 11/27/2024 1200 by Kat Washington RN  Safety Promotion/Fall Prevention:   activity supervised   assistive device/personal items within reach   clutter free environment maintained   lighting adjusted   nonskid shoes/slippers when out of bed  Taken 11/27/2024 1000 by Kat Washington RN  Safety Promotion/Fall Prevention: safety round/check completed  Taken 11/27/2024 0800 by Kat Washington RN  Safety Promotion/Fall Prevention: safety round/check completed  Intervention: Prevent Skin Injury  Recent Flowsheet Documentation  Taken 11/27/2024 1600 by Kat Washington RN  Body Position: position changed independently  Skin Protection:   incontinence pads utilized   transparent dressing maintained  Taken 11/27/2024 1400 by Kat Washington RN  Body Position: position changed independently  Skin Protection:   incontinence pads utilized   transparent dressing maintained  Taken 11/27/2024 1200 by Kat Wsahington RN  Body Position: position changed independently  Skin Protection:   incontinence pads utilized   transparent dressing maintained  Taken 11/27/2024 1000 by Kat Washington RN  Body Position:  position changed independently  Skin Protection:   incontinence pads utilized   transparent dressing maintained  Taken 11/27/2024 0800 by Kat Washington RN  Body Position: position changed independently  Skin Protection:   incontinence pads utilized   skin sealant/moisture barrier applied  Intervention: Prevent Infection  Recent Flowsheet Documentation  Taken 11/27/2024 1600 by Kat Washington RN  Infection Prevention:   environmental surveillance performed   single patient room provided  Taken 11/27/2024 1400 by Kat Washington RN  Infection Prevention:   environmental surveillance performed   single patient room provided  Taken 11/27/2024 1200 by Kat Washington RN  Infection Prevention:   environmental surveillance performed   single patient room provided  Taken 11/27/2024 1000 by Kat Washington RN  Infection Prevention: single patient room provided  Taken 11/27/2024 0800 by Kat Washington RN  Infection Prevention: single patient room provided  Goal: Optimal Comfort and Wellbeing  Outcome: Progressing  Goal: Readiness for Transition of Care  Outcome: Progressing     Problem: Pancreatitis  Goal: Fluid and Electrolyte Balance  Outcome: Progressing  Goal: Absence of Infection Signs and Symptoms  Outcome: Progressing  Goal: Optimal Nutrition Delivery  Outcome: Progressing  Goal: Optimal Pain Control and Function  Outcome: Progressing   Goal Outcome Evaluation:   Insulin drip and D10 discontinued at 1212. Pt tolerating diet. Advanced to consistent carb, regular, thins. Morphine given for pain. VSS. NSR. RA., No complaints at this time. Call bell within reach.

## 2024-11-27 NOTE — ED NOTES
Yg Araujo    Nursing Report ED to Floor:  Mental status: A&Ox4  Ambulatory status: Stand by  Oxygen Therapy:  RA  Cardiac Rhythm: NS  Admitted from: Home  Safety Concerns:  n/a  Social Issues: n/a  ED Room #:  30    ED Nurse Phone Extension - [phone] or may call [phone].      HPI:   Chief Complaint   Patient presents with    Abdominal Pain       Past Medical History:  Past Medical History:   Diagnosis Date    Alcohol abuse     Diverticulitis     GERD (gastroesophageal reflux disease)     HLD (hyperlipidemia)     HTN (hypertension)     Pancreatitis     Pancreatitis     Polysubstance abuse     Type 2 diabetes mellitus         Past Surgical History:  History reviewed. No pertinent surgical history.     Admitting Doctor:   No admitting provider for patient encounter.    Consulting Provider(s):  Consults       No orders found from 10/28/2024 to 11/27/2024.             Admitting Diagnosis:   The primary encounter diagnosis was Alcohol-induced acute pancreatitis, unspecified complication status. Diagnoses of Positive urine drug screen and Uncontrolled diabetes mellitus of other type with hypoglycemia, unspecified hypoglycemia coma status were also pertinent to this visit.    Most Recent Vitals:   Vitals:    11/26/24 2007 11/26/24 2012 11/26/24 2017 11/26/24 2022   BP:       BP Location:       Patient Position:       Pulse: 77 75 73 72   Resp:       Temp:       TempSrc:       SpO2: 94% 93% 95% 95%   Weight:       Height:           Active LDAs/IV Access:   Lines, Drains & Airways       Active LDAs       Name Placement date Placement time Site Days    Peripheral IV 11/26/24 1819 Left Antecubital 11/26/24 1819  Antecubital  less than 1                    Labs (abnormal labs have a star):   Labs Reviewed   COMPREHENSIVE METABOLIC PANEL - Abnormal; Notable for the following components:       Result Value    Glucose 525 (*)     Sodium 129 (*)     Chloride 93 (*)     All other components within normal limits    Narrative:      GFR Normal >60  Chronic Kidney Disease <60  Kidney Failure <15     LIPASE - Abnormal; Notable for the following components:    Lipase 96 (*)     All other components within normal limits   URINALYSIS W/ MICROSCOPIC IF INDICATED (NO CULTURE) - Abnormal; Notable for the following components:    Specific Gravity, UA 1.033 (*)     Glucose, UA >=1000 mg/dL (3+) (*)     All other components within normal limits    Narrative:     Urine microscopic not indicated.   LACTIC ACID, PLASMA - Abnormal; Notable for the following components:    Lactate 2.5 (*)     All other components within normal limits   URINE DRUG SCREEN - Abnormal; Notable for the following components:    Cocaine Screen, Urine Positive (*)     All other components within normal limits    Narrative:     Cutoff For Drugs Screened:    Amphetamines               500 ng/ml  Barbiturates               200 ng/ml  Benzodiazepines            150 ng/ml  Cocaine                    150 ng/ml  Methadone                  200 ng/ml  Opiates                    100 ng/ml  Phencyclidine               25 ng/ml  THC                         50 ng/ml  Methamphetamine            500 ng/ml  Tricyclic Antidepressants  300 ng/ml  Oxycodone                  100 ng/ml  Buprenorphine               10 ng/ml    The normal value for all drugs tested is negative. This report includes unconfirmed screening results, with the cutoff values listed, to be used for medical treatment purposes only.  Unconfirmed results must not be used for non-medical purposes such as employment or legal testing.  Clinical consideration should be applied to any drug of abuse test, particularly when unconfirmed results are used.     HEMOGLOBIN A1C - Abnormal; Notable for the following components:    Hemoglobin A1C 10.90 (*)     All other components within normal limits    Narrative:     Hemoglobin A1C Ranges:    Increased Risk for Diabetes  5.7% to 6.4%  Diabetes                     >= 6.5%  Diabetic Goal                 < 7.0%   CBC WITH AUTO DIFFERENTIAL - Abnormal; Notable for the following components:    Immature Grans % 0.9 (*)     Immature Grans, Absolute 0.06 (*)     All other components within normal limits   SINGLE HS TROPONIN T - Normal    Narrative:     High Sensitive Troponin T Reference Range:  <14.0 ng/L- Negative Female for AMI  <22.0 ng/L- Negative Male for AMI  >=14 - Abnormal Female indicating possible myocardial injury.  >=22 - Abnormal Male indicating possible myocardial injury.   Clinicians would have to utilize clinical acumen, EKG, Troponin, and serial changes to determine if it is an Acute Myocardial Infarction or myocardial injury due to an underlying chronic condition.        ETHANOL - Normal    Narrative:     Elevated lactic acid concentration and lactate dehydrogenase(LD) activity may falsely elevate enzymatically determined ethanol levels. Not for legal purposes.    BNP (IN-HOUSE) - Normal    Narrative:     This assay is used as an aid in the diagnosis of individuals suspected of having heart failure. It can be used as an aid in the diagnosis of acute decompensated heart failure (ADHF) in patients presenting with signs and symptoms of ADHF to the emergency department (ED). In addition, NT-proBNP of <300 pg/mL indicates ADHF is not likely.    Age Range Result Interpretation  NT-proBNP Concentration (pg/mL:      <50             Positive            >450                   Gray                 300-450                    Negative             <300    50-75           Positive            >900                  Gray                300-900                  Negative            <300      >75             Positive            >1800                  Gray                300-1800                  Negative            <300   FENTANYL, URINE - Normal    Narrative:     Negative Threshold:      Fentanyl 5 ng/mL     The normal value for the drug tested is negative. This report includes final unconfirmed screening results to be  used for medical treatment purposes only. Unconfirmed results must not be used for non-medical purposes such as employment or legal testing. Clinical consideration should be applied to any drug of abuse test, particularly when unconfirmed results are used.          BETA HYDROXYBUTYRATE QUANTITATIVE - Normal    Narrative:     In the assessment of possible diabetic ketoacidosis, the test should be interpreted along with other clinical and laboratory findings.  A level greater than 1 mmol/L should require further evaluation and levels of more than 3 mmol/L require immediate medical review.   POCT CREATININE - Normal   LACTIC ACID, REFLEX   CBC AND DIFFERENTIAL    Narrative:     The following orders were created for panel order CBC & Differential.  Procedure                               Abnormality         Status                     ---------                               -----------         ------                     CBC Auto Differential[[id number]]        Abnormal            Final result                 Please view results for these tests on the individual orders.       Meds Given in ED:   Medications   sodium chloride 0.9 % flush 10 mL (has no administration in time range)   prochlorperazine (COMPAZINE) injection 10 mg (0 mg Intravenous Hold 11/26/24 2003)   insulin regular (humuLIN R,novoLIN R) injection 6 Units (has no administration in time range)   sodium chloride 0.9 % bolus 1,000 mL (1,000 mL Intravenous New Bag 11/26/24 1937)   ondansetron (ZOFRAN) injection 4 mg (4 mg Intravenous Given 11/26/24 1937)   iopamidol (ISOVUE-300) 61 % injection 100 mL (85 mL Intravenous Given 11/26/24 1916)   HYDROmorphone (DILAUDID) injection 0.5 mg (0.5 mg Intravenous Given 11/26/24 2001)           Last NIH score:                                                          Dysphagia screening results:        Rickey Coma Scale:  No data recorded     CIWA:        Restraint Type:            Isolation Status:  No active isolations

## 2024-11-27 NOTE — H&P
"    [facility] Medicine Services  HISTORY AND PHYSICAL    Patient Name: Yg Araujo  : 1980  MRN: [id number]  Primary Care Physician: Tang Rios MD  Date of admission: 2024    Subjective   Subjective     Chief Complaint:  Abdominal pain    HPI:  Yg Araujo is a 44 y.o. male with past medical history significant for alcohol pancreatitis, hypertriglyceridemia, polysubstance use, tobacco use and diabetes mellitus type 2 presents to the ED with complaints of abdominal pain with radiation into his back that has progressively worsened over the past 24/48 hours.  He does report associated nausea and vomiting.  He denies any fever, chills, cough, chest pain, dizziness, dysuria, or melena.  He does report some shortness of breath.  Workup in the ED tonight included CT abdomen and pelvis that shows inflammation of the pancreas predominantly involving the pancreatic tail pancreatitis.  UDS obtained tonight is positive for cocaine use.  Patient tells me that he \"partied too hard over the weekend.\"  His last alcohol use was this past weekend.  Patient additionally reports he has not had his metformin or Tricor since his last admission at [facility] in May 2024.          Review of Systems   Constitutional:  Positive for appetite change. Negative for activity change, chills, diaphoresis, fatigue, fever and unexpected weight change.   HENT: Negative.     Eyes:  Negative for photophobia and visual disturbance.   Respiratory:  Positive for shortness of breath. Negative for cough.    Cardiovascular:  Negative for chest pain, palpitations and leg swelling.   Gastrointestinal:  Positive for abdominal pain, nausea and vomiting. Negative for abdominal distention, anal bleeding, blood in stool, constipation and diarrhea.   Genitourinary:  Negative for difficulty urinating, dysuria, flank pain, frequency and hematuria.   Musculoskeletal:  Positive for back pain. Negative " for neck pain and neck stiffness.   Skin: Negative.    Neurological:  Negative for dizziness, speech difficulty, weakness, light-headedness, numbness and headaches.   Psychiatric/Behavioral: Negative.                  Personal History     Past Medical History:   Diagnosis Date    Alcohol abuse     Diverticulitis     GERD (gastroesophageal reflux disease)     HLD (hyperlipidemia)     HTN (hypertension)     Pancreatitis     Pancreatitis     Polysubstance abuse     Type 2 diabetes mellitus              History reviewed. No pertinent surgical history.    Family History:  family history includes Diabetes in his father; Heart disease in his father; Hypertension in his father.     Social History:  reports that he has been smoking cigarettes. He started smoking about 29 years ago. He has a 44.9 pack-year smoking history. He has never used smokeless tobacco. He reports current alcohol use of about 24.0 standard drinks of alcohol per week. He reports current drug use. Drugs: Marijuana and Cocaine(coke).  Social History     Social History Narrative    Not on file       Medications:  fenofibrate, metFORMIN ER, and nicotine    No Known Allergies    Objective   Objective     Vital Signs:   Temp:  [98.2 °F (36.8 °C)] 98.2 °F (36.8 °C)  Heart Rate:  [72-91] 72  Resp:  [16] 16  BP: (129)/(88) 129/88    Physical Exam  Vitals and nursing note reviewed.   Constitutional:       General: He is not in acute distress.     Appearance: Normal appearance. He is not ill-appearing, toxic-appearing or diaphoretic.   HENT:      Head: Normocephalic.      Nose: Nose normal.      Mouth/Throat:      Mouth: Mucous membranes are dry.      Pharynx: Oropharynx is clear.   Eyes:      Extraocular Movements: Extraocular movements intact.      Conjunctiva/sclera: Conjunctivae normal.      Pupils: Pupils are equal, round, and reactive to light.   Cardiovascular:      Rate and Rhythm: Normal rate and regular rhythm.      Pulses: Normal pulses.      Heart  sounds: Normal heart sounds.   Pulmonary:      Effort: Pulmonary effort is normal.      Breath sounds: Wheezing present.      Comments: Mild wheezing to RLL   Abdominal:      General: Bowel sounds are normal. There is no distension.      Palpations: Abdomen is soft. There is no mass.      Tenderness: There is abdominal tenderness. There is no right CVA tenderness, left CVA tenderness, guarding or rebound.      Hernia: No hernia is present.      Comments: Epigastric tenderness    Musculoskeletal:         General: No swelling, tenderness, deformity or signs of injury. Normal range of motion.      Cervical back: Normal range of motion and neck supple.      Right lower leg: No edema.      Left lower leg: No edema.   Skin:     General: Skin is warm and dry.   Neurological:      General: No focal deficit present.      Mental Status: He is alert and oriented to person, place, and time.   Psychiatric:         Mood and Affect: Mood normal.         Behavior: Behavior normal.         Thought Content: Thought content normal.            Result Review:  I have personally reviewed the results from the time of this admission to 11/26/2024 21:19 EST and agree with these findings:  [x]  Laboratory list / accordion  [x]  Microbiology  [x]  Radiology  [x]  EKG/Telemetry   []  Cardiology/Vascular   []  Pathology  []  Old records  []  Other:  Most notable findings include:     LAB RESULTS:      Lab 11/26/24  1814   WBC 7.00   HEMOGLOBIN 15.7   HEMATOCRIT 46.8   PLATELETS 271   NEUTROS ABS 3.67   IMMATURE GRANS (ABS) 0.06*   LYMPHS ABS 2.63   MONOS ABS 0.46   EOS ABS 0.15   MCV 86.2   LACTATE 2.5*         Lab 11/26/24  1821 11/26/24  1814   SODIUM  --  129*   POTASSIUM  --  4.4   CHLORIDE  --  93*   CO2  --  25.0   ANION GAP  --  11.0   BUN  --  13   CREATININE 0.90 0.90   EGFR  --  108.7   GLUCOSE  --  525*   CALCIUM  --  9.4   HEMOGLOBIN A1C  --  10.90*         Lab 11/26/24  1814   TOTAL PROTEIN 7.2   ALBUMIN 4.2   GLOBULIN 3.0   ALT  (SGPT) 10   AST (SGOT) 19   BILIRUBIN 0.6   ALK PHOS 88   LIPASE 96*         Lab 11/26/24 1814   PROBNP <36.0   HSTROP T 15                 Brief Urine Lab Results  (Last result in the past 365 days)        Color   Clarity   Blood   Leuk Est   Nitrite   Protein   CREAT   Urine HCG        11/26/24 1816 Yellow   Clear   Negative   Negative   Negative   Negative                 Microbiology Results (last 10 days)       ** No results found for the last 240 hours. **            CT Abdomen Pelvis With Contrast    Result Date: 11/26/2024  CT ABDOMEN PELVIS W CONTRAST Date of Exam: 11/26/2024 7:12 PM EST Indication: left abd pain. hx of pancreatitis.. Comparison: 4/30/2024 Technique: Axial CT images were obtained of the abdomen and pelvis following the uneventful intravenous administration of 85 cc Isovue-300 IV contrast . Reconstructed coronal and sagittal images were also obtained. Automated exposure control and iterative construction methods were used. FINDINGS: Lung bases: No masses. No consolidation. Liver:No masses. No intrahepatic biliary ductal dilatation. Spleen:No masses. No perisplenic hematoma. Pancreas: Pancreatic morphologically normal pancreatic tail. No associated mass. Gallbladder and common bile duct:No evidence of cholelithiasis. No evidence of cholecystitis. Adrenal glands:No adrenal masses Kidneys and ureters:No kidney stones. No renal masses.No calculi present within the ureters. Normal caliber ureters. Urinary bladder:No urinary bladder wall thickening. No bladder masses. Small bowel:Normal caliber small bowel. Large bowel: Colonic diverticulosis without evidence of diverticulitis. Appendix: Normal GENITOURINARY: Normal prostate Ascites or pneumoperitoneum:None. Adenopathy:None present Osseous structures: The proximal femurs are intact. The pubic bones are intact. Mild degenerative changes of the hips and lumbar spine. The ribs are intact. Other findings: None     Impression: Inflammation of the  pancreas predominantly involving the pancreatic tail pancreatitis. Electronically Signed: Tejas Ramirez MD  11/26/2024 7:38 PM EST  Workstation ID: [id number]         Assessment & Plan   Assessment & Plan       Type 2 diabetes mellitus    Hypertriglyceridemia    Alcohol abuse    GERD without esophagitis    Acute recurrent pancreatitis    Polysubstance abuse    Tobacco use      44 year old male presents to the ED with worsening abdominal pain consistent with his prior history of pancreatitis.    1) Acute recurrent pancreatitis       ETOH use      Hypertriglyceridemia   -CT abdomen pelvis as mentioned above  - Reports last alcohol use was over the weekend  - Lipid panel pending  - Has not had Tricor since May 2024  - IV fluids  - Pain control  - Ethanol level less than 10  - CIWA    2) diabetes mellitus type 2 with hyperglycemia  - Glucose tonight 525  - Was given 6 units of regular insulin and repeat glucose 150  - Hemoglobin A1c 10.9  - Patient has not had his metformin since May 2024  - Diabetes education provided  - Will consult diabetes educator  - Anion gap 11, beta hydroxybutyrate 0.154  - Continue IV fluids, fingersticks before meals and at bedtime  - Start sliding scale insulin    3) polysubstance abuse  -UDS positive for cocaine  - Cessation education provided    4) tobacco use  - Smokes approximately 1 pack/day  - Nicotine patch  - Cessation education provided    5) Noncompliance  - Complicates all aspects of care           DVT prophylaxis:  scds    CODE STATUS:  Full Code        Expected Discharge  TBD     This note has been completed as part of a split-shared workflow.     Signature: Electronically signed by JIM Fernandez, 11/26/24, 9:33 PM EST.

## 2024-11-27 NOTE — CONSULTS
Clinical Nutrition     Nutrition Education   Reason for Visit:   APRN/PA Consult      Patient Name: Yg Araujo  YOB: 1980  MRN: [id number]  Date of Encounter: 11/27/24 16:47 EST  Admission date: 11/26/2024      Applicable Diagnoses     Type 2 DM  Hypertriglyceridemia    Diet/Nutrition Related History:     Patient reports that he typically doesn't eat breakfast as he wakes up later but does eat lunch and dinner. A typical lunch would be a burger or steak and the same for dinner, notes that he prefers beef over other meats. Patient states that he steams vegetables on the side but adds salt and butter. Pt notes that he also drinks a lot of milk.     Labs reviewed   Yes    Nutrition Diagnosis     Problem Food and nutrition knowledge deficit   Related To Diet for type 2 DM and high triglycerides   Signs/Symptoms Pt requested education     Goal:     Increase knowledge on diet/nutrition effects on condition/status     Nutrition Intervention     Education provided regarding nutrition therapy for: Diet rationale, Key food habit change, Consistent Carbohydrate Diet, Type 2 Diabetes, and Heart Healthy eating    Education provided regarding food habits/behavior related to:Food choices, Eating pattern, Label reading, Seasoning food, Food preparation, Food shopping, and Meal planning     RD provided printed material via Academy [employer] Dietetics-Nutrition Care Manual  and [facility] RDN created education material      Monitoring/Evaluation:     Pt acknowledged understanding of material covered, patient asked appropriate and relevant questions.    Marilynn Fry RD  Time Spent: 20m

## 2024-11-27 NOTE — PROGRESS NOTES
[facility] Medicine Services  PROGRESS NOTE    Patient Name: Yg Araujo  : 1980  MRN: [id number]    Date of Admission: 2024  Primary Care Physician: Tang Rios MD    Subjective   Subjective     CC:  F/U pancreatitis     HPI:  Patient seen and examined. Feels better. Would like to try some food. Says he has been out of medication for several months.     Objective   Objective     Vital Signs:   Temp:  [97.6 °F (36.4 °C)-98.2 °F (36.8 °C)] 97.8 °F (36.6 °C)  Heart Rate:  [72-91] 84  Resp:  [14-18] 14  BP: (102-129)/(64-90) 103/64     Physical Exam:  Constitutional: No acute distress, awake, alert  HENT: NCAT, mucous membranes moist  Respiratory: Clear to auscultation bilaterally, respiratory effort normal   Cardiovascular: RRR, no murmurs, rubs, or gallops  Gastrointestinal: Positive bowel sounds, soft, nontender, nondistended  Musculoskeletal: No bilateral ankle edema  Psychiatric: Appropriate affect, cooperative  Neurologic: Oriented x 3, RUIZ, speech clear  Skin: No rashes     Results Reviewed:  LAB RESULTS:      Lab 24  0550 24   WBC 6.64  --  7.00   HEMOGLOBIN 14.6  --  15.7   HEMATOCRIT 44.5  --  46.8   PLATELETS 221  --  271   NEUTROS ABS 2.81  --  3.67   IMMATURE GRANS (ABS) 0.04  --  0.06*   LYMPHS ABS 2.93  --  2.63   MONOS ABS 0.59  --  0.46   EOS ABS 0.23  --  0.15   MCV 87.4  --  86.2   LACTATE  --  1.0 2.5*   HSTROP T  --   --  15         Lab 24  0550 24  0114 24  181   SODIUM 139 136 135*  --  129*   POTASSIUM  --   --  4.2  --  4.4   CHLORIDE  --   --  100  --  93*   CO2  --   --  24.0  --  25.0   ANION GAP  --   --  11.0  --  11.0   BUN  --   --  13  --  13   CREATININE  --   --  0.83 0.90 0.90   EGFR  --   --  111.4  --  108.7   GLUCOSE  --   --  184*  --  525*   CALCIUM  --   --  9.1  --  9.4   MAGNESIUM 1.9  --  2.0  --   --    PHOSPHORUS 3.7  --  4.1  --   --     HEMOGLOBIN A1C  --   --   --   --  10.90*         Lab 11/26/24 1814   TOTAL PROTEIN 7.2   ALBUMIN 4.2   GLOBULIN 3.0   ALT (SGPT) 10   AST (SGOT) 19   BILIRUBIN 0.6   ALK PHOS 88   LIPASE 96*         Lab 11/26/24 1814   PROBNP <36.0   HSTROP T 15         Lab 11/26/24  2257 11/26/24 1814   CHOLESTEROL  --  329*   LDL CHOL  --  94   HDL CHOL  --  29*   TRIGLYCERIDES 1,109* 1,019*             Brief Urine Lab Results  (Last result in the past 365 days)        Color   Clarity   Blood   Leuk Est   Nitrite   Protein   CREAT   Urine HCG        11/26/24 1816 Yellow   Clear   Negative   Negative   Negative   Negative                   Microbiology Results Abnormal       None            CT Abdomen Pelvis With Contrast    Result Date: 11/26/2024  CT ABDOMEN PELVIS W CONTRAST Date of Exam: 11/26/2024 7:12 PM EST Indication: left abd pain. hx of pancreatitis.. Comparison: 4/30/2024 Technique: Axial CT images were obtained of the abdomen and pelvis following the uneventful intravenous administration of 85 cc Isovue-300 IV contrast . Reconstructed coronal and sagittal images were also obtained. Automated exposure control and iterative construction methods were used. FINDINGS: Lung bases: No masses. No consolidation. Liver:No masses. No intrahepatic biliary ductal dilatation. Spleen:No masses. No perisplenic hematoma. Pancreas: Pancreatic morphologically normal pancreatic tail. No associated mass. Gallbladder and common bile duct:No evidence of cholelithiasis. No evidence of cholecystitis. Adrenal glands:No adrenal masses Kidneys and ureters:No kidney stones. No renal masses.No calculi present within the ureters. Normal caliber ureters. Urinary bladder:No urinary bladder wall thickening. No bladder masses. Small bowel:Normal caliber small bowel. Large bowel: Colonic diverticulosis without evidence of diverticulitis. Appendix: Normal GENITOURINARY: Normal prostate Ascites or pneumoperitoneum:None. Adenopathy:None present Osseous  structures: The proximal femurs are intact. The pubic bones are intact. Mild degenerative changes of the hips and lumbar spine. The ribs are intact. Other findings: None     Impression: Inflammation of the pancreas predominantly involving the pancreatic tail pancreatitis. Electronically Signed: Tejas Ramirez MD  11/26/2024 7:38 PM EST  Workstation ID: [id number]         Current medications:  Scheduled Meds:fenofibrate, 48 mg, Oral, Daily  nicotine, 1 patch, Transdermal, Q24H  prochlorperazine, 10 mg, Intravenous, Once  senna-docusate sodium, 2 tablet, Oral, BID      Continuous Infusions:dextrose, 1-2 mL/kg/hr, Last Rate: 1.806 mL/kg/hr (11/27/24 0907)  insulin, 0.05 Units/kg/hr, Last Rate: 0.05 Units/kg/hr (11/27/24 0649)      PRN Meds:.  acetaminophen **OR** acetaminophen **OR** acetaminophen    senna-docusate sodium **AND** polyethylene glycol **AND** bisacodyl **AND** bisacodyl    dextrose    dextrose    glucagon (human recombinant)    HYDROmorphone    nitroglycerin    ondansetron ODT **OR** ondansetron    Pharmacy Consult    [COMPLETED] Insert Peripheral IV **AND** sodium chloride    Assessment & Plan   Assessment & Plan     Active Hospital Problems    Diagnosis  POA    Pancreatitis [K85.90]  Yes    Tobacco use [Z72.0]  Yes    Polysubstance abuse [F19.10]  Yes    Acute recurrent pancreatitis [K85.90]  Yes    GERD without esophagitis [K21.9]  Yes    Alcohol abuse [F10.10]  Yes    Type 2 diabetes mellitus [E11.9]  Yes    Hypertriglyceridemia [E78.1]  Yes      Resolved Hospital Problems   No resolved problems to display.        Brief Hospital Course to date:  Yg Araujo is a 44 y.o. male presents to the ED with worsening abdominal pain consistent with his prior history of pancreatitis.     Acute recurrent pancreatitis   ETOH use  Hypertriglyceridemia   - CT abdomen pelvis as mentioned above  - Reports last alcohol use was over the weekend  - Has not had Tricor since May 2024  - IV fluids  - Pain control  -  CIWA  -- Clear liquid diet, advance as tolerated  -- DC IV insulin gtt as triglycerides now 440  -- Continue Tricor  -- GI consulted      Diabetes mellitus type 2 with hyperglycemia  - Patient has not had his metformin since May 2024  -- LDSSI + Accu-checks   -- DM Educator      Polysubstance abuse  -UDS positive for cocaine  - Cessation education provided     Tobacco use  - Smokes approximately 1 pack/day  - Nicotine patch  - Cessation education provided     Noncompliance  - Complicates all aspects of care    Expected Discharge Location and Transportation: Home  Expected Discharge   Expected Discharge Date: 11/29/2024; Expected Discharge Time:      VTE Prophylaxis:  Mechanical VTE prophylaxis orders are present.         AM-PAC 6 Clicks Score (PT): 24 (11/26/24 2153)    CODE STATUS:   Code Status and Medical Interventions: CPR (Attempt to Resuscitate); Full Support   Ordered at: 11/26/24 2137     Level Of Support Discussed With:    Patient     Code Status (Patient has no pulse and is not breathing):    CPR (Attempt to Resuscitate)     Medical Interventions (Patient has pulse or is breathing):    Full Support       Suzy Skelton, DO  11/27/24

## 2024-11-27 NOTE — CASE MANAGEMENT/SOCIAL WORK
Discharge Planning Assessment  [facility]     Patient Name: Yg Araujo  MRN: [id number]  Today's Date: 11/27/2024    Admit Date: 11/26/2024    Plan: Home at DC   Discharge Needs Assessment       Row Name 11/27/24 1036       Living Environment    People in Home alone    Current Living Arrangements home       Discharge Needs Assessment    Equipment Currently Used at Home none    Equipment Needed After Discharge none    Discharge Coordination/Progress Denies current use of DME, HH or outpt services. Diabetes educator has been consulted.                   Discharge Plan       Row Name 11/27/24 1037       Plan    Plan Home at DC    Patient/Family in Agreement with Plan yes    Plan Comments I spoke with the pt. He denies any DC needs at this time. He will transport himself home at DC.    Final Discharge Disposition Code 01 - home or self-care      Row Name 11/27/24 0945       Plan    Final Discharge Disposition Code 01 - home or self-care                  Continued Care and Services - Admitted Since 11/26/2024    No active coordination exists for this encounter.       Expected Discharge Date and Time       Expected Discharge Date Expected Discharge Time    Nov 29, 2024            Demographic Summary    No documentation.                  Functional Status       Row Name 11/27/24 1035       Functional Status    Usual Activity Tolerance good       Functional Status, IADL    Medications independent    Meal Preparation independent    Housekeeping independent    Laundry independent    Shopping independent                   Psychosocial    No documentation.                  Abuse/Neglect    No documentation.                  Legal    No documentation.                  Substance Abuse    No documentation.                  Patient Forms    No documentation.                     Melania Brown RN

## 2024-11-27 NOTE — CONSULTS
[facility] Gastroenterology Consult    Referring Provider:  Suzy Skelton DO     PCP: Tang Rios MD    Reason for Consultation: Pancreatitis     Chief complaint: Abdominal pain     History of present illness:    Yg Araujo is a 44 y.o. male who is admitted with acute recurrent pancreatitis.    He complains of epigastric pain radiating to his back for three days.   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   He states he ran out of his prescription for Fenofibrate 5 months ago.   He continues to drink alcohol regularly, approximately 1-2 beers nightly and heavier alcohol consumption on the weekend.   He also has history of illicit drug use, UDS is positive for Cocaine.       Allergies:  Patient has no known allergies.    Scheduled Meds:  fenofibrate, 48 mg, Oral, Daily  insulin lispro, 2-7 Units, Subcutaneous, 4x Daily AC & at Bedtime  nicotine, 1 patch, Transdermal, Q24H  senna-docusate sodium, 2 tablet, Oral, BID       Infusions:       PRN Meds:    acetaminophen **OR** acetaminophen **OR** acetaminophen    senna-docusate sodium **AND** polyethylene glycol **AND** bisacodyl **AND** bisacodyl    dextrose    dextrose    glucagon (human recombinant)    glucagon (human recombinant)    HYDROmorphone    nitroglycerin    ondansetron ODT **OR** ondansetron    [COMPLETED] Insert Peripheral IV **AND** sodium chloride    Home Meds:  Medications Prior to Admission   Medication Sig Dispense Refill Last Dose/Taking    nicotine (NICODERM CQ) 21 MG/24HR patch Place 1 patch on the skin as directed by provider Daily. 30 patch 1 Past Month    fenofibrate (TRICOR) 48 MG tablet Take 1 tablet by mouth Daily for 30 days. 30 tablet 0     metFORMIN ER (GLUCOPHAGE-XR) 500 MG 24 hr tablet Take 1 tablet by mouth Daily With Breakfast for 30 days. 30 tablet 0      ROS: Review of Systems   Constitutional: Negative.    HENT: Negative.     Eyes: Negative.   "  Respiratory: Negative.     Cardiovascular: Negative.    Gastrointestinal:  Positive for abdominal pain and nausea.   Endocrine: Negative.    Genitourinary: Negative.    Musculoskeletal: Negative.    Skin: Negative.        PAST MED HX:  Past Medical History:   Diagnosis Date    Alcohol abuse     Diverticulitis     GERD (gastroesophageal reflux disease)     HLD (hyperlipidemia)     HTN (hypertension)     Pancreatitis     Pancreatitis     Polysubstance abuse     Type 2 diabetes mellitus        PAST SURG HX:  History reviewed. No pertinent surgical history.    FAM HX:  Family History   Problem Relation Age of Onset    Hypertension Father     Heart disease Father     Diabetes Father        SOC HX:  Social History     Socioeconomic History    Marital status: Single   Tobacco Use    Smoking status: Every Day     Current packs/day: 1.50     Average packs/day: 1.5 packs/day for 29.9 years (44.9 ttl pk-yrs)     Types: Cigarettes     Start date: 1/1/1995    Smokeless tobacco: Never   Vaping Use    Vaping status: Never Used   Substance and Sexual Activity    Alcohol use: Yes     Alcohol/week: 24.0 standard drinks of alcohol     Types: 24 Cans of beer per week     Comment: 6 pack every other day, binge on weekends    Drug use: Yes     Types: Marijuana, Cocaine(coke)     Comment: daily- Hasn't done cocaine since beginning of April 2017    Sexual activity: Defer       PHYSICAL EXAM  /90 (BP Location: Right arm, Patient Position: Lying)   Pulse 77   Temp 98 °F (36.7 °C) (Oral)   Resp 14   Ht 177.8 cm (70\")   Wt 80.3 kg (177 lb)   SpO2 98%   BMI 25.40 kg/m²   Wt Readings from Last 3 Encounters:   11/26/24 80.3 kg (177 lb)   04/30/24 88.5 kg (195 lb)   11/24/23 88.5 kg (195 lb)   ,body mass index is 25.4 kg/m².  Physical Exam  Constitutional:       General: He is not in acute distress.  HENT:      Head: Normocephalic and atraumatic.   Eyes:      General: No scleral icterus.  Cardiovascular:      Rate and Rhythm: Normal " "rate and regular rhythm.   Pulmonary:      Effort: Pulmonary effort is normal. No respiratory distress.   Abdominal:      General: Bowel sounds are normal.      Palpations: Abdomen is soft.      Tenderness: There is abdominal tenderness. There is no guarding.   Skin:     General: Skin is warm and dry.   Neurological:      Mental Status: He is alert and oriented to person, place, and time.   Psychiatric:         Behavior: Behavior normal.       Results Review:   I reviewed the patient's new clinical results.    Lab Results   Component Value Date    WBC 6.64 11/27/2024    HGB 14.6 11/27/2024    HGB 15.7 11/26/2024    HGB 14.2 05/01/2024    HCT 44.5 11/27/2024    MCV 87.4 11/27/2024     11/27/2024       No results found for: \"INR\"    Lab Results   Component Value Date    GLUCOSE 105 (H) 11/27/2024    BUN 13 11/27/2024    CREATININE 0.63 (L) 11/27/2024    EGFRIFAFRI 102 09/17/2017    BCR 20.6 11/27/2024     11/27/2024     11/27/2024    K 4.1 11/27/2024    CO2 24.0 11/27/2024    CALCIUM 9.1 11/27/2024    ALBUMIN 4.2 11/26/2024    ALKPHOS 88 11/26/2024    BILITOT 0.6 11/26/2024    ALT 10 11/26/2024    AST 19 11/26/2024      Latest Reference Range & Units 11/26/24 18:14   Lipase 13 - 60 U/L 96 (H)      Latest Reference Range & Units 11/26/24 18:14 11/26/24 22:57 11/27/24 11:30   Triglycerides 0 - 150 mg/dL 1,019 (H) 1,109 (H) 440 (H)     CT Abdomen/Pelvis (as interpreted by radiologist)  FINDINGS:   Lung bases: No masses. No consolidation.   Liver:No masses. No intrahepatic biliary ductal dilatation.   Spleen:No masses. No perisplenic hematoma.   Pancreas: Pancreatic morphologically normal pancreatic tail. No associated mass.   Gallbladder and common bile duct:No evidence of cholelithiasis. No evidence of cholecystitis.   Adrenal glands:No adrenal masses   Kidneys and ureters:No kidney stones. No renal masses.No calculi present within the ureters. Normal caliber ureters.   Urinary bladder:No urinary " bladder wall thickening. No bladder masses.   Small bowel:Normal caliber small bowel.   Large bowel: Colonic diverticulosis without evidence of diverticulitis.   Appendix: Normal   GENITOURINARY: Normal prostate   Ascites or pneumoperitoneum:None   Adenopathy:None present   Osseous structures: The proximal femurs are intact. The pubic bones are intact. Mild degenerative changes of the hips and lumbar spine. The ribs are intact.   Other findings: None   IMPRESSION:  Inflammation of the pancreas predominantly involving the pancreatic tail pancreatitis.    Urine drug screen:    Latest Reference Range & Units 11/26/24 18:16   Amphetamine, Urine Qual Negative  Negative   Barbiturates Screen, Urine Negative  Negative   Benzodiazepine Screen, Urine Negative  Negative   Buprenorphine, Screen, Urine Negative  Negative   Cocaine Screen, Urine Negative  Positive !   Fentanyl, Urine Negative  Negative   Methamphetamine, Ur Negative  Negative   Methadone Screen , Urine Negative  Negative   Opiate Screen Negative  Negative   Oxycodone Screen, Urine Negative  Negative   Phencyclidine (PCP), Urine Negative  Negative   THC Screen, Urine Negative  Negative   Tricyclic Antidepressants Screen Negative  Negative     ASSESSMENTS/PLANS    Acute pancreatitis, recurrent.   Suspect hypertriglyceridemia induced.     Hypertrigylceridemia, severe.  Patient ran out of his Tricor prescription five months ago.   Illicit drug use, Cocaine  Alcohol abuse     >> Treatment of pancreatitis is supportive.   Continue current care.  >> Patient received IV Insulin drip overnight and triglycerides have improved to 440.     >> Resume Fenofibrate.  Recommend prescription as meds  to bed at discharge to ensure patient receives this.    Reiterated importance of compliance in medications and risk of recurrent pancreatitis.      >> Follow up with PCP outpatient in 1 week to see if additional therapies are needed to treat severe hypertriglyceridemia  >>  Discontinue alcohol use.  We discussed this at length and patient voiced understanding.     >> Discontinue cocaine use.      >> Consult registered dietitian for education regarding a low triglyceride diet.  Patient voices a heavy consumption of red meats with eating steak 4-5 times per week.       Will sign off.   Please call for any questions or concerns.     I discussed the patient's findings and my recommendations with patient    RICCARDO Beltran  11/27/24  12:47 EST

## 2024-11-27 NOTE — PAYOR COMM NOTE
"Mariana Barrios (44 y.o. Male)       Date of Birth   1980    Social Security Number       Address   [address] [place] [address]    Home Phone   [phone]    MRN   [id number]       Jew   Syn[employer]    Marital Status   Single                            Admission Date   24    Admission Type   Emergency    Admitting Provider   Suzy Skelton DO    Attending Provider   Suzy Skelton DO    Department, Room/Bed   [facility] 5F, S510/1       Discharge Date       Discharge Disposition       Discharge Destination                                 Attending Provider: Suzy Skelton DO    Allergies: No Known Allergies    Isolation: None   Infection: None   Code Status: CPR    Ht: 177.8 cm (70\")   Wt: 80.3 kg (177 lb)    Admission Cmt: None   Principal Problem: None                  Active Insurance as of 2024       Primary Coverage       Payor Plan Insurance Group Employer/Plan Group    [facility] [id number]       Payor Plan Address Payor Plan Phone Number Payor Plan Fax Number Effective Dates    [address] [phone]  2024 - None Entered    [place] [id number]         Subscriber Name Subscriber Birth Date Member ID       MARIANA BARRIOS 1980 [id number]                     Emergency Contacts        (Rel.) Home Phone Work Phone Mobile Phone    Roslyn Waters (Mother) [phone] -- [phone]    Francoise Paez (Sister) [phone] -- [phone]                 History & Physical        Loulou Ho APRN at 24 2118       Attestation signed by Hakeem Momin MD at 24 0126    I have reviewed this documentation and agree.    Insulin drip started for hypertriglyceridemic pancreatitis                    [facility] Medicine Services  HISTORY AND PHYSICAL    Patient Name: Mariana Barrios  : 1980  MRN: [id number]  Primary Care Physician: Tang Rios, " "MD  Date of admission: 11/26/2024    Subjective  Subjective     Chief Complaint:  Abdominal pain    HPI:  Yg Araujo is a 44 y.o. male with past medical history significant for alcohol pancreatitis, hypertriglyceridemia, polysubstance use, tobacco use and diabetes mellitus type 2 presents to the ED with complaints of abdominal pain with radiation into his back that has progressively worsened over the past 24/48 hours.  He does report associated nausea and vomiting.  He denies any fever, chills, cough, chest pain, dizziness, dysuria, or melena.  He does report some shortness of breath.  Workup in the ED tonight included CT abdomen and pelvis that shows inflammation of the pancreas predominantly involving the pancreatic tail pancreatitis.  UDS obtained tonight is positive for cocaine use.  Patient tells me that he \"partied too hard over the weekend.\"  His last alcohol use was this past weekend.  Patient additionally reports he has not had his metformin or Tricor since his last admission at [facility] in May 2024.          Review of Systems   Constitutional:  Positive for appetite change. Negative for activity change, chills, diaphoresis, fatigue, fever and unexpected weight change.   HENT: Negative.     Eyes:  Negative for photophobia and visual disturbance.   Respiratory:  Positive for shortness of breath. Negative for cough.    Cardiovascular:  Negative for chest pain, palpitations and leg swelling.   Gastrointestinal:  Positive for abdominal pain, nausea and vomiting. Negative for abdominal distention, anal bleeding, blood in stool, constipation and diarrhea.   Genitourinary:  Negative for difficulty urinating, dysuria, flank pain, frequency and hematuria.   Musculoskeletal:  Positive for back pain. Negative for neck pain and neck stiffness.   Skin: Negative.    Neurological:  Negative for dizziness, speech difficulty, weakness, light-headedness, numbness and headaches.   Psychiatric/Behavioral: " Negative.                  Personal History     Past Medical History:   Diagnosis Date    Alcohol abuse     Diverticulitis     GERD (gastroesophageal reflux disease)     HLD (hyperlipidemia)     HTN (hypertension)     Pancreatitis     Pancreatitis     Polysubstance abuse     Type 2 diabetes mellitus              History reviewed. No pertinent surgical history.    Family History:  family history includes Diabetes in his father; Heart disease in his father; Hypertension in his father.     Social History:  reports that he has been smoking cigarettes. He started smoking about 29 years ago. He has a 44.9 pack-year smoking history. He has never used smokeless tobacco. He reports current alcohol use of about 24.0 standard drinks of alcohol per week. He reports current drug use. Drugs: Marijuana and Cocaine(coke).  Social History     Social History Narrative    Not on file       Medications:  fenofibrate, metFORMIN ER, and nicotine    No Known Allergies    Objective  Objective     Vital Signs:   Temp:  [98.2 °F (36.8 °C)] 98.2 °F (36.8 °C)  Heart Rate:  [72-91] 72  Resp:  [16] 16  BP: (129)/(88) 129/88    Physical Exam  Vitals and nursing note reviewed.   Constitutional:       General: He is not in acute distress.     Appearance: Normal appearance. He is not ill-appearing, toxic-appearing or diaphoretic.   HENT:      Head: Normocephalic.      Nose: Nose normal.      Mouth/Throat:      Mouth: Mucous membranes are dry.      Pharynx: Oropharynx is clear.   Eyes:      Extraocular Movements: Extraocular movements intact.      Conjunctiva/sclera: Conjunctivae normal.      Pupils: Pupils are equal, round, and reactive to light.   Cardiovascular:      Rate and Rhythm: Normal rate and regular rhythm.      Pulses: Normal pulses.      Heart sounds: Normal heart sounds.   Pulmonary:      Effort: Pulmonary effort is normal.      Breath sounds: Wheezing present.      Comments: Mild wheezing to RLL   Abdominal:      General: Bowel sounds  are normal. There is no distension.      Palpations: Abdomen is soft. There is no mass.      Tenderness: There is abdominal tenderness. There is no right CVA tenderness, left CVA tenderness, guarding or rebound.      Hernia: No hernia is present.      Comments: Epigastric tenderness    Musculoskeletal:         General: No swelling, tenderness, deformity or signs of injury. Normal range of motion.      Cervical back: Normal range of motion and neck supple.      Right lower leg: No edema.      Left lower leg: No edema.   Skin:     General: Skin is warm and dry.   Neurological:      General: No focal deficit present.      Mental Status: He is alert and oriented to person, place, and time.   Psychiatric:         Mood and Affect: Mood normal.         Behavior: Behavior normal.         Thought Content: Thought content normal.            Result Review:  I have personally reviewed the results from the time of this admission to 11/26/2024 21:19 EST and agree with these findings:  [x]  Laboratory list / accordion  [x]  Microbiology  [x]  Radiology  [x]  EKG/Telemetry   []  Cardiology/Vascular   []  Pathology  []  Old records  []  Other:  Most notable findings include:     LAB RESULTS:      Lab 11/26/24  1814   WBC 7.00   HEMOGLOBIN 15.7   HEMATOCRIT 46.8   PLATELETS 271   NEUTROS ABS 3.67   IMMATURE GRANS (ABS) 0.06*   LYMPHS ABS 2.63   MONOS ABS 0.46   EOS ABS 0.15   MCV 86.2   LACTATE 2.5*         Lab 11/26/24  1821 11/26/24  1814   SODIUM  --  129*   POTASSIUM  --  4.4   CHLORIDE  --  93*   CO2  --  25.0   ANION GAP  --  11.0   BUN  --  13   CREATININE 0.90 0.90   EGFR  --  108.7   GLUCOSE  --  525*   CALCIUM  --  9.4   HEMOGLOBIN A1C  --  10.90*         Lab 11/26/24  1814   TOTAL PROTEIN 7.2   ALBUMIN 4.2   GLOBULIN 3.0   ALT (SGPT) 10   AST (SGOT) 19   BILIRUBIN 0.6   ALK PHOS 88   LIPASE 96*         Lab 11/26/24  1814   PROBNP <36.0   HSTROP T 15                 Brief Urine Lab Results  (Last result in the past 365  days)        Color   Clarity   Blood   Leuk Est   Nitrite   Protein   CREAT   Urine HCG        11/26/24 1816 Yellow   Clear   Negative   Negative   Negative   Negative                 Microbiology Results (last 10 days)       ** No results found for the last 240 hours. **            CT Abdomen Pelvis With Contrast    Result Date: 11/26/2024  CT ABDOMEN PELVIS W CONTRAST Date of Exam: 11/26/2024 7:12 PM EST Indication: left abd pain. hx of pancreatitis.. Comparison: 4/30/2024 Technique: Axial CT images were obtained of the abdomen and pelvis following the uneventful intravenous administration of 85 cc Isovue-300 IV contrast . Reconstructed coronal and sagittal images were also obtained. Automated exposure control and iterative construction methods were used. FINDINGS: Lung bases: No masses. No consolidation. Liver:No masses. No intrahepatic biliary ductal dilatation. Spleen:No masses. No perisplenic hematoma. Pancreas: Pancreatic morphologically normal pancreatic tail. No associated mass. Gallbladder and common bile duct:No evidence of cholelithiasis. No evidence of cholecystitis. Adrenal glands:No adrenal masses Kidneys and ureters:No kidney stones. No renal masses.No calculi present within the ureters. Normal caliber ureters. Urinary bladder:No urinary bladder wall thickening. No bladder masses. Small bowel:Normal caliber small bowel. Large bowel: Colonic diverticulosis without evidence of diverticulitis. Appendix: Normal GENITOURINARY: Normal prostate Ascites or pneumoperitoneum:None. Adenopathy:None present Osseous structures: The proximal femurs are intact. The pubic bones are intact. Mild degenerative changes of the hips and lumbar spine. The ribs are intact. Other findings: None     Impression: Inflammation of the pancreas predominantly involving the pancreatic tail pancreatitis. Electronically Signed: Tejas Ramirez MD  11/26/2024 7:38 PM EST  Workstation ID: [id number]         Assessment & Plan  Assessment &  Plan       Type 2 diabetes mellitus    Hypertriglyceridemia    Alcohol abuse    GERD without esophagitis    Acute recurrent pancreatitis    Polysubstance abuse    Tobacco use      44 year old male presents to the ED with worsening abdominal pain consistent with his prior history of pancreatitis.    1) Acute recurrent pancreatitis       ETOH use      Hypertriglyceridemia   -CT abdomen pelvis as mentioned above  - Reports last alcohol use was over the weekend  - Lipid panel pending  - Has not had Tricor since May 2024  - IV fluids  - Pain control  - Ethanol level less than 10  - CIWA    2) diabetes mellitus type 2 with hyperglycemia  - Glucose tonight 525  - Was given 6 units of regular insulin and repeat glucose 150  - Hemoglobin A1c 10.9  - Patient has not had his metformin since May 2024  - Diabetes education provided  - Will consult diabetes educator  - Anion gap 11, beta hydroxybutyrate 0.154  - Continue IV fluids, fingersticks before meals and at bedtime  - Start sliding scale insulin    3) polysubstance abuse  -UDS positive for cocaine  - Cessation education provided    4) tobacco use  - Smokes approximately 1 pack/day  - Nicotine patch  - Cessation education provided    5) Noncompliance  - Complicates all aspects of care           DVT prophylaxis:  scds    CODE STATUS:  Full Code        Expected Discharge  TBD     This note has been completed as part of a split-shared workflow.     Signature: Electronically signed by JIM Fernandez, 11/26/24, 9:33 PM EST.                  Electronically signed by Hakeem Momin MD at 11/27/24 0126       Facility-Administered Medications as of 11/27/2024   Medication Dose Route Frequency Provider Last Rate Last Admin    acetaminophen (TYLENOL) tablet 650 mg  650 mg Oral Q4H PRN Loulou Ho APRN        Or    acetaminophen (TYLENOL) 160 MG/5ML oral solution 650 mg  650 mg Oral Q4H PRN Loulou Ho APRRAMYA        Or    acetaminophen (TYLENOL) suppository 650 mg   650 mg Rectal Q4H PRN Vic, Loulou, APRN        sennosides-docusate (PERICOLACE) 8.6-50 MG per tablet 2 tablet  2 tablet Oral BID Ivc, Loulou, APRN   2 tablet at 11/27/24 0803    And    polyethylene glycol (MIRALAX) packet 17 g  17 g Oral Daily PRN Vic, Loulou, APRN        And    bisacodyl (DULCOLAX) EC tablet 5 mg  5 mg Oral Daily PRN Vic, Loulou, APRN        And    bisacodyl (DULCOLAX) suppository 10 mg  10 mg Rectal Daily PRN Vic, Loulou, APRN        dextrose (D50W) (25 g/50 mL) IV injection 25 g  25 g Intravenous Q15 Min PRN Vic, Loulou, APRN        dextrose (GLUTOSE) oral gel 15 g  15 g Oral Q15 Min PRN Vic, Loulou, APRN        dextrose 10 % infusion  1-2 mL/kg/hr Intravenous Titrated Suzy Skelton  mL/hr at 11/27/24 0815 1.494 mL/kg/hr at 11/27/24 0815    fenofibrate (TRICOR) tablet 48 mg  48 mg Oral Daily Vic, Loulou, APRN   48 mg at 11/27/24 0803    glucagon (GLUCAGEN) injection 1 mg  1 mg Intramuscular Q15 Min PRN Vic, Loulou, APRN        [COMPLETED] HYDROmorphone (DILAUDID) injection 0.5 mg  0.5 mg Intravenous Once Jay Gregg MD   0.5 mg at 11/26/24 2001    HYDROmorphone (DILAUDID) injection 0.5 mg  0.5 mg Intravenous Q3H PRN Vic, Loulou, APRN   0.5 mg at 11/27/24 0813    [COMPLETED] insulin regular (humuLIN R,novoLIN R) injection 6 Units  6 Units Intravenous Once Hakeem Nguyen APRN   6 Units at 11/26/24 2025    insulin regular 1 unit/mL in 0.9% sodium chloride (HYPERTRIGLYCERIDEMIA) - NOT FOR USE WITH GLUCOMMANDER  0.05 Units/kg/hr Intravenous Continuous Vic, Loulou, APRN 4.02 mL/hr at 11/27/24 0649 0.05 Units/kg/hr at 11/27/24 0649    [COMPLETED] iopamidol (ISOVUE-300) 61 % injection 100 mL  100 mL Intravenous Once in imaging Jay Gregg MD   85 mL at 11/26/24 1916    nicotine (NICODERM CQ) 21 MG/24HR patch 1 patch  1 patch Transdermal Q24H Loulou Ho APRN   1 patch at 11/27/24 0802    nitroglycerin (NITROSTAT)  SL tablet 0.4 mg  0.4 mg Sublingual Q5 Min PRN Elen Hoa APRN        [COMPLETED] ondansetron (ZOFRAN) injection 4 mg  4 mg Intravenous Once Hakeem Nguyen APRN   4 mg at 24    ondansetron ODT (ZOFRAN-ODT) disintegrating tablet 4 mg  4 mg Oral Q6H PRN Vic, Loulou APRN        Or    ondansetron (ZOFRAN) injection 4 mg  4 mg Intravenous Q6H PRN Vic, Loulou, APRN        Pharmacy Consult  1 each Not Applicable PRN Vic, Loulou, APRN        prochlorperazine (COMPAZINE) injection 10 mg  10 mg Intravenous Once Jay Gregg MD        [COMPLETED] sodium chloride 0.9 % bolus 1,000 mL  1,000 mL Intravenous Once Hakeem Nguyen APRN   Stopped at 24    sodium chloride 0.9 % flush 10 mL  10 mL Intravenous PRN Hakeem Nguyen APRN        [] sodium chloride 0.9 % infusion  100 mL/hr Intravenous Continuous Loulou Ho APRN   Stopped at 24 0648

## 2024-11-27 NOTE — PLAN OF CARE
Goal Outcome Evaluation:      Patient had opal cute events during shift  A&Ox4, RA, NSR  Insulin - see MAR  D10 - titrated per order  BSG - see results

## 2024-11-28 LAB
ALBUMIN SERPL-MCNC: 3.6 G/DL (ref 3.5–5.2)
ALBUMIN/GLOB SERPL: 1.4 G/DL
ALP SERPL-CCNC: 70 U/L (ref 39–117)
ALT SERPL W P-5'-P-CCNC: 9 U/L (ref 1–41)
ANION GAP SERPL CALCULATED.3IONS-SCNC: 10 MMOL/L (ref 5–15)
AST SERPL-CCNC: 15 U/L (ref 1–40)
BILIRUB SERPL-MCNC: 0.6 MG/DL (ref 0–1.2)
BUN SERPL-MCNC: 12 MG/DL (ref 6–20)
BUN/CREAT SERPL: 16.4 (ref 7–25)
CALCIUM SPEC-SCNC: 8.8 MG/DL (ref 8.6–10.5)
CHLORIDE SERPL-SCNC: 100 MMOL/L (ref 98–107)
CO2 SERPL-SCNC: 24 MMOL/L (ref 22–29)
CREAT SERPL-MCNC: 0.73 MG/DL (ref 0.76–1.27)
DEPRECATED RDW RBC AUTO: 40.8 FL (ref 37–54)
EGFRCR SERPLBLD CKD-EPI 2021: 115.8 ML/MIN/1.73
ERYTHROCYTE [DISTWIDTH] IN BLOOD BY AUTOMATED COUNT: 12.6 % (ref 12.3–15.4)
GLOBULIN UR ELPH-MCNC: 2.5 GM/DL
GLUCOSE BLDC GLUCOMTR-MCNC: 197 MG/DL (ref 70–130)
GLUCOSE BLDC GLUCOMTR-MCNC: 208 MG/DL (ref 70–130)
GLUCOSE BLDC GLUCOMTR-MCNC: 226 MG/DL (ref 70–130)
GLUCOSE BLDC GLUCOMTR-MCNC: 243 MG/DL (ref 70–130)
GLUCOSE BLDC GLUCOMTR-MCNC: 257 MG/DL (ref 70–130)
GLUCOSE SERPL-MCNC: 253 MG/DL (ref 65–99)
HCT VFR BLD AUTO: 44.8 % (ref 37.5–51)
HGB BLD-MCNC: 14.8 G/DL (ref 13–17.7)
MCH RBC QN AUTO: 29.1 PG (ref 26.6–33)
MCHC RBC AUTO-ENTMCNC: 33 G/DL (ref 31.5–35.7)
MCV RBC AUTO: 88 FL (ref 79–97)
PLATELET # BLD AUTO: 223 10*3/MM3 (ref 140–450)
PMV BLD AUTO: 10.8 FL (ref 6–12)
POTASSIUM SERPL-SCNC: 4.4 MMOL/L (ref 3.5–5.2)
PROT SERPL-MCNC: 6.1 G/DL (ref 6–8.5)
RBC # BLD AUTO: 5.09 10*6/MM3 (ref 4.14–5.8)
SODIUM SERPL-SCNC: 134 MMOL/L (ref 136–145)
WBC NRBC COR # BLD AUTO: 7.18 10*3/MM3 (ref 3.4–10.8)

## 2024-11-28 PROCEDURE — 82948 REAGENT STRIP/BLOOD GLUCOSE: CPT

## 2024-11-28 PROCEDURE — 85027 COMPLETE CBC AUTOMATED: CPT | Performed by: FAMILY MEDICINE

## 2024-11-28 PROCEDURE — 63710000001 INSULIN GLARGINE PER 5 UNITS: Performed by: FAMILY MEDICINE

## 2024-11-28 PROCEDURE — 99232 SBSQ HOSP IP/OBS MODERATE 35: CPT | Performed by: FAMILY MEDICINE

## 2024-11-28 PROCEDURE — 25010000002 HYDROMORPHONE PER 4 MG: Performed by: NURSE PRACTITIONER

## 2024-11-28 PROCEDURE — 80053 COMPREHEN METABOLIC PANEL: CPT | Performed by: FAMILY MEDICINE

## 2024-11-28 PROCEDURE — 63710000001 INSULIN LISPRO (HUMAN) PER 5 UNITS: Performed by: FAMILY MEDICINE

## 2024-11-28 RX ORDER — INSULIN LISPRO 100 [IU]/ML
2-9 INJECTION, SOLUTION INTRAVENOUS; SUBCUTANEOUS
Status: DISCONTINUED | OUTPATIENT
Start: 2024-11-28 | End: 2024-11-29 | Stop reason: HOSPADM

## 2024-11-28 RX ORDER — OXYCODONE HYDROCHLORIDE 5 MG/1
5 TABLET ORAL EVERY 4 HOURS PRN
Status: DISCONTINUED | OUTPATIENT
Start: 2024-11-28 | End: 2024-11-29 | Stop reason: HOSPADM

## 2024-11-28 RX ADMIN — OXYCODONE 5 MG: 5 TABLET ORAL at 12:09

## 2024-11-28 RX ADMIN — HYDROMORPHONE HYDROCHLORIDE 0.5 MG: 1 INJECTION, SOLUTION INTRAMUSCULAR; INTRAVENOUS; SUBCUTANEOUS at 21:04

## 2024-11-28 RX ADMIN — HYDROMORPHONE HYDROCHLORIDE 0.5 MG: 1 INJECTION, SOLUTION INTRAMUSCULAR; INTRAVENOUS; SUBCUTANEOUS at 05:49

## 2024-11-28 RX ADMIN — HYDROMORPHONE HYDROCHLORIDE 0.5 MG: 1 INJECTION, SOLUTION INTRAMUSCULAR; INTRAVENOUS; SUBCUTANEOUS at 09:55

## 2024-11-28 RX ADMIN — INSULIN LISPRO 2 UNITS: 100 INJECTION, SOLUTION INTRAVENOUS; SUBCUTANEOUS at 12:06

## 2024-11-28 RX ADMIN — HYDROMORPHONE HYDROCHLORIDE 0.5 MG: 1 INJECTION, SOLUTION INTRAMUSCULAR; INTRAVENOUS; SUBCUTANEOUS at 17:34

## 2024-11-28 RX ADMIN — INSULIN LISPRO 4 UNITS: 100 INJECTION, SOLUTION INTRAVENOUS; SUBCUTANEOUS at 17:14

## 2024-11-28 RX ADMIN — SENNOSIDES AND DOCUSATE SODIUM 2 TABLET: 50; 8.6 TABLET ORAL at 21:04

## 2024-11-28 RX ADMIN — INSULIN GLARGINE 10 UNITS: 100 INJECTION, SOLUTION SUBCUTANEOUS at 21:05

## 2024-11-28 RX ADMIN — INSULIN LISPRO 6 UNITS: 100 INJECTION, SOLUTION INTRAVENOUS; SUBCUTANEOUS at 21:05

## 2024-11-28 RX ADMIN — FENOFIBRATE 48 MG: 48 TABLET ORAL at 09:47

## 2024-11-28 RX ADMIN — SENNOSIDES AND DOCUSATE SODIUM 2 TABLET: 50; 8.6 TABLET ORAL at 09:47

## 2024-11-28 RX ADMIN — INSULIN LISPRO 3 UNITS: 100 INJECTION, SOLUTION INTRAVENOUS; SUBCUTANEOUS at 09:47

## 2024-11-28 NOTE — PLAN OF CARE
Problem: Adult Inpatient Plan of Care  Goal: Absence of Hospital-Acquired Illness or Injury  Intervention: Identify and Manage Fall Risk  Recent Flowsheet Documentation  Taken 11/28/2024 0449 by Liliane Tejeda, RN  Safety Promotion/Fall Prevention:   activity supervised   assistive device/personal items within reach   clutter free environment maintained   fall prevention program maintained   lighting adjusted   nonskid shoes/slippers when out of bed   safety round/check completed   room organization consistent  Taken 11/28/2024 0249 by Liliane Tejeda, RN  Safety Promotion/Fall Prevention:   activity supervised   assistive device/personal items within reach   clutter free environment maintained   fall prevention program maintained   lighting adjusted   nonskid shoes/slippers when out of bed   room organization consistent   safety round/check completed  Taken 11/28/2024 0049 by Liliane Tejeda, RN  Safety Promotion/Fall Prevention:   activity supervised   assistive device/personal items within reach   clutter free environment maintained   fall prevention program maintained   lighting adjusted   nonskid shoes/slippers when out of bed   room organization consistent   safety round/check completed  Taken 11/27/2024 2249 by Liliane Tejeda, RN  Safety Promotion/Fall Prevention:   activity supervised   assistive device/personal items within reach   clutter free environment maintained   fall prevention program maintained   lighting adjusted   nonskid shoes/slippers when out of bed   room organization consistent   safety round/check completed  Taken 11/27/2024 2049 by Liliane Tejeda, RN  Safety Promotion/Fall Prevention:   activity supervised   assistive device/personal items within reach   clutter free environment maintained   fall prevention program maintained   lighting adjusted   nonskid shoes/slippers when out of bed   room organization consistent   safety round/check completed  Intervention: Prevent Skin  Injury  Recent Flowsheet Documentation  Taken 11/28/2024 0449 by Liliane Tejeda RN  Body Position: position changed independently  Taken 11/28/2024 0249 by Liliane Tejeda RN  Body Position: position changed independently  Taken 11/28/2024 0049 by Liliane Tejeda RN  Body Position: position changed independently  Taken 11/27/2024 2249 by Liliane Tejeda RN  Body Position: position changed independently  Taken 11/27/2024 2049 by Liliane Tejeda RN  Body Position: position changed independently  Intervention: Prevent Infection  Recent Flowsheet Documentation  Taken 11/28/2024 0449 by Liliane Tejeda RN  Infection Prevention:   environmental surveillance performed   hand hygiene promoted   rest/sleep promoted   single patient room provided  Taken 11/28/2024 0249 by Liliane Tejeda RN  Infection Prevention:   environmental surveillance performed   hand hygiene promoted   rest/sleep promoted   single patient room provided  Taken 11/28/2024 0049 by Liliane Tejeda RN  Infection Prevention:   environmental surveillance performed   hand hygiene promoted   rest/sleep promoted   single patient room provided  Taken 11/27/2024 2249 by Liliane Tejeda RN  Infection Prevention:   environmental surveillance performed   hand hygiene promoted   rest/sleep promoted   single patient room provided  Taken 11/27/2024 2049 by Liliane Tejeda RN  Infection Prevention:   environmental surveillance performed   hand hygiene promoted   rest/sleep promoted   single patient room provided  Goal: Optimal Comfort and Wellbeing  Intervention: Provide Person-Centered Care  Recent Flowsheet Documentation  Taken 11/27/2024 2049 by Liliane Tejeda RN  Trust Relationship/Rapport:   care explained   choices provided   questions encouraged   questions answered   thoughts/feelings acknowledged   Goal Outcome Evaluation:

## 2024-11-28 NOTE — PROGRESS NOTES
[facility] Medicine Services  PROGRESS NOTE    Patient Name: Yg Araujo  : 1980  MRN: [id number]    Date of Admission: 2024  Primary Care Physician: Tang Rios MD    Subjective   Subjective     CC:  F/U pancreatitis     HPI:  Patient seen and examined. Feeling better but still having some pain with eating. Doesn't feel ready for DC today.     Objective   Objective     Vital Signs:   Temp:  [97.7 °F (36.5 °C)-98.7 °F (37.1 °C)] 98.4 °F (36.9 °C)  Heart Rate:  [77-84] 82  Resp:  [14-18] 18  BP: (110-131)/(73-90) 114/78     Physical Exam:  Constitutional: No acute distress, awake, alert  HENT: NCAT, mucous membranes moist  Respiratory: Clear to auscultation bilaterally, respiratory effort normal   Cardiovascular: RRR, no murmurs, rubs, or gallops  Gastrointestinal: Positive bowel sounds, soft, nontender, nondistended  Musculoskeletal: No bilateral ankle edema  Psychiatric: Appropriate affect, cooperative  Neurologic: Oriented x 3, RUIZ, speech clear  Skin: No rashes     Results Reviewed:  LAB RESULTS:      Lab 24  0606 24  0550 24  1814   WBC 7.18 6.64  --  7.00   HEMOGLOBIN 14.8 14.6  --  15.7   HEMATOCRIT 44.8 44.5  --  46.8   PLATELETS 223 221  --  271   NEUTROS ABS  --  2.81  --  3.67   IMMATURE GRANS (ABS)  --  0.04  --  0.06*   LYMPHS ABS  --  2.93  --  2.63   MONOS ABS  --  0.59  --  0.46   EOS ABS  --  0.23  --  0.15   MCV 88.0 87.4  --  86.2   LACTATE  --   --  1.0 2.5*   HSTROP T  --   --   --  15         Lab 24  0606 24  1130 24  0550 24  0114 24  2257 24  1821 24  1814   SODIUM 134* 136  137 139 136 135*  --  129*   POTASSIUM 4.4 4.1  --   --  4.2  --  4.4   CHLORIDE 100 101  --   --  100  --  93*   CO2 24.0 24.0  --   --  24.0  --  25.0   ANION GAP 10.0 11.0  --   --  11.0  --  11.0   BUN 12 13  --   --  13  --  13   CREATININE 0.73* 0.63*  --   --  0.83 0.90 0.90   EGFR 115.8  121.0  --   --  111.4  --  108.7   GLUCOSE 253* 105*  --   --  184*  --  525*   CALCIUM 8.8 9.1  --   --  9.1  --  9.4   MAGNESIUM  --   --  1.9  --  2.0  --   --    PHOSPHORUS  --   --  3.7  --  4.1  --   --    HEMOGLOBIN A1C  --   --   --   --   --   --  10.90*         Lab 11/28/24  0606 11/26/24  1814   TOTAL PROTEIN 6.1 7.2   ALBUMIN 3.6 4.2   GLOBULIN 2.5 3.0   ALT (SGPT) 9 10   AST (SGOT) 15 19   BILIRUBIN 0.6 0.6   ALK PHOS 70 88   LIPASE  --  96*         Lab 11/26/24  1814   PROBNP <36.0   HSTROP T 15         Lab 11/27/24  1130 11/26/24  2257 11/26/24  1814   CHOLESTEROL  --   --  329*   LDL CHOL  --   --  94   HDL CHOL  --   --  29*   TRIGLYCERIDES 440* 1,109* 1,019*             Brief Urine Lab Results  (Last result in the past 365 days)        Color   Clarity   Blood   Leuk Est   Nitrite   Protein   CREAT   Urine HCG        11/26/24 1816 Yellow   Clear   Negative   Negative   Negative   Negative                   Microbiology Results Abnormal       None            CT Abdomen Pelvis With Contrast    Result Date: 11/26/2024  CT ABDOMEN PELVIS W CONTRAST Date of Exam: 11/26/2024 7:12 PM EST Indication: left abd pain. hx of pancreatitis.. Comparison: 4/30/2024 Technique: Axial CT images were obtained of the abdomen and pelvis following the uneventful intravenous administration of 85 cc Isovue-300 IV contrast . Reconstructed coronal and sagittal images were also obtained. Automated exposure control and iterative construction methods were used. FINDINGS: Lung bases: No masses. No consolidation. Liver:No masses. No intrahepatic biliary ductal dilatation. Spleen:No masses. No perisplenic hematoma. Pancreas: Pancreatic morphologically normal pancreatic tail. No associated mass. Gallbladder and common bile duct:No evidence of cholelithiasis. No evidence of cholecystitis. Adrenal glands:No adrenal masses Kidneys and ureters:No kidney stones. No renal masses.No calculi present within the ureters. Normal caliber  ureters. Urinary bladder:No urinary bladder wall thickening. No bladder masses. Small bowel:Normal caliber small bowel. Large bowel: Colonic diverticulosis without evidence of diverticulitis. Appendix: Normal GENITOURINARY: Normal prostate Ascites or pneumoperitoneum:None. Adenopathy:None present Osseous structures: The proximal femurs are intact. The pubic bones are intact. Mild degenerative changes of the hips and lumbar spine. The ribs are intact. Other findings: None     Impression: Inflammation of the pancreas predominantly involving the pancreatic tail pancreatitis. Electronically Signed: Tejas Ramirez MD  11/26/2024 7:38 PM EST  Workstation ID: [id number]         Current medications:  Scheduled Meds:fenofibrate, 48 mg, Oral, Daily  insulin lispro, 2-7 Units, Subcutaneous, 4x Daily AC & at Bedtime  nicotine, 1 patch, Transdermal, Q24H  senna-docusate sodium, 2 tablet, Oral, BID      Continuous Infusions:     PRN Meds:.  acetaminophen **OR** acetaminophen **OR** acetaminophen    senna-docusate sodium **AND** polyethylene glycol **AND** bisacodyl **AND** bisacodyl    dextrose    dextrose    glucagon (human recombinant)    glucagon (human recombinant)    HYDROmorphone    nitroglycerin    ondansetron ODT **OR** ondansetron    [COMPLETED] Insert Peripheral IV **AND** sodium chloride    Assessment & Plan   Assessment & Plan     Active Hospital Problems    Diagnosis  POA    Pancreatitis [K85.90]  Yes    Tobacco use [Z72.0]  Yes    Polysubstance abuse [F19.10]  Yes    Acute recurrent pancreatitis [K85.90]  Yes    GERD without esophagitis [K21.9]  Yes    Alcohol abuse [F10.10]  Yes    Type 2 diabetes mellitus [E11.9]  Yes    Hypertriglyceridemia [E78.1]  Yes      Resolved Hospital Problems   No resolved problems to display.        Brief Hospital Course to date:  Yg Araujo is a 44 y.o. male presents to the ED with worsening abdominal pain consistent with his prior history of pancreatitis.    This patient's problems  and plans were partially entered by my partner and updated as appropriate by me 11/28/24.      Acute recurrent pancreatitis   ETOH use  Hypertriglyceridemia   -S/P insulin gtt for elevated triglycerides   -Continue Tricor  -Pain Control, add oral option   -Diet as tolerated      Diabetes mellitus type 2 with hyperglycemia  - Patient has not had his metformin since May 2024  -- DM Educator has seen   -- Increase SSI to Mod dose  -- Add Lantus at night, 10 units      Polysubstance abuse  -UDS positive for cocaine  - Cessation education provided     Tobacco use  - Smokes approximately 1 pack/day  - Nicotine patch  - Cessation education provided     Noncompliance  - Complicates all aspects of care    Expected Discharge Location and Transportation: Home tomorrow   Expected Discharge   Expected Discharge Date: 11/29/2024; Expected Discharge Time:      VTE Prophylaxis:  Mechanical VTE prophylaxis orders are present.         AM-PAC 6 Clicks Score (PT): 24 (11/27/24 2049)    CODE STATUS:   Code Status and Medical Interventions: CPR (Attempt to Resuscitate); Full Support   Ordered at: 11/26/24 2137     Level Of Support Discussed With:    Patient     Code Status (Patient has no pulse and is not breathing):    CPR (Attempt to Resuscitate)     Medical Interventions (Patient has pulse or is breathing):    Full Support       Suzy Skelton, DO  11/28/24

## 2024-11-28 NOTE — PLAN OF CARE
Problem: Adult Inpatient Plan of Care  Goal: Plan of Care Review  Outcome: Progressing  Goal: Patient-Specific Goal (Individualized)  Outcome: Progressing  Goal: Absence of Hospital-Acquired Illness or Injury  Outcome: Progressing  Intervention: Identify and Manage Fall Risk  Recent Flowsheet Documentation  Taken 11/28/2024 1600 by Mary Boothe RN  Safety Promotion/Fall Prevention:   clutter free environment maintained   fall prevention program maintained   nonskid shoes/slippers when out of bed   room organization consistent   safety round/check completed  Taken 11/28/2024 1400 by Mary Boothe RN  Safety Promotion/Fall Prevention:   clutter free environment maintained   fall prevention program maintained   nonskid shoes/slippers when out of bed   room organization consistent   safety round/check completed  Taken 11/28/2024 1200 by Mary Boothe RN  Safety Promotion/Fall Prevention:   clutter free environment maintained   fall prevention program maintained   nonskid shoes/slippers when out of bed   room organization consistent   safety round/check completed  Taken 11/28/2024 1000 by Mary Boothe RN  Safety Promotion/Fall Prevention:   clutter free environment maintained   fall prevention program maintained   nonskid shoes/slippers when out of bed   room organization consistent   safety round/check completed  Taken 11/28/2024 0800 by Mary Boothe RN  Safety Promotion/Fall Prevention:   clutter free environment maintained   fall prevention program maintained   nonskid shoes/slippers when out of bed   room organization consistent   safety round/check completed  Intervention: Prevent Skin Injury  Recent Flowsheet Documentation  Taken 11/28/2024 1600 by Mary Boothe RN  Body Position: position changed independently  Skin Protection:   incontinence pads utilized   transparent dressing maintained  Taken 11/28/2024 1400 by Mary Boothe RN  Body Position: position changed independently  Skin Protection:    incontinence pads utilized   transparent dressing maintained  Taken 11/28/2024 1200 by Mary Boothe RN  Body Position: position changed independently  Skin Protection:   incontinence pads utilized   transparent dressing maintained  Taken 11/28/2024 1000 by Mary Boothe RN  Body Position: position changed independently  Skin Protection:   incontinence pads utilized   transparent dressing maintained  Taken 11/28/2024 0800 by Mary Boothe RN  Body Position: position changed independently  Skin Protection:   incontinence pads utilized   transparent dressing maintained  Intervention: Prevent Infection  Recent Flowsheet Documentation  Taken 11/28/2024 1600 by Mary Boothe RN  Infection Prevention:   environmental surveillance performed   hand hygiene promoted   rest/sleep promoted   single patient room provided  Taken 11/28/2024 1400 by Mary Boothe RN  Infection Prevention:   environmental surveillance performed   hand hygiene promoted   rest/sleep promoted   single patient room provided  Taken 11/28/2024 1200 by Mary Boothe RN  Infection Prevention:   environmental surveillance performed   hand hygiene promoted   rest/sleep promoted   single patient room provided  Taken 11/28/2024 1000 by Mary Boothe RN  Infection Prevention:   environmental surveillance performed   hand hygiene promoted   rest/sleep promoted   single patient room provided  Taken 11/28/2024 0800 by Mary Boothe RN  Infection Prevention:   environmental surveillance performed   hand hygiene promoted   rest/sleep promoted   single patient room provided  Goal: Optimal Comfort and Wellbeing  Outcome: Progressing  Intervention: Provide Person-Centered Care  Recent Flowsheet Documentation  Taken 11/28/2024 1000 by Mary Boothe RN  Trust Relationship/Rapport:   care explained   emotional support provided   empathic listening provided   questions answered   reassurance provided   thoughts/feelings acknowledged  Taken 11/28/2024 0800 by  Mary Boothe RN  Trust Relationship/Rapport:   care explained   emotional support provided   empathic listening provided   questions answered   reassurance provided   thoughts/feelings acknowledged  Goal: Readiness for Transition of Care  Outcome: Progressing     Problem: Pancreatitis  Goal: Fluid and Electrolyte Balance  Outcome: Progressing  Goal: Absence of Infection Signs and Symptoms  Outcome: Progressing  Goal: Optimal Nutrition Delivery  Outcome: Progressing  Goal: Optimal Pain Control and Function  Outcome: Progressing  Intervention: Monitor and Manage Pain  Recent Flowsheet Documentation  Taken 11/28/2024 1600 by Mary Boothe RN  Sensory Stimulation Regulation: auditory stimulation minimized  Sleep/Rest Enhancement:   awakenings minimized   regular sleep/rest pattern promoted   relaxation techniques promoted  Taken 11/28/2024 1400 by Mary Boothe RN  Sensory Stimulation Regulation: auditory stimulation minimized  Sleep/Rest Enhancement:   awakenings minimized   regular sleep/rest pattern promoted   relaxation techniques promoted  Taken 11/28/2024 1200 by Mary Boothe RN  Sensory Stimulation Regulation:   auditory stimulation minimized   care clustered   quiet environment promoted  Sleep/Rest Enhancement:   awakenings minimized   regular sleep/rest pattern promoted   relaxation techniques promoted  Taken 11/28/2024 1000 by Mary Boothe RN  Sensory Stimulation Regulation: auditory stimulation minimized  Sleep/Rest Enhancement:   awakenings minimized   regular sleep/rest pattern promoted   relaxation techniques promoted   therapeutic touch utilized  Taken 11/28/2024 0800 by Mary Boothe RN  Sensory Stimulation Regulation:   auditory stimulation minimized   quiet environment promoted   visual stimulation minimized  Sleep/Rest Enhancement:   awakenings minimized   regular sleep/rest pattern promoted   relaxation techniques promoted   Goal Outcome Evaluation:      Patient still having quite a bit of  pain. Roxicodone ordered today and given per protocol see MAR. Patient didn't express needs today. No needs expressed at this time. Plan of care continues.

## 2024-11-29 ENCOUNTER — READMISSION MANAGEMENT (OUTPATIENT)
Dept: CALL CENTER | Facility: HOSPITAL | Age: 44
End: 2024-11-29
Payer: COMMERCIAL

## 2024-11-29 VITALS
HEIGHT: 70 IN | BODY MASS INDEX: 25.34 KG/M2 | TEMPERATURE: 97.7 F | DIASTOLIC BLOOD PRESSURE: 86 MMHG | HEART RATE: 68 BPM | SYSTOLIC BLOOD PRESSURE: 112 MMHG | OXYGEN SATURATION: 96 % | RESPIRATION RATE: 16 BRPM | WEIGHT: 177 LBS

## 2024-11-29 PROBLEM — K85.90 ACUTE RECURRENT PANCREATITIS: Status: RESOLVED | Noted: 2022-03-30 | Resolved: 2024-11-29

## 2024-11-29 PROBLEM — K85.90 PANCREATITIS: Status: RESOLVED | Noted: 2024-11-27 | Resolved: 2024-11-29

## 2024-11-29 LAB
GLUCOSE BLDC GLUCOMTR-MCNC: 194 MG/DL (ref 70–130)
GLUCOSE BLDC GLUCOMTR-MCNC: 208 MG/DL (ref 70–130)

## 2024-11-29 PROCEDURE — 63710000001 INSULIN LISPRO (HUMAN) PER 5 UNITS: Performed by: FAMILY MEDICINE

## 2024-11-29 PROCEDURE — 25010000002 HYDROMORPHONE PER 4 MG: Performed by: FAMILY MEDICINE

## 2024-11-29 PROCEDURE — 82948 REAGENT STRIP/BLOOD GLUCOSE: CPT

## 2024-11-29 PROCEDURE — 99239 HOSP IP/OBS DSCHRG MGMT >30: CPT | Performed by: FAMILY MEDICINE

## 2024-11-29 RX ORDER — POLYETHYLENE GLYCOL 3350 17 G/17G
17 POWDER, FOR SOLUTION ORAL DAILY PRN
Qty: 510 G | Refills: 0 | Status: SHIPPED | OUTPATIENT
Start: 2024-11-29

## 2024-11-29 RX ORDER — OXYCODONE HYDROCHLORIDE 5 MG/1
5 TABLET ORAL EVERY 12 HOURS PRN
Qty: 6 TABLET | Refills: 0 | Status: SHIPPED | OUTPATIENT
Start: 2024-11-29

## 2024-11-29 RX ORDER — LANCETS 30 GAUGE
1 EACH MISCELLANEOUS DAILY
Qty: 100 EACH | Refills: 0 | Status: SHIPPED | OUTPATIENT
Start: 2024-11-29 | End: 2024-11-29

## 2024-11-29 RX ORDER — AMOXICILLIN 250 MG
1 CAPSULE ORAL 2 TIMES DAILY
Qty: 60 TABLET | Refills: 0 | Status: SHIPPED | OUTPATIENT
Start: 2024-11-29 | End: 2024-12-29

## 2024-11-29 RX ORDER — ONDANSETRON 4 MG/1
4 TABLET, FILM COATED ORAL EVERY 8 HOURS PRN
Qty: 30 TABLET | Refills: 0 | Status: SHIPPED | OUTPATIENT
Start: 2024-11-29

## 2024-11-29 RX ORDER — FENOFIBRATE 48 MG/1
48 TABLET, COATED ORAL DAILY
Qty: 90 TABLET | Refills: 2 | Status: SHIPPED | OUTPATIENT
Start: 2024-11-29

## 2024-11-29 RX ORDER — HYDROMORPHONE HYDROCHLORIDE 1 MG/ML
0.5 INJECTION, SOLUTION INTRAMUSCULAR; INTRAVENOUS; SUBCUTANEOUS EVERY 4 HOURS PRN
Status: DISCONTINUED | OUTPATIENT
Start: 2024-11-29 | End: 2024-11-29 | Stop reason: HOSPADM

## 2024-11-29 RX ORDER — LANCETS 30 GAUGE
1 EACH MISCELLANEOUS DAILY
Qty: 100 EACH | Refills: 0 | Status: SHIPPED | OUTPATIENT
Start: 2024-11-29

## 2024-11-29 RX ADMIN — FENOFIBRATE 48 MG: 48 TABLET ORAL at 09:20

## 2024-11-29 RX ADMIN — OXYCODONE 5 MG: 5 TABLET ORAL at 07:40

## 2024-11-29 RX ADMIN — OXYCODONE 5 MG: 5 TABLET ORAL at 01:45

## 2024-11-29 RX ADMIN — HYDROMORPHONE HYDROCHLORIDE 0.5 MG: 1 INJECTION, SOLUTION INTRAMUSCULAR; INTRAVENOUS; SUBCUTANEOUS at 09:20

## 2024-11-29 RX ADMIN — INSULIN LISPRO 2 UNITS: 100 INJECTION, SOLUTION INTRAVENOUS; SUBCUTANEOUS at 07:40

## 2024-11-29 RX ADMIN — INSULIN LISPRO 4 UNITS: 100 INJECTION, SOLUTION INTRAVENOUS; SUBCUTANEOUS at 11:58

## 2024-11-29 RX ADMIN — NICOTINE 1 PATCH: 21 PATCH TRANSDERMAL at 09:19

## 2024-11-29 RX ADMIN — SENNOSIDES AND DOCUSATE SODIUM 2 TABLET: 50; 8.6 TABLET ORAL at 09:19

## 2024-11-29 NOTE — PLAN OF CARE
Problem: Adult Inpatient Plan of Care  Goal: Absence of Hospital-Acquired Illness or Injury  Intervention: Identify and Manage Fall Risk  Recent Flowsheet Documentation  Taken 11/29/2024 0405 by Liliane Tejeda, RN  Safety Promotion/Fall Prevention:   activity supervised   assistive device/personal items within reach   clutter free environment maintained   fall prevention program maintained   lighting adjusted   nonskid shoes/slippers when out of bed   room organization consistent   safety round/check completed  Taken 11/29/2024 0205 by Liliane Tejeda, RN  Safety Promotion/Fall Prevention:   activity supervised   assistive device/personal items within reach   clutter free environment maintained   fall prevention program maintained   lighting adjusted   nonskid shoes/slippers when out of bed   room organization consistent   safety round/check completed  Taken 11/29/2024 0005 by Liliane Tejeda, RN  Safety Promotion/Fall Prevention:   activity supervised   assistive device/personal items within reach   clutter free environment maintained   fall prevention program maintained   lighting adjusted   nonskid shoes/slippers when out of bed   room organization consistent   safety round/check completed  Taken 11/28/2024 2205 by Liliane Tejeda, RN  Safety Promotion/Fall Prevention:   activity supervised   assistive device/personal items within reach   clutter free environment maintained   fall prevention program maintained   lighting adjusted   nonskid shoes/slippers when out of bed   room organization consistent   safety round/check completed  Taken 11/28/2024 2105 by Liliane Tejeda, RN  Safety Promotion/Fall Prevention:   activity supervised   assistive device/personal items within reach   clutter free environment maintained   fall prevention program maintained   lighting adjusted   nonskid shoes/slippers when out of bed   room organization consistent   safety round/check completed  Intervention: Prevent Skin  Injury  Recent Flowsheet Documentation  Taken 11/29/2024 0405 by Liliane Tejeda RN  Body Position: position changed independently  Taken 11/29/2024 0205 by Liliane Tejeda RN  Body Position: position changed independently  Taken 11/29/2024 0005 by Liliane Tejeda RN  Body Position: position changed independently  Taken 11/28/2024 2205 by Liliane Tejeda RN  Body Position: position changed independently  Taken 11/28/2024 2105 by Liliane Tejeda RN  Body Position: position changed independently  Intervention: Prevent Infection  Recent Flowsheet Documentation  Taken 11/29/2024 0405 by Liliane Tejeda RN  Infection Prevention:   environmental surveillance performed   hand hygiene promoted   rest/sleep promoted   single patient room provided  Taken 11/29/2024 0205 by Liliane Tejeda RN  Infection Prevention:   environmental surveillance performed   hand hygiene promoted   rest/sleep promoted   single patient room provided  Taken 11/29/2024 0005 by Liliane Tejeda RN  Infection Prevention:   environmental surveillance performed   hand hygiene promoted   rest/sleep promoted   single patient room provided  Taken 11/28/2024 2205 by Liliane Tejeda RN  Infection Prevention:   environmental surveillance performed   hand hygiene promoted   rest/sleep promoted   single patient room provided  Taken 11/28/2024 2105 by Liliane Tejeda RN  Infection Prevention:   environmental surveillance performed   hand hygiene promoted   rest/sleep promoted   single patient room provided  Goal: Optimal Comfort and Wellbeing  Intervention: Monitor Pain and Promote Comfort  Recent Flowsheet Documentation  Taken 11/29/2024 0145 by Liliane Tejeda RN  Pain Management Interventions: pain medication given  Intervention: Provide Person-Centered Care  Recent Flowsheet Documentation  Taken 11/28/2024 2105 by Liliane Tejeda RN  Trust Relationship/Rapport:   care explained   choices provided   questions encouraged   questions  answered   thoughts/feelings acknowledged     Problem: Pancreatitis  Goal: Optimal Pain Control and Function  Intervention: Monitor and Manage Pain  Recent Flowsheet Documentation  Taken 11/29/2024 0145 by Liliane Tejeda, RN  Pain Management Interventions: pain medication given   Goal Outcome Evaluation:

## 2024-11-29 NOTE — OUTREACH NOTE
Prep Survey      Flowsheet Row Responses   [facility] patient discharged from? [facility]   Is LACE score < 7 ? No   Eligibility [facility]   Date of Admission 11/26/24   Date of Discharge 11/29/24   Discharge Disposition Home or Self Care   Discharge diagnosis Acute recurrent pancreatitis   Does the patient have one of the following disease processes/diagnoses(primary or secondary)? Other   Does the patient have Home health ordered? No   Is there a DME ordered? No   Prep survey completed? Yes            Miesha FRANKLIN - Registered Nurse

## 2024-11-29 NOTE — DISCHARGE SUMMARY
[facility]  DISCHARGE SUMMARY    Patient Name: Yg Araujo  : 1980  MRN: [id number]    Date of Admission: 2024  6:30 PM  Date of Discharge:  2024  Primary Care Physician: Tang Rios MD    Consults       Date and Time Order Name Status Description    2024  7:29 AM Inpatient Gastroenterology Consult Completed             Hospital Course       Active Hospital Problems    Diagnosis  POA    Tobacco use [Z72.0]  Yes    Polysubstance abuse [F19.10]  Yes    GERD without esophagitis [K21.9]  Yes    Alcohol abuse [F10.10]  Yes    Type 2 diabetes mellitus [E11.9]  Yes    Hypertriglyceridemia [E78.1]  Yes      Resolved Hospital Problems    Diagnosis Date Resolved POA    Pancreatitis [K85.90] 2024 Yes    Acute recurrent pancreatitis [K85.90] 2024 Yes          Hospital Course:  Yg Araujo is a 44 y.o. male  presented to the ED with worsening abdominal pain consistent with his prior history of pancreatitis.     Acute recurrent pancreatitis   ETOH use  Hypertriglyceridemia   -S/P insulin gtt for elevated triglycerides   -Continue Tricor  -Pain Control  -Advance diet as tolerated      Diabetes mellitus type 2 with hyperglycemia  - Patient has not had his metformin since May 2024  -- DM Educator has seen   -- Increase Metformin to 1000mg BID at DC  -- Rx for test strips and lancets sent to [facility]      Polysubstance abuse  -UDS positive for cocaine  - Cessation education provided     Tobacco use  - Smokes approximately 1 pack/day  - Cessation education provided     Noncompliance  - Complicates all aspects of care    Constipation  -bowel meds sent to pharmacy    Discharge Follow Up Recommendations for outpatient labs/diagnostics:  -PCP 1 week     Day of Discharge     HPI:   Patient seen and examined. No new issues. Discussed diet.    Review of Systems  Gen- No fevers, chills  CV- No chest pain, palpitations  Resp- No cough, dyspnea  GI- No  N/V/D, +abd pain with greasy foods    Vital Signs:   Temp:  [97.7 °F (36.5 °C)-98.1 °F (36.7 °C)] 97.7 °F (36.5 °C)  Heart Rate:  [68-75] 68  Resp:  [16-18] 16  BP: (112-138)/(72-92) 112/86      Physical Exam:  Constitutional: No acute distress, awake, alert  HENT: NCAT, mucous membranes moist  Respiratory: Clear to auscultation bilaterally, respiratory effort normal   Cardiovascular: RRR, no murmurs, rubs, or gallops  Gastrointestinal: Positive bowel sounds, soft, nontender, nondistended  Musculoskeletal: No bilateral ankle edema  Psychiatric: Appropriate affect, cooperative  Neurologic: Oriented x 3, RUIZ, speech clear  Skin: No rashes     Pertinent  and/or Most Recent Results     LAB RESULTS:      Lab 11/28/24  0606 11/27/24  0550 11/26/24  2257 11/26/24  1814   WBC 7.18 6.64  --  7.00   HEMOGLOBIN 14.8 14.6  --  15.7   HEMATOCRIT 44.8 44.5  --  46.8   PLATELETS 223 221  --  271   NEUTROS ABS  --  2.81  --  3.67   IMMATURE GRANS (ABS)  --  0.04  --  0.06*   LYMPHS ABS  --  2.93  --  2.63   MONOS ABS  --  0.59  --  0.46   EOS ABS  --  0.23  --  0.15   MCV 88.0 87.4  --  86.2   LACTATE  --   --  1.0 2.5*         Lab 11/28/24  0606 11/27/24  1130 11/27/24  0550 11/27/24  0114 11/26/24  2257 11/26/24  1821 11/26/24  1814   SODIUM 134* 136  137 139 136 135*  --  129*   POTASSIUM 4.4 4.1  --   --  4.2  --  4.4   CHLORIDE 100 101  --   --  100  --  93*   CO2 24.0 24.0  --   --  24.0  --  25.0   ANION GAP 10.0 11.0  --   --  11.0  --  11.0   BUN 12 13  --   --  13  --  13   CREATININE 0.73* 0.63*  --   --  0.83 0.90 0.90   EGFR 115.8 121.0  --   --  111.4  --  108.7   GLUCOSE 253* 105*  --   --  184*  --  525*   CALCIUM 8.8 9.1  --   --  9.1  --  9.4   MAGNESIUM  --   --  1.9  --  2.0  --   --    PHOSPHORUS  --   --  3.7  --  4.1  --   --    HEMOGLOBIN A1C  --   --   --   --   --   --  10.90*         Lab 11/28/24  0606 11/26/24  1814   TOTAL PROTEIN 6.1 7.2   ALBUMIN 3.6 4.2   GLOBULIN 2.5 3.0   ALT (SGPT) 9 10   AST  (SGOT) 15 19   BILIRUBIN 0.6 0.6   ALK PHOS 70 88   LIPASE  --  96*         Lab 11/26/24 1814   PROBNP <36.0   HSTROP T 15         Lab 11/27/24  1130 11/26/24  2257 11/26/24 1814   CHOLESTEROL  --   --  329*   LDL CHOL  --   --  94   HDL CHOL  --   --  29*   TRIGLYCERIDES 440* 1,109* 1,019*             Brief Urine Lab Results  (Last result in the past 365 days)        Color   Clarity   Blood   Leuk Est   Nitrite   Protein   CREAT   Urine HCG        11/26/24 1816 Yellow   Clear   Negative   Negative   Negative   Negative                 Microbiology Results (last 10 days)       ** No results found for the last 240 hours. **            CT Abdomen Pelvis With Contrast    Result Date: 11/26/2024  CT ABDOMEN PELVIS W CONTRAST Date of Exam: 11/26/2024 7:12 PM EST Indication: left abd pain. hx of pancreatitis.. Comparison: 4/30/2024 Technique: Axial CT images were obtained of the abdomen and pelvis following the uneventful intravenous administration of 85 cc Isovue-300 IV contrast . Reconstructed coronal and sagittal images were also obtained. Automated exposure control and iterative construction methods were used. FINDINGS: Lung bases: No masses. No consolidation. Liver:No masses. No intrahepatic biliary ductal dilatation. Spleen:No masses. No perisplenic hematoma. Pancreas: Pancreatic morphologically normal pancreatic tail. No associated mass. Gallbladder and common bile duct:No evidence of cholelithiasis. No evidence of cholecystitis. Adrenal glands:No adrenal masses Kidneys and ureters:No kidney stones. No renal masses.No calculi present within the ureters. Normal caliber ureters. Urinary bladder:No urinary bladder wall thickening. No bladder masses. Small bowel:Normal caliber small bowel. Large bowel: Colonic diverticulosis without evidence of diverticulitis. Appendix: Normal GENITOURINARY: Normal prostate Ascites or pneumoperitoneum:None. Adenopathy:None present Osseous structures: The proximal femurs are intact.  The pubic bones are intact. Mild degenerative changes of the hips and lumbar spine. The ribs are intact. Other findings: None     Inflammation of the pancreas predominantly involving the pancreatic tail pancreatitis. Electronically Signed: Tejas Ramirez MD  11/26/2024 7:38 PM EST  Workstation ID: [id number]                 Plan for Follow-up of Pending Labs/Results: Inbox     Discharge Details        Discharge Medications        New Medications        Instructions Start Date   glucose blood test strip   Check glucose daily. One Touch Verio Reflect      metFORMIN 1000 MG tablet  Commonly known as: GLUCOPHAGE  Replaces: metFORMIN  MG 24 hr tablet   1,000 mg, Oral, 2 Times Daily With Meals      ondansetron 4 MG tablet  Commonly known as: Zofran   4 mg, Oral, Every 8 Hours PRN      OneTouch UltraSoft 2 Lancets misc   1 Lancet, Not Applicable, Daily, One Touch Verio Reflect      oxyCODONE 5 MG immediate release tablet  Commonly known as: ROXICODONE   5 mg, Oral, Every 12 Hours PRN      polyethylene glycol 17 g packet  Commonly known as: MIRALAX   17 g, Oral, Daily PRN      sennosides-docusate 8.6-50 MG per tablet  Commonly known as: PERICOLACE   1 tablet, Oral, 2 Times Daily             Continue These Medications        Instructions Start Date   fenofibrate 48 MG tablet  Commonly known as: TRICOR   48 mg, Oral, Daily      Nicotine Step 1 21 MG/24HR patch  Generic drug: nicotine   1 patch, Transdermal, Every 24 Hours Scheduled             Stop These Medications      metFORMIN  MG 24 hr tablet  Commonly known as: GLUCOPHAGE-XR  Replaced by: metFORMIN 1000 MG tablet              No Known Allergies      Discharge Disposition:  Home or Self Care    Diet:  Hospital:  Diet Order   Procedures    Diet: Diabetic, Liquid; Full Liquid; Consistent Carbohydrate; Texture: Regular (IDDSI 7); Fluid Consistency: Thin (IDDSI 0)            Activity:      Restrictions or Other Recommendations:       CODE STATUS:    Code Status and  Medical Interventions: CPR (Attempt to Resuscitate); Full Support   Ordered at: 11/26/24 1629     Level Of Support Discussed With:    Patient     Code Status (Patient has no pulse and is not breathing):    CPR (Attempt to Resuscitate)     Medical Interventions (Patient has pulse or is breathing):    Full Support       No future appointments.    Additional Instructions for the Follow-ups that You Need to Schedule       Discharge Follow-up with PCP   As directed       Currently Documented PCP:    Tang Rios MD    PCP Phone Number:    [phone]     Follow Up Details: 1 week                      Suzy Skelton DO  11/29/24      Time Spent on Discharge:  I spent  50  minutes on this discharge activity which included: face-to-face encounter with the patient, reviewing the data in the system, coordination of the care with the nursing staff as well as consultants, documentation, and entering orders.

## 2024-11-30 LAB
QT INTERVAL: 356 MS
QTC INTERVAL: 420 MS

## 2024-12-02 ENCOUNTER — TRANSITIONAL CARE MANAGEMENT TELEPHONE ENCOUNTER (OUTPATIENT)
Dept: CALL CENTER | Facility: HOSPITAL | Age: 44
End: 2024-12-02
Payer: COMMERCIAL

## 2024-12-02 NOTE — OUTREACH NOTE
Call Center TCM Note      Flowsheet Row Responses   [facility] patient discharged from? [place]   Does the patient have one of the following disease processes/diagnoses(primary or secondary)? Other   TCM attempt successful? Yes   Call start time 1435   Call end time 1437   Discharge diagnosis Acute recurrent pancreatitis   Meds reviewed with patient/caregiver? Yes   Is the patient having any side effects they believe may be caused by any medication additions or changes? No   Does the patient have all medications ordered at discharge? Yes   Is the patient taking all medications as directed (includes completed medication regime)? Yes   Does the patient have an appointment with their PCP within 7-14 days of discharge? No appointments available   Nursing Interventions Routed TCM call to PCP office, PCP office requested to make appointment - message sent   Has home health visited the patient within 72 hours of discharge? N/A   Did the patient receive a copy of their discharge instructions? Yes   Nursing interventions Reviewed instructions with patient   What is the patient's perception of their health status since discharge? Same  [Pain in abdomen area,  pt was encouraged to seek medical attention if pain worsened or continued]   Is the patient/caregiver able to teach back signs and symptoms related to disease process for when to call PCP? Yes   Is the patient/caregiver able to teach back signs and symptoms related to disease process for when to call 911? Yes   Is the patient/caregiver able to teach back the hierarchy of who to call/visit for symptoms/problems? PCP, Specialist, Home health nurse, Urgent Care, ED, 911 Yes   If the patient is a current smoker, are they able to teach back resources for cessation? [id number]   TCM call completed? Yes   Call end time 1437            Afsaneh Rodriguez RN    12/2/2024, 14:38 EST

## 2024-12-02 NOTE — PAYOR COMM NOTE
"Mariana Barrios (44 y.o. Male)       Date of Birth   1980    Social Security Number       Address   [address] [address]    Home Phone   [phone]    MRN   [id number]       [place]   Yazidi    Marital Status   Single                            Admission Date   24    Admission Type   Emergency    Admitting Provider   Suzy Skelton DO    Attending Provider       Department, Room/Bed   [address], S510/1       Discharge Date   2024    Discharge Disposition   Home or Self Care    Discharge Destination                                 Attending Provider: (none)   Allergies: No Known Allergies    Isolation: None   Infection: None   Code Status: Prior    Ht: 177.8 cm (70\")   Wt: 80.3 kg (177 lb)    Admission Cmt: None   Principal Problem: None                  Active Insurance as of 2024       Primary Coverage       Payor Plan Insurance Group Employer/Plan Group    [employer] [id number]       Payor Plan Address Payor Plan Phone Number Payor Plan Fax Number Effective Dates    [address] [phone]  2024 - None Entered    [place] [id number]         Subscriber Name Subscriber Birth Date Member ID       MARIANA BARRIOS 1980 [id number]                     Emergency Contacts        (Rel.) Home Phone Work Phone Mobile Phone    Roslyn Waters (Mother) [phone] -- [phone]    Francoise Paez (Sister) [phone] -- [phone]                 Physician Progress Notes (all)        Suzy Skelton DO at 24 [address] [place] [address] Medicine Services  PROGRESS NOTE    Patient Name: Mariana Barrios  : 1980  MRN: [id number]    Date of Admission: 2024  Primary Care Physician: Tang Rios MD    Subjective   Subjective     CC:  F/U pancreatitis     HPI:  Patient seen and examined. Feeling better but still having some pain with eating. Doesn't feel " ready for DC today.     Objective   Objective     Vital Signs:   Temp:  [97.7 °F (36.5 °C)-98.7 °F (37.1 °C)] 98.4 °F (36.9 °C)  Heart Rate:  [77-84] 82  Resp:  [14-18] 18  BP: (110-131)/(73-90) 114/78     Physical Exam:  Constitutional: No acute distress, awake, alert  HENT: NCAT, mucous membranes moist  Respiratory: Clear to auscultation bilaterally, respiratory effort normal   Cardiovascular: RRR, no murmurs, rubs, or gallops  Gastrointestinal: Positive bowel sounds, soft, nontender, nondistended  Musculoskeletal: No bilateral ankle edema  Psychiatric: Appropriate affect, cooperative  Neurologic: Oriented x 3, RUIZ, speech clear  Skin: No rashes     Results Reviewed:  LAB RESULTS:      Lab 11/28/24  0606 11/27/24  0550 11/26/24  2257 11/26/24  1814   WBC 7.18 6.64  --  7.00   HEMOGLOBIN 14.8 14.6  --  15.7   HEMATOCRIT 44.8 44.5  --  46.8   PLATELETS 223 221  --  271   NEUTROS ABS  --  2.81  --  3.67   IMMATURE GRANS (ABS)  --  0.04  --  0.06*   LYMPHS ABS  --  2.93  --  2.63   MONOS ABS  --  0.59  --  0.46   EOS ABS  --  0.23  --  0.15   MCV 88.0 87.4  --  86.2   LACTATE  --   --  1.0 2.5*   HSTROP T  --   --   --  15         Lab 11/28/24  0606 11/27/24  1130 11/27/24  0550 11/27/24  0114 11/26/24  2257 11/26/24  1821 11/26/24  1814   SODIUM 134* 136  137 139 136 135*  --  129*   POTASSIUM 4.4 4.1  --   --  4.2  --  4.4   CHLORIDE 100 101  --   --  100  --  93*   CO2 24.0 24.0  --   --  24.0  --  25.0   ANION GAP 10.0 11.0  --   --  11.0  --  11.0   BUN 12 13  --   --  13  --  13   CREATININE 0.73* 0.63*  --   --  0.83 0.90 0.90   EGFR 115.8 121.0  --   --  111.4  --  108.7   GLUCOSE 253* 105*  --   --  184*  --  525*   CALCIUM 8.8 9.1  --   --  9.1  --  9.4   MAGNESIUM  --   --  1.9  --  2.0  --   --    PHOSPHORUS  --   --  3.7  --  4.1  --   --    HEMOGLOBIN A1C  --   --   --   --   --   --  10.90*         Lab 11/28/24  0606 11/26/24  1814   TOTAL PROTEIN 6.1 7.2   ALBUMIN 3.6 4.2   GLOBULIN 2.5 3.0   ALT  (SGPT) 9 10   AST (SGOT) 15 19   BILIRUBIN 0.6 0.6   ALK PHOS 70 88   LIPASE  --  96*         Lab 11/26/24 1814   PROBNP <36.0   HSTROP T 15         Lab 11/27/24  1130 11/26/24  2257 11/26/24 1814   CHOLESTEROL  --   --  329*   LDL CHOL  --   --  94   HDL CHOL  --   --  29*   TRIGLYCERIDES 440* 1,109* 1,019*             Brief Urine Lab Results  (Last result in the past 365 days)        Color   Clarity   Blood   Leuk Est   Nitrite   Protein   CREAT   Urine HCG        11/26/24 1816 Yellow   Clear   Negative   Negative   Negative   Negative                   Microbiology Results Abnormal       None            CT Abdomen Pelvis With Contrast    Result Date: 11/26/2024  CT ABDOMEN PELVIS W CONTRAST Date of Exam: 11/26/2024 7:12 PM EST Indication: left abd pain. hx of pancreatitis.. Comparison: 4/30/2024 Technique: Axial CT images were obtained of the abdomen and pelvis following the uneventful intravenous administration of 85 cc Isovue-300 IV contrast . Reconstructed coronal and sagittal images were also obtained. Automated exposure control and iterative construction methods were used. FINDINGS: Lung bases: No masses. No consolidation. Liver:No masses. No intrahepatic biliary ductal dilatation. Spleen:No masses. No perisplenic hematoma. Pancreas: Pancreatic morphologically normal pancreatic tail. No associated mass. Gallbladder and common bile duct:No evidence of cholelithiasis. No evidence of cholecystitis. Adrenal glands:No adrenal masses Kidneys and ureters:No kidney stones. No renal masses.No calculi present within the ureters. Normal caliber ureters. Urinary bladder:No urinary bladder wall thickening. No bladder masses. Small bowel:Normal caliber small bowel. Large bowel: Colonic diverticulosis without evidence of diverticulitis. Appendix: Normal GENITOURINARY: Normal prostate Ascites or pneumoperitoneum:None. Adenopathy:None present Osseous structures: The proximal femurs are intact. The pubic bones are intact.  Mild degenerative changes of the hips and lumbar spine. The ribs are intact. Other findings: None     Impression: Inflammation of the pancreas predominantly involving the pancreatic tail pancreatitis. Electronically Signed: Tejas Ramirez MD  11/26/2024 7:38 PM EST  Workstation ID: [id number]         Current medications:  Scheduled Meds:fenofibrate, 48 mg, Oral, Daily  insulin lispro, 2-7 Units, Subcutaneous, 4x Daily AC & at Bedtime  nicotine, 1 patch, Transdermal, Q24H  senna-docusate sodium, 2 tablet, Oral, BID      Continuous Infusions:     PRN Meds:.  acetaminophen **OR** acetaminophen **OR** acetaminophen    senna-docusate sodium **AND** polyethylene glycol **AND** bisacodyl **AND** bisacodyl    dextrose    dextrose    glucagon (human recombinant)    glucagon (human recombinant)    HYDROmorphone    nitroglycerin    ondansetron ODT **OR** ondansetron    [COMPLETED] Insert Peripheral IV **AND** sodium chloride    Assessment & Plan   Assessment & Plan     Active Hospital Problems    Diagnosis  POA    Pancreatitis [K85.90]  Yes    Tobacco use [Z72.0]  Yes    Polysubstance abuse [F19.10]  Yes    Acute recurrent pancreatitis [K85.90]  Yes    GERD without esophagitis [K21.9]  Yes    Alcohol abuse [F10.10]  Yes    Type 2 diabetes mellitus [E11.9]  Yes    Hypertriglyceridemia [E78.1]  Yes      Resolved Hospital Problems   No resolved problems to display.        Brief Hospital Course to date:  Yg Araujo is a 44 y.o. male presents to the ED with worsening abdominal pain consistent with his prior history of pancreatitis.    This patient's problems and plans were partially entered by my partner and updated as appropriate by me 11/28/24.      Acute recurrent pancreatitis   ETOH use  Hypertriglyceridemia   -S/P insulin gtt for elevated triglycerides   -Continue Tricor  -Pain Control, add oral option   -Diet as tolerated      Diabetes mellitus type 2 with hyperglycemia  - Patient has not had his metformin since May    -- DM Educator has seen   -- Increase SSI to Mod dose  -- Add Lantus at night, 10 units      Polysubstance abuse  -UDS positive for cocaine  - Cessation education provided     Tobacco use  - Smokes approximately 1 pack/day  - Nicotine patch  - Cessation education provided     Noncompliance  - Complicates all aspects of care    Expected Discharge Location and Transportation: Home tomorrow   Expected Discharge   Expected Discharge Date: 2024; Expected Discharge Time:      VTE Prophylaxis:  Mechanical VTE prophylaxis orders are present.         AM-PAC 6 Clicks Score (PT): 24 (24)    CODE STATUS:   Code Status and Medical Interventions: CPR (Attempt to Resuscitate); Full Support   Ordered at: 24     Level Of Support Discussed With:    Patient     Code Status (Patient has no pulse and is not breathing):    CPR (Attempt to Resuscitate)     Medical Interventions (Patient has pulse or is breathing):    Full Support       Suzy Skelton DO  24        Electronically signed by Suzy Skelton DO at 24 1050       Suzy Skelton DO at 24 1102              [facility] Medicine Services  PROGRESS NOTE    Patient Name: Yg Araujo  : 1980  MRN: [id number]    Date of Admission: 2024  Primary Care Physician: Tang Rios MD    Subjective   Subjective     CC:  F/U pancreatitis     HPI:  Patient seen and examined. Feels better. Would like to try some food. Says he has been out of medication for several months.     Objective   Objective     Vital Signs:   Temp:  [97.6 °F (36.4 °C)-98.2 °F (36.8 °C)] 97.8 °F (36.6 °C)  Heart Rate:  [72-91] 84  Resp:  [14-18] 14  BP: (102-129)/(64-90) 103/64     Physical Exam:  Constitutional: No acute distress, awake, alert  HENT: NCAT, mucous membranes moist  Respiratory: Clear to auscultation bilaterally, respiratory effort normal   Cardiovascular: RRR, no murmurs, rubs, or  gallops  Gastrointestinal: Positive bowel sounds, soft, nontender, nondistended  Musculoskeletal: No bilateral ankle edema  Psychiatric: Appropriate affect, cooperative  Neurologic: Oriented x 3, RUIZ, speech clear  Skin: No rashes     Results Reviewed:  LAB RESULTS:      Lab 11/27/24  0550 11/26/24 2257 11/26/24 1814   WBC 6.64  --  7.00   HEMOGLOBIN 14.6  --  15.7   HEMATOCRIT 44.5  --  46.8   PLATELETS 221  --  271   NEUTROS ABS 2.81  --  3.67   IMMATURE GRANS (ABS) 0.04  --  0.06*   LYMPHS ABS 2.93  --  2.63   MONOS ABS 0.59  --  0.46   EOS ABS 0.23  --  0.15   MCV 87.4  --  86.2   LACTATE  --  1.0 2.5*   HSTROP T  --   --  15         Lab 11/27/24  0550 11/27/24  0114 11/26/24 2257 11/26/24  1821 11/26/24  1814   SODIUM 139 136 135*  --  129*   POTASSIUM  --   --  4.2  --  4.4   CHLORIDE  --   --  100  --  93*   CO2  --   --  24.0  --  25.0   ANION GAP  --   --  11.0  --  11.0   BUN  --   --  13  --  13   CREATININE  --   --  0.83 0.90 0.90   EGFR  --   --  111.4  --  108.7   GLUCOSE  --   --  184*  --  525*   CALCIUM  --   --  9.1  --  9.4   MAGNESIUM 1.9  --  2.0  --   --    PHOSPHORUS 3.7  --  4.1  --   --    HEMOGLOBIN A1C  --   --   --   --  10.90*         Lab 11/26/24 1814   TOTAL PROTEIN 7.2   ALBUMIN 4.2   GLOBULIN 3.0   ALT (SGPT) 10   AST (SGOT) 19   BILIRUBIN 0.6   ALK PHOS 88   LIPASE 96*         Lab 11/26/24  1814   PROBNP <36.0   HSTROP T 15         Lab 11/26/24 2257 11/26/24  1814   CHOLESTEROL  --  329*   LDL CHOL  --  94   HDL CHOL  --  29*   TRIGLYCERIDES 1,109* 1,019*             Brief Urine Lab Results  (Last result in the past 365 days)        Color   Clarity   Blood   Leuk Est   Nitrite   Protein   CREAT   Urine HCG        11/26/24 1816 Yellow   Clear   Negative   Negative   Negative   Negative                   Microbiology Results Abnormal       None            CT Abdomen Pelvis With Contrast    Result Date: 11/26/2024  CT ABDOMEN PELVIS W CONTRAST Date of Exam: 11/26/2024 7:12 PM EST  Indication: left abd pain. hx of pancreatitis.. Comparison: 4/30/2024 Technique: Axial CT images were obtained of the abdomen and pelvis following the uneventful intravenous administration of 85 cc Isovue-300 IV contrast . Reconstructed coronal and sagittal images were also obtained. Automated exposure control and iterative construction methods were used. FINDINGS: Lung bases: No masses. No consolidation. Liver:No masses. No intrahepatic biliary ductal dilatation. Spleen:No masses. No perisplenic hematoma. Pancreas: Pancreatic morphologically normal pancreatic tail. No associated mass. Gallbladder and common bile duct:No evidence of cholelithiasis. No evidence of cholecystitis. Adrenal glands:No adrenal masses Kidneys and ureters:No kidney stones. No renal masses.No calculi present within the ureters. Normal caliber ureters. Urinary bladder:No urinary bladder wall thickening. No bladder masses. Small bowel:Normal caliber small bowel. Large bowel: Colonic diverticulosis without evidence of diverticulitis. Appendix: Normal GENITOURINARY: Normal prostate Ascites or pneumoperitoneum:None. Adenopathy:None present Osseous structures: The proximal femurs are intact. The pubic bones are intact. Mild degenerative changes of the hips and lumbar spine. The ribs are intact. Other findings: None     Impression: Inflammation of the pancreas predominantly involving the pancreatic tail pancreatitis. Electronically Signed: Tejas Ramirez MD  11/26/2024 7:38 PM EST  Workstation ID: [id number]         Current medications:  Scheduled Meds:fenofibrate, 48 mg, Oral, Daily  nicotine, 1 patch, Transdermal, Q24H  prochlorperazine, 10 mg, Intravenous, Once  senna-docusate sodium, 2 tablet, Oral, BID      Continuous Infusions:dextrose, 1-2 mL/kg/hr, Last Rate: 1.806 mL/kg/hr (11/27/24 0907)  insulin, 0.05 Units/kg/hr, Last Rate: 0.05 Units/kg/hr (11/27/24 0649)      PRN Meds:.  acetaminophen **OR** acetaminophen **OR** acetaminophen     senna-docusate sodium **AND** polyethylene glycol **AND** bisacodyl **AND** bisacodyl    dextrose    dextrose    glucagon (human recombinant)    HYDROmorphone    nitroglycerin    ondansetron ODT **OR** ondansetron    Pharmacy Consult    [COMPLETED] Insert Peripheral IV **AND** sodium chloride    Assessment & Plan   Assessment & Plan     Active Hospital Problems    Diagnosis  POA    Pancreatitis [K85.90]  Yes    Tobacco use [Z72.0]  Yes    Polysubstance abuse [F19.10]  Yes    Acute recurrent pancreatitis [K85.90]  Yes    GERD without esophagitis [K21.9]  Yes    Alcohol abuse [F10.10]  Yes    Type 2 diabetes mellitus [E11.9]  Yes    Hypertriglyceridemia [E78.1]  Yes      Resolved Hospital Problems   No resolved problems to display.        Brief Hospital Course to date:  Yg Araujo is a 44 y.o. male presents to the ED with worsening abdominal pain consistent with his prior history of pancreatitis.     Acute recurrent pancreatitis   ETOH use  Hypertriglyceridemia   - CT abdomen pelvis as mentioned above  - Reports last alcohol use was over the weekend  - Has not had Tricor since May 2024  - IV fluids  - Pain control  - CIWA  -- Clear liquid diet, advance as tolerated  -- DC IV insulin gtt as triglycerides now 440  -- Continue Tricor  -- GI consulted      Diabetes mellitus type 2 with hyperglycemia  - Patient has not had his metformin since May 2024  -- LDSSI + Accu-checks   -- DM Educator      Polysubstance abuse  -UDS positive for cocaine  - Cessation education provided     Tobacco use  - Smokes approximately 1 pack/day  - Nicotine patch  - Cessation education provided     Noncompliance  - Complicates all aspects of care    Expected Discharge Location and Transportation: Home  Expected Discharge   Expected Discharge Date: 11/29/2024; Expected Discharge Time:      VTE Prophylaxis:  Mechanical VTE prophylaxis orders are present.         AM-PAC 6 Clicks Score (PT): 24 (11/26/24 3167)    CODE STATUS:   Code Status  and Medical Interventions: CPR (Attempt to Resuscitate); Full Support   Ordered at: 24     Level Of Support Discussed With:    Patient     Code Status (Patient has no pulse and is not breathing):    CPR (Attempt to Resuscitate)     Medical Interventions (Patient has pulse or is breathing):    Full Support       Suzy Skelton DO  24        Electronically signed by Suzy Skelton DO at 24 1216          Discharge Summary        Suzy Skelton DO at 24 1214              [facility] Medicine Services  DISCHARGE SUMMARY    Patient Name: Yg Araujo  : 1980  MRN: [id number]    Date of Admission: 2024  6:30 PM  Date of Discharge:  2024  Primary Care Physician: Tang Rios MD    Consults       Date and Time Order Name Status Description    2024  7:29 AM Inpatient Gastroenterology Consult Completed             Hospital Course       Active Hospital Problems    Diagnosis  POA    Tobacco use [Z72.0]  Yes    Polysubstance abuse [F19.10]  Yes    GERD without esophagitis [K21.9]  Yes    Alcohol abuse [F10.10]  Yes    Type 2 diabetes mellitus [E11.9]  Yes    Hypertriglyceridemia [E78.1]  Yes      Resolved Hospital Problems    Diagnosis Date Resolved POA    Pancreatitis [K85.90] 2024 Yes    Acute recurrent pancreatitis [K85.90] 2024 Yes          Hospital Course:  Yg Araujo is a 44 y.o. male  presented to the ED with worsening abdominal pain consistent with his prior history of pancreatitis.     Acute recurrent pancreatitis   ETOH use  Hypertriglyceridemia   -S/P insulin gtt for elevated triglycerides   -Continue Tricor  -Pain Control  -Advance diet as tolerated      Diabetes mellitus type 2 with hyperglycemia  - Patient has not had his metformin since May 2024  -- DM Educator has seen   -- Increase Metformin to 1000mg BID at DC  -- Rx for test strips and lancets sent to [place]      Polysubstance abuse  -UDS positive  for cocaine  - Cessation education provided     Tobacco use  - Smokes approximately 1 pack/day  - Cessation education provided     Noncompliance  - Complicates all aspects of care    Constipation  -bowel meds sent to pharmacy    Discharge Follow Up Recommendations for outpatient labs/diagnostics:  -PCP 1 week     Day of Discharge     HPI:   Patient seen and examined. No new issues. Discussed diet.    Review of Systems  Gen- No fevers, chills  CV- No chest pain, palpitations  Resp- No cough, dyspnea  GI- No N/V/D, +abd pain with greasy foods    Vital Signs:   Temp:  [97.7 °F (36.5 °C)-98.1 °F (36.7 °C)] 97.7 °F (36.5 °C)  Heart Rate:  [68-75] 68  Resp:  [16-18] 16  BP: (112-138)/(72-92) 112/86      Physical Exam:  Constitutional: No acute distress, awake, alert  HENT: NCAT, mucous membranes moist  Respiratory: Clear to auscultation bilaterally, respiratory effort normal   Cardiovascular: RRR, no murmurs, rubs, or gallops  Gastrointestinal: Positive bowel sounds, soft, nontender, nondistended  Musculoskeletal: No bilateral ankle edema  Psychiatric: Appropriate affect, cooperative  Neurologic: Oriented x 3, RUIZ, speech clear  Skin: No rashes     Pertinent  and/or Most Recent Results     LAB RESULTS:      Lab 11/28/24  0606 11/27/24  0550 11/26/24  2257 11/26/24  1814   WBC 7.18 6.64  --  7.00   HEMOGLOBIN 14.8 14.6  --  15.7   HEMATOCRIT 44.8 44.5  --  46.8   PLATELETS 223 221  --  271   NEUTROS ABS  --  2.81  --  3.67   IMMATURE GRANS (ABS)  --  0.04  --  0.06*   LYMPHS ABS  --  2.93  --  2.63   MONOS ABS  --  0.59  --  0.46   EOS ABS  --  0.23  --  0.15   MCV 88.0 87.4  --  86.2   LACTATE  --   --  1.0 2.5*         Lab 11/28/24  0606 11/27/24  1130 11/27/24  0550 11/27/24  0114 11/26/24  2257 11/26/24  1821 11/26/24  1814   SODIUM 134* 136  137 139 136 135*  --  129*   POTASSIUM 4.4 4.1  --   --  4.2  --  4.4   CHLORIDE 100 101  --   --  100  --  93*   CO2 24.0 24.0  --   --  24.0  --  25.0   ANION GAP 10.0 11.0  --    --  11.0  --  11.0   BUN 12 13  --   --  13  --  13   CREATININE 0.73* 0.63*  --   --  0.83 0.90 0.90   EGFR 115.8 121.0  --   --  111.4  --  108.7   GLUCOSE 253* 105*  --   --  184*  --  525*   CALCIUM 8.8 9.1  --   --  9.1  --  9.4   MAGNESIUM  --   --  1.9  --  2.0  --   --    PHOSPHORUS  --   --  3.7  --  4.1  --   --    HEMOGLOBIN A1C  --   --   --   --   --   --  10.90*         Lab 11/28/24  0606 11/26/24  1814   TOTAL PROTEIN 6.1 7.2   ALBUMIN 3.6 4.2   GLOBULIN 2.5 3.0   ALT (SGPT) 9 10   AST (SGOT) 15 19   BILIRUBIN 0.6 0.6   ALK PHOS 70 88   LIPASE  --  96*         Lab 11/26/24  1814   PROBNP <36.0   HSTROP T 15         Lab 11/27/24  1130 11/26/24  2257 11/26/24  1814   CHOLESTEROL  --   --  329*   LDL CHOL  --   --  94   HDL CHOL  --   --  29*   TRIGLYCERIDES 440* 1,109* 1,019*             Brief Urine Lab Results  (Last result in the past 365 days)        Color   Clarity   Blood   Leuk Est   Nitrite   Protein   CREAT   Urine HCG        11/26/24 1816 Yellow   Clear   Negative   Negative   Negative   Negative                 Microbiology Results (last 10 days)       ** No results found for the last 240 hours. **            CT Abdomen Pelvis With Contrast    Result Date: 11/26/2024  CT ABDOMEN PELVIS W CONTRAST Date of Exam: 11/26/2024 7:12 PM EST Indication: left abd pain. hx of pancreatitis.. Comparison: 4/30/2024 Technique: Axial CT images were obtained of the abdomen and pelvis following the uneventful intravenous administration of 85 cc Isovue-300 IV contrast . Reconstructed coronal and sagittal images were also obtained. Automated exposure control and iterative construction methods were used. FINDINGS: Lung bases: No masses. No consolidation. Liver:No masses. No intrahepatic biliary ductal dilatation. Spleen:No masses. No perisplenic hematoma. Pancreas: Pancreatic morphologically normal pancreatic tail. No associated mass. Gallbladder and common bile duct:No evidence of cholelithiasis. No evidence of  cholecystitis. Adrenal glands:No adrenal masses Kidneys and ureters:No kidney stones. No renal masses.No calculi present within the ureters. Normal caliber ureters. Urinary bladder:No urinary bladder wall thickening. No bladder masses. Small bowel:Normal caliber small bowel. Large bowel: Colonic diverticulosis without evidence of diverticulitis. Appendix: Normal GENITOURINARY: Normal prostate Ascites or pneumoperitoneum:None. Adenopathy:None present Osseous structures: The proximal femurs are intact. The pubic bones are intact. Mild degenerative changes of the hips and lumbar spine. The ribs are intact. Other findings: None     Inflammation of the pancreas predominantly involving the pancreatic tail pancreatitis. Electronically Signed: Tejas Ramirez MD  11/26/2024 7:38 PM EST  Workstation ID: [id number]                 Plan for Follow-up of Pending Labs/Results: Inbox     Discharge Details        Discharge Medications        New Medications        Instructions Start Date   glucose blood test strip   Check glucose daily. One Touch Verio Reflect      metFORMIN 1000 MG tablet  Commonly known as: GLUCOPHAGE  Replaces: metFORMIN  MG 24 hr tablet   1,000 mg, Oral, 2 Times Daily With Meals      ondansetron 4 MG tablet  Commonly known as: Zofran   4 mg, Oral, Every 8 Hours PRN      OneTouch UltraSoft 2 Lancets misc   1 Lancet, Not Applicable, Daily, One Touch Verio Reflect      oxyCODONE 5 MG immediate release tablet  Commonly known as: ROXICODONE   5 mg, Oral, Every 12 Hours PRN      polyethylene glycol 17 g packet  Commonly known as: MIRALAX   17 g, Oral, Daily PRN      sennosides-docusate 8.6-50 MG per tablet  Commonly known as: PERICOLACE   1 tablet, Oral, 2 Times Daily             Continue These Medications        Instructions Start Date   fenofibrate 48 MG tablet  Commonly known as: TRICOR   48 mg, Oral, Daily      Nicotine Step 1 21 MG/24HR patch  Generic drug: nicotine   1 patch, Transdermal, Every 24 Hours  Scheduled             Stop These Medications      metFORMIN  MG 24 hr tablet  Commonly known as: GLUCOPHAGE-XR  Replaced by: metFORMIN 1000 MG tablet              No Known Allergies      Discharge Disposition:  Home or Self Care    Diet:  Hospital:  Diet Order   Procedures    Diet: Diabetic, Liquid; Full Liquid; Consistent Carbohydrate; Texture: Regular (IDDSI 7); Fluid Consistency: Thin (IDDSI 0)            Activity:      Restrictions or Other Recommendations:       CODE STATUS:    Code Status and Medical Interventions: CPR (Attempt to Resuscitate); Full Support   Ordered at: 11/26/24 1937     Level Of Support Discussed With:    Patient     Code Status (Patient has no pulse and is not breathing):    CPR (Attempt to Resuscitate)     Medical Interventions (Patient has pulse or is breathing):    Full Support       No future appointments.    Additional Instructions for the Follow-ups that You Need to Schedule       Discharge Follow-up with PCP   As directed       Currently Documented PCP:    Tang Rios MD    PCP Phone Number:    [phone]     Follow Up Details: 1 week                      Suzy Skelton DO  11/29/24      Time Spent on Discharge:  I spent  50  minutes on this discharge activity which included: face-to-face encounter with the patient, reviewing the data in the system, coordination of the care with the nursing staff as well as consultants, documentation, and entering orders.            Electronically signed by Suzy Skelton DO at 11/29/24 7755

## 2024-12-03 NOTE — PAYOR COMM NOTE
"Mariana Barrios (44 y.o. Male)       Date of Birth   1980    Social Security Number       Address   [address] [facility] [address]    Home Phone   [phone]    MRN   [id number]       Catholic   Sabianism    Marital Status   Single                            Admission Date   24    Admission Type   Emergency    Admitting Provider   Suzy Skelton DO    Attending Provider       Department, Room/Bed   [address], S510/1       Discharge Date   2024    Discharge Disposition   Home or Self Care    Discharge Destination                                 Attending Provider: (none)   Allergies: No Known Allergies    Isolation: None   Infection: None   Code Status: Prior    Ht: 177.8 cm (70\")   Wt: 80.3 kg (177 lb)    Admission Cmt: None   Principal Problem: None                  Active Insurance as of 2024       Primary Coverage       Payor Plan Insurance Group Employer/Plan Group    [employer] [id number]       Payor Plan Address Payor Plan Phone Number Payor Plan Fax Number Effective Dates    [address] [phone]  2024 - None Entered    [place] [id number]         Subscriber Name Subscriber Birth Date Member ID       MARIANA BARRIOS 1980 [id number]                     Emergency Contacts        (Rel.) Home Phone Work Phone Mobile Phone    Roslyn Waters (Mother) [phone] -- [phone]    Francoise Paez (Sister) [phone] -- [phone]             [address]  [address] [facility] [address]  Phone:  [phone]  Fax:  [phone]        Patient:     Mariana Barrios MRN:  [id number]   [address]  [facility] [address] :  1980  SSN:    Phone: [phone] Sex:  M      INSURANCE PAYOR PLAN GROUP # SUBSCRIBER ID   Primary:    [employer] [id number]   Admitting Diagnosis: Pancreatitis [K85.90]  Order Date:  2024      "   Inpatient Admission       (Order ID: [id number])     Diagnosis:         Priority:  Routine Expected Date:   Expiration Date:        Interval:   Count:    Level of Care: Telemetry [5]  Diagnosis: Pancreatitis [202663]  Certification: I Certify That Inpatient Hospital Services Are Medically Necessary For Greater Than 2 Midnights     Specimen Type:   Specimen Source:   Specimen Taken Date:   Specimen Taken Time:                  Verbal Order Mode: Verbal with readback   Authorizing Provider: Suzy Skelton DO  Authorizing Provider's NPI: [id number]     Order Entered By: Parveen Kwong RN 11/27/2024  7:22 AM     Electronically signed by: Suzy Skelton DO 11/27/2024  7:27 AM

## 2024-12-17 ENCOUNTER — READMISSION MANAGEMENT (OUTPATIENT)
Dept: CALL CENTER | Facility: HOSPITAL | Age: 44
End: 2024-12-17
Payer: COMMERCIAL

## 2024-12-17 NOTE — OUTREACH NOTE
Medical Week 3 Survey      Flowsheet Row Responses   [facility] patient discharged from? [place]   Does the patient have one of the following disease processes/diagnoses(primary or secondary)? Other   Week 3 attempt successful? Yes   Call start time 1445   Call end time 1448   Discharge diagnosis Acute recurrent pancreatitis   Person spoke with today (if not patient) and relationship patient   Meds reviewed with patient/caregiver? Yes   Is the patient having any side effects they believe may be caused by any medication additions or changes? No   Does the patient have all medications ordered at discharge? Yes   Is the patient taking all medications as directed (includes completed medication regime)? Yes   Comments regarding appointments Pt will see Dr. Rios tomorrow   Does the patient have a primary care provider?  Yes   Comments regarding PCP Dr. Rios   Does the patient have an appointment with their PCP within 7 days of discharge? Yes   Has the patient kept scheduled appointments due by today? Yes   Comments BS are high, he states he has been eating a well balanced diet.  he will let PCP know tomorrow about BS levels.   Did the patient receive a copy of their discharge instructions? Yes   Nursing interventions Reviewed instructions with patient   What is the patient's perception of their health status since discharge? Improving   Is the patient/caregiver able to teach back the hierarchy of who to call/visit for symptoms/problems? PCP, Specialist, Home health nurse, Urgent Care, ED, 911 Yes   Week 3 Call Completed? Yes   Graduated Yes   Is the patient interested in additional calls from an ambulatory ? No   Would this patient benefit from a Referral to Amb Social Work? No   Wrap up additional comments Pt is doing well this week.  Denies needs.   Call end time 1448            VERONICA VALDES - Registered Nurse

## 2024-12-18 ENCOUNTER — OFFICE VISIT (OUTPATIENT)
Dept: FAMILY MEDICINE CLINIC | Facility: CLINIC | Age: 44
End: 2024-12-18
Payer: COMMERCIAL

## 2024-12-18 VITALS
HEIGHT: 70 IN | OXYGEN SATURATION: 97 % | BODY MASS INDEX: 25.4 KG/M2 | HEART RATE: 83 BPM | DIASTOLIC BLOOD PRESSURE: 94 MMHG | SYSTOLIC BLOOD PRESSURE: 127 MMHG

## 2024-12-18 DIAGNOSIS — E11.9 TYPE 2 DIABETES MELLITUS WITHOUT COMPLICATION, WITHOUT LONG-TERM CURRENT USE OF INSULIN: ICD-10-CM

## 2024-12-18 DIAGNOSIS — F10.10 ALCOHOL ABUSE: ICD-10-CM

## 2024-12-18 DIAGNOSIS — Z51.81 THERAPEUTIC DRUG MONITORING: ICD-10-CM

## 2024-12-18 DIAGNOSIS — E78.1 HYPERTRIGLYCERIDEMIA: ICD-10-CM

## 2024-12-18 DIAGNOSIS — E11.42 DIABETIC POLYNEUROPATHY ASSOCIATED WITH TYPE 2 DIABETES MELLITUS: ICD-10-CM

## 2024-12-18 DIAGNOSIS — F19.10 POLYSUBSTANCE ABUSE: Primary | ICD-10-CM

## 2024-12-18 LAB — ALBUMIN UR-MCNC: 1.8 MG/DL

## 2024-12-18 PROCEDURE — 82043 UR ALBUMIN QUANTITATIVE: CPT | Performed by: INTERNAL MEDICINE

## 2024-12-18 RX ORDER — FENOFIBRATE 48 MG/1
48 TABLET, COATED ORAL DAILY
Qty: 90 TABLET | Refills: 2 | Status: SHIPPED | OUTPATIENT
Start: 2024-12-18

## 2024-12-18 RX ORDER — GABAPENTIN 300 MG/1
300 CAPSULE ORAL 3 TIMES DAILY
Qty: 90 CAPSULE | Refills: 2 | Status: SHIPPED | OUTPATIENT
Start: 2024-12-18

## 2024-12-18 RX ORDER — LANCETS 30 GAUGE
1 EACH MISCELLANEOUS DAILY
Qty: 100 EACH | Refills: 0 | Status: SHIPPED | OUTPATIENT
Start: 2024-12-18

## 2024-12-18 RX ORDER — CHLORAL HYDRATE 500 MG
1000 CAPSULE ORAL
Qty: 90 CAPSULE | Refills: 3 | Status: SHIPPED | OUTPATIENT
Start: 2024-12-18

## 2024-12-18 RX ORDER — GLIPIZIDE 5 MG/1
5 TABLET, FILM COATED, EXTENDED RELEASE ORAL DAILY
Qty: 90 TABLET | Refills: 2 | Status: SHIPPED | OUTPATIENT
Start: 2024-12-18

## 2024-12-18 NOTE — PROGRESS NOTES
Transitional Care Follow Up Visit  Subjective     Yg Araujo is a 44 y.o. male who presents for a transitional care management visit.    Within 48 business hours after discharge our office contacted him via telephone to coordinate his care and needs.      I reviewed and discussed the details of that call along with the discharge summary, hospital problems, inpatient lab results, inpatient diagnostic studies, and consultation reports with Yg.     Current outpatient and discharge medications have been reconciled for the patient.  Reviewed by: Tang Rios MD          11/29/2024     5:47 PM   Date of TCM Phone Call   [facility]   Date of Admission 11/26/2024   Date of Discharge 11/29/2024   Discharge Disposition Home or Self Care     Risk for Readmission (LACE) No data recorded    History of Present Illness   Course During Hospital Stay:  Yg Araujo is a 44 y.o. male  presented to the ED with worsening abdominal pain consistent with his prior history of pancreatitis.     Acute recurrent pancreatitis   ETOH use  Hypertriglyceridemia   -S/P insulin gtt for elevated triglycerides   -Continue Tricor  -Pain Control  -Advance diet as tolerated      Diabetes mellitus type 2 with hyperglycemia  - Patient has not had his metformin since May 2024  -- DM Educator has seen   -- Increase Metformin to 1000mg BID at DC  -- Rx for test strips and lancets sent to [facility]      Polysubstance abuse  -UDS positive for cocaine  - Cessation education provided     Tobacco use  - Smokes approximately 1 pack/day  - Cessation education provided     Noncompliance  - Complicates all aspects of care     Constipation  -bowel meds sent to pharmacy       He has been checking his blood sugars up to 200's. He has cut alcohol and cocaine.  Feet burns 9/10  no ulcers. Contiues with polyuria and dispsia. Has not see opth in 2 years.     The following portions of the patient's history were reviewed and updated as  "appropriate: He  has a past medical history of Alcohol abuse, Diverticulitis, GERD (gastroesophageal reflux disease), HLD (hyperlipidemia), HTN (hypertension), Pancreatitis, Pancreatitis, Polysubstance abuse, and Type 2 diabetes mellitus.  He does not have any pertinent problems on file.  He  has no past surgical history on file.  His family history includes Diabetes in his father; Heart disease in his father; Hypertension in his father.  He  reports that he has been smoking cigarettes. He started smoking about 29 years ago. He has a 44.9 pack-year smoking history. He has never used smokeless tobacco. He reports current alcohol use of about 24.0 standard drinks of alcohol per week. He reports current drug use. Drugs: Marijuana and Cocaine(coke).  Current Outpatient Medications   Medication Sig Dispense Refill    fenofibrate (TRICOR) 48 MG tablet Take 1 tablet by mouth Daily. 90 tablet 2    glucose blood test strip Use to check glucose daily. 100 each 2    metFORMIN (GLUCOPHAGE) 500 MG tablet Take 1 tablet by mouth 2 (Two) Times a Day With Meals. 180 tablet 2    ondansetron (Zofran) 4 MG tablet Take 1 tablet by mouth Every 8 (Eight) Hours As Needed for Nausea or Vomiting. 30 tablet 0    OneTouch UltraSoft 2 Lancets misc Use 1 Lancet Daily. 100 each 0    polyethylene glycol (MIRALAX) 17 GM/SCOOP powder Take 17 g by mouth Daily As Needed (Use if senna-docusate is ineffective). 510 g 0    gabapentin (NEURONTIN) 300 MG capsule Take 1 capsule by mouth 3 (Three) Times a Day. 90 capsule 2    glipizide (Glucotrol XL) 5 MG ER tablet Take 1 tablet by mouth Daily. 90 tablet 2    Omega-3 Fatty Acids (fish oil) 1000 MG capsule capsule Take 1 capsule by mouth Daily With Breakfast. 90 capsule 3     No current facility-administered medications for this visit.     He has No Known Allergies..    Review of Systems    Objective   /94   Pulse 83   Ht 177.8 cm (70\")   SpO2 97%   BMI 25.40 kg/m²   Physical Exam  Vitals and " nursing note reviewed.   Constitutional:       General: He is not in acute distress.     Appearance: He is well-developed. He is not diaphoretic.   HENT:      Head: Normocephalic and atraumatic.      Right Ear: External ear normal.      Left Ear: External ear normal.      Mouth/Throat:      Pharynx: No oropharyngeal exudate.   Eyes:      General: No scleral icterus.        Right eye: No discharge.      Conjunctiva/sclera: Conjunctivae normal.   Neck:      Thyroid: No thyromegaly.      Vascular: No JVD.      Trachea: No tracheal deviation.   Cardiovascular:      Rate and Rhythm: Normal rate and regular rhythm.      Pulses:           Dorsalis pedis pulses are 2+ on the right side and 2+ on the left side.        Posterior tibial pulses are 2+ on the right side and 2+ on the left side.      Heart sounds: Normal heart sounds.      Comments: PMI nondisplaced  Pulmonary:      Effort: Pulmonary effort is normal.      Breath sounds: Normal breath sounds. No wheezing or rales.   Abdominal:      General: Bowel sounds are normal.      Palpations: Abdomen is soft.      Tenderness: There is no abdominal tenderness. There is no guarding or rebound.   Musculoskeletal:      Cervical back: Normal range of motion and neck supple.   Feet:      Right foot:      Protective Sensation: 5 sites tested.  2 sites sensed.      Skin integrity: Dry skin present.      Toenail Condition: Right toenails are normal.      Left foot:      Protective Sensation: 5 sites tested.   1 site sensed.     Skin integrity: Dry skin present.      Toenail Condition: Left toenails are normal.   Lymphadenopathy:      Cervical: No cervical adenopathy.   Skin:     General: Skin is warm and dry.      Capillary Refill: Capillary refill takes less than 2 seconds.      Coloration: Skin is not pale.      Findings: No rash.   Neurological:      Mental Status: He is alert and oriented to person, place, and time.      Motor: No abnormal muscle tone.      Coordination:  Coordination normal.   Psychiatric:         Judgment: Judgment normal.               Component  Ref Range & Units 3 wk ago  (11/26/24) 7 mo ago  (4/30/24) 1 yr ago  (11/28/23) 2 yr ago  (3/30/22) 2 yr ago  (3/20/22)   Hemoglobin A1C  4.80 - 5.60 % 10.90 High  10.10 High  9.80 High  7.70 High  7.80 High    Resulting Agency [facility]      mponent  Ref Range & Units 3 wk ago  (11/27/24) 3 wk ago  (11/26/24) 3 wk ago  (11/26/24) 7 mo ago  (4/30/24) 1 yr ago  (11/27/23) 1 yr ago  (11/25/23) 1 yr ago  (11/25/23)   Triglycerides  0 - 150 mg/dL 440 High  1,109 High  CM 1,019 High  495 High  404 High  CM         Assessment & Plan   Diagnoses and all orders for this visit:    1. Polysubstance abuse (Primary)    2. Alcohol abuse    3. Hypertriglyceridemia  -     Omega-3 Fatty Acids (fish oil) 1000 MG capsule capsule; Take 1 capsule by mouth Daily With Breakfast.  Dispense: 90 capsule; Refill: 3  -     Ambulatory Referral to Nutrition Services    4. Type 2 diabetes mellitus without complication, without long-term current use of insulin  -     metFORMIN (GLUCOPHAGE) 500 MG tablet; Take 1 tablet by mouth 2 (Two) Times a Day With Meals.  Dispense: 180 tablet; Refill: 2  -     glipizide (Glucotrol XL) 5 MG ER tablet; Take 1 tablet by mouth Daily.  Dispense: 90 tablet; Refill: 2  -     Ambulatory Referral to Diabetic Education  -     Ambulatory Referral to Nutrition Services    5. Diabetic polyneuropathy associated with type 2 diabetes mellitus  -     Ambulatory Referral for Diabetic Eye Exam-Ophthalmology  -     gabapentin (NEURONTIN) 300 MG capsule; Take 1 capsule by mouth 3 (Three) Times a Day.  Dispense: 90 capsule; Refill: 2    Other orders  -     glucose blood test strip; Use to check glucose daily.  Dispense: 100 each; Refill: 2  -     OneTouch UltraSoft 2 Lancets misc; Use 1 Lancet Daily.  Dispense: 100 each; Refill: 0  -     fenofibrate (TRICOR) 48 MG tablet; Take 1 tablet by mouth Daily.   Dispense: 90 tablet; Refill: 2    We have decreased his metformin secondary to severe diarrhea and Glucotrol XL 5 mg daily.  He seems motivated and is active in AA meetings.  He is willing to see diabetic education and nutritionist as well as ophthalmology.  Referrals have been placed.  I have added fish oil for his hypertriglyceridemia.  We have also requested a urine microalbumin.  He will follow-up here in 3 months.  A1c will be due at that time.  Fasting lipid profile as well.  He understands fully his pancreatitis is related to alcohol use as well as hypertriglyceridemia.    I have prescribed gabapentin 300 mg p.o. 3 times daily for painful polyneuropathy.  UDS CSA requested.  PDMP reviewed.  I did indicate to him positive UDS going forward will be grounds to not prescribe gabapentin.  He voices understanding

## 2024-12-18 NOTE — LETTER
December 18, 2024     Patient: Yg Araujo   YOB: 1980   Date of Visit: 12/18/2024       To Whom It May Concern:    It is my medical opinion that Yg Araujo was seen in my office today.          Sincerely,        Tang Rios MD    CC: No Recipients

## 2024-12-30 LAB — DRUGS UR: NORMAL

## 2025-01-22 ENCOUNTER — HOSPITAL ENCOUNTER (OUTPATIENT)
Facility: HOSPITAL | Age: 45
Setting detail: OBSERVATION
Discharge: HOME | End: 2025-01-26
Attending: EMERGENCY MEDICINE | Admitting: INTERNAL MEDICINE
Payer: COMMERCIAL

## 2025-01-22 ENCOUNTER — APPOINTMENT (OUTPATIENT)
Facility: HOSPITAL | Age: 45
End: 2025-01-22
Payer: COMMERCIAL

## 2025-01-22 DIAGNOSIS — K86.1 ACUTE ON CHRONIC PANCREATITIS: Primary | ICD-10-CM

## 2025-01-22 DIAGNOSIS — K85.90 ACUTE ON CHRONIC PANCREATITIS: Primary | ICD-10-CM

## 2025-01-22 DIAGNOSIS — K85.20 ALCOHOL-INDUCED ACUTE PANCREATITIS, UNSPECIFIED COMPLICATION STATUS: ICD-10-CM

## 2025-01-22 LAB
ALBUMIN SERPL-MCNC: 4 G/DL (ref 3.5–5.2)
ALBUMIN/GLOB SERPL: 1.4 G/DL
ALP SERPL-CCNC: 59 U/L (ref 39–117)
ALT SERPL W P-5'-P-CCNC: <5 U/L (ref 1–41)
ANION GAP SERPL CALCULATED.3IONS-SCNC: 10.4 MMOL/L (ref 5–15)
AST SERPL-CCNC: 18 U/L (ref 1–40)
BASOPHILS # BLD AUTO: 0.04 10*3/MM3 (ref 0–0.2)
BASOPHILS NFR BLD AUTO: 0.6 % (ref 0–1.5)
BILIRUB SERPL-MCNC: 0.8 MG/DL (ref 0–1.2)
BUN SERPL-MCNC: 12 MG/DL (ref 6–20)
BUN/CREAT SERPL: 12.6 (ref 7–25)
CALCIUM SPEC-SCNC: 9.4 MG/DL (ref 8.6–10.5)
CHLORIDE SERPL-SCNC: 99 MMOL/L (ref 98–107)
CO2 SERPL-SCNC: 27.6 MMOL/L (ref 22–29)
CREAT SERPL-MCNC: 0.95 MG/DL (ref 0.76–1.27)
D-LACTATE SERPL-SCNC: 1.6 MMOL/L (ref 0.5–2)
DEPRECATED RDW RBC AUTO: 41.6 FL (ref 37–54)
EGFRCR SERPLBLD CKD-EPI 2021: 101.9 ML/MIN/1.73
EOSINOPHIL # BLD AUTO: 0.15 10*3/MM3 (ref 0–0.4)
EOSINOPHIL NFR BLD AUTO: 2.1 % (ref 0.3–6.2)
ERYTHROCYTE [DISTWIDTH] IN BLOOD BY AUTOMATED COUNT: 12.7 % (ref 12.3–15.4)
GLOBULIN UR ELPH-MCNC: 2.9 GM/DL
GLUCOSE SERPL-MCNC: 194 MG/DL (ref 65–99)
HCT VFR BLD AUTO: 47.8 % (ref 37.5–51)
HGB BLD-MCNC: 15.5 G/DL (ref 13–17.7)
HOLD SPECIMEN: NORMAL
IMM GRANULOCYTES # BLD AUTO: 0.03 10*3/MM3 (ref 0–0.05)
IMM GRANULOCYTES NFR BLD AUTO: 0.4 % (ref 0–0.5)
LIPASE SERPL-CCNC: 210 U/L (ref 13–60)
LYMPHOCYTES # BLD AUTO: 3.12 10*3/MM3 (ref 0.7–3.1)
LYMPHOCYTES NFR BLD AUTO: 43.8 % (ref 19.6–45.3)
MCH RBC QN AUTO: 28.4 PG (ref 26.6–33)
MCHC RBC AUTO-ENTMCNC: 32.4 G/DL (ref 31.5–35.7)
MCV RBC AUTO: 87.5 FL (ref 79–97)
MONOCYTES # BLD AUTO: 0.61 10*3/MM3 (ref 0.1–0.9)
MONOCYTES NFR BLD AUTO: 8.6 % (ref 5–12)
NEUTROPHILS NFR BLD AUTO: 3.17 10*3/MM3 (ref 1.7–7)
NEUTROPHILS NFR BLD AUTO: 44.5 % (ref 42.7–76)
PLATELET # BLD AUTO: 282 10*3/MM3 (ref 140–450)
PMV BLD AUTO: 10.8 FL (ref 6–12)
POTASSIUM SERPL-SCNC: 4.1 MMOL/L (ref 3.5–5.2)
PROT SERPL-MCNC: 6.9 G/DL (ref 6–8.5)
RBC # BLD AUTO: 5.46 10*6/MM3 (ref 4.14–5.8)
SODIUM SERPL-SCNC: 137 MMOL/L (ref 136–145)
WBC NRBC COR # BLD AUTO: 7.12 10*3/MM3 (ref 3.4–10.8)
WHOLE BLOOD HOLD COAG: NORMAL
WHOLE BLOOD HOLD SPECIMEN: NORMAL

## 2025-01-22 PROCEDURE — 96375 TX/PRO/DX INJ NEW DRUG ADDON: CPT

## 2025-01-22 PROCEDURE — 96374 THER/PROPH/DIAG INJ IV PUSH: CPT

## 2025-01-22 PROCEDURE — 25510000001 IOPAMIDOL 61 % SOLUTION: Performed by: EMERGENCY MEDICINE

## 2025-01-22 PROCEDURE — 83605 ASSAY OF LACTIC ACID: CPT

## 2025-01-22 PROCEDURE — 99285 EMERGENCY DEPT VISIT HI MDM: CPT

## 2025-01-22 PROCEDURE — 82150 ASSAY OF AMYLASE: CPT

## 2025-01-22 PROCEDURE — 85025 COMPLETE CBC W/AUTO DIFF WBC: CPT

## 2025-01-22 PROCEDURE — 74177 CT ABD & PELVIS W/CONTRAST: CPT

## 2025-01-22 PROCEDURE — 96361 HYDRATE IV INFUSION ADD-ON: CPT

## 2025-01-22 PROCEDURE — 83690 ASSAY OF LIPASE: CPT

## 2025-01-22 PROCEDURE — 25810000003 SODIUM CHLORIDE 0.9 % SOLUTION

## 2025-01-22 PROCEDURE — 25010000002 ONDANSETRON PER 1 MG

## 2025-01-22 PROCEDURE — 25010000002 MORPHINE PER 10 MG: Performed by: EMERGENCY MEDICINE

## 2025-01-22 PROCEDURE — 80053 COMPREHEN METABOLIC PANEL: CPT

## 2025-01-22 RX ORDER — ONDANSETRON 2 MG/ML
4 INJECTION INTRAMUSCULAR; INTRAVENOUS ONCE
Status: COMPLETED | OUTPATIENT
Start: 2025-01-22 | End: 2025-01-22

## 2025-01-22 RX ORDER — SODIUM CHLORIDE 0.9 % (FLUSH) 0.9 %
10 SYRINGE (ML) INJECTION AS NEEDED
Status: DISCONTINUED | OUTPATIENT
Start: 2025-01-22 | End: 2025-01-26 | Stop reason: HOSPADM

## 2025-01-22 RX ORDER — IOPAMIDOL 612 MG/ML
100 INJECTION, SOLUTION INTRAVASCULAR
Status: COMPLETED | OUTPATIENT
Start: 2025-01-22 | End: 2025-01-22

## 2025-01-22 RX ADMIN — ONDANSETRON 4 MG: 2 INJECTION INTRAMUSCULAR; INTRAVENOUS at 22:45

## 2025-01-22 RX ADMIN — MORPHINE SULFATE 4 MG: 4 INJECTION, SOLUTION INTRAMUSCULAR; INTRAVENOUS at 22:45

## 2025-01-22 RX ADMIN — SODIUM CHLORIDE 1000 ML: 9 INJECTION, SOLUTION INTRAVENOUS at 22:45

## 2025-01-22 RX ADMIN — IOPAMIDOL 75 ML: 612 INJECTION, SOLUTION INTRAVENOUS at 23:00

## 2025-01-22 NOTE — LETTER
January 26, 2025      [facility] [address]  [address] [facility] [address]  [phone]          Patient: Yg Araujo   YOB: 1980   Date of Visit: 1/22/2025       To Whom It May Concern:    Yg Araujo was seen at [address] from 1/22/2025 through 1/26/2025.     Please excuse Yg Araujo from work during this time.         Sincerely,           Rachel Perez R.N.

## 2025-01-23 PROBLEM — K85.20 ACUTE ALCOHOLIC PANCREATITIS: Status: ACTIVE | Noted: 2025-01-23

## 2025-01-23 PROBLEM — R52 INTRACTABLE PAIN: Status: ACTIVE | Noted: 2025-01-23

## 2025-01-23 LAB
ALBUMIN SERPL-MCNC: 3.6 G/DL (ref 3.5–5.2)
ALBUMIN/GLOB SERPL: 1.3 G/DL
ALP SERPL-CCNC: 52 U/L (ref 39–117)
ALT SERPL W P-5'-P-CCNC: <5 U/L (ref 1–41)
AMPHET+METHAMPHET UR QL: NEGATIVE
AMPHETAMINES UR QL: NEGATIVE
AMYLASE SERPL-CCNC: 113 U/L (ref 28–100)
ANION GAP SERPL CALCULATED.3IONS-SCNC: 9.2 MMOL/L (ref 5–15)
AST SERPL-CCNC: 16 U/L (ref 1–40)
BARBITURATES UR QL SCN: NEGATIVE
BASOPHILS # BLD AUTO: 0.04 10*3/MM3 (ref 0–0.2)
BASOPHILS NFR BLD AUTO: 0.5 % (ref 0–1.5)
BENZODIAZ UR QL SCN: NEGATIVE
BILIRUB SERPL-MCNC: 0.8 MG/DL (ref 0–1.2)
BILIRUB UR QL STRIP: NEGATIVE
BUN SERPL-MCNC: 9 MG/DL (ref 6–20)
BUN/CREAT SERPL: 12.2 (ref 7–25)
BUPRENORPHINE SERPL-MCNC: NEGATIVE NG/ML
CALCIUM SPEC-SCNC: 8.5 MG/DL (ref 8.6–10.5)
CANNABINOIDS SERPL QL: POSITIVE
CHLORIDE SERPL-SCNC: 104 MMOL/L (ref 98–107)
CHOLEST SERPL-MCNC: 172 MG/DL (ref 0–200)
CLARITY UR: CLEAR
CO2 SERPL-SCNC: 25.8 MMOL/L (ref 22–29)
COCAINE UR QL: POSITIVE
COLOR UR: YELLOW
CREAT SERPL-MCNC: 0.74 MG/DL (ref 0.76–1.27)
DEPRECATED RDW RBC AUTO: 42 FL (ref 37–54)
EGFRCR SERPLBLD CKD-EPI 2021: 115.3 ML/MIN/1.73
EOSINOPHIL # BLD AUTO: 0.22 10*3/MM3 (ref 0–0.4)
EOSINOPHIL NFR BLD AUTO: 3 % (ref 0.3–6.2)
ERYTHROCYTE [DISTWIDTH] IN BLOOD BY AUTOMATED COUNT: 12.7 % (ref 12.3–15.4)
FENTANYL UR-MCNC: NEGATIVE NG/ML
GLOBULIN UR ELPH-MCNC: 2.8 GM/DL
GLUCOSE BLDC GLUCOMTR-MCNC: 144 MG/DL (ref 70–130)
GLUCOSE BLDC GLUCOMTR-MCNC: 180 MG/DL (ref 70–130)
GLUCOSE SERPL-MCNC: 152 MG/DL (ref 65–99)
GLUCOSE UR STRIP-MCNC: ABNORMAL MG/DL
HCT VFR BLD AUTO: 44.5 % (ref 37.5–51)
HDLC SERPL-MCNC: 26 MG/DL (ref 40–60)
HGB BLD-MCNC: 14.5 G/DL (ref 13–17.7)
HGB UR QL STRIP.AUTO: NEGATIVE
IMM GRANULOCYTES # BLD AUTO: 0.03 10*3/MM3 (ref 0–0.05)
IMM GRANULOCYTES NFR BLD AUTO: 0.4 % (ref 0–0.5)
KETONES UR QL STRIP: NEGATIVE
LDLC SERPL CALC-MCNC: 111 MG/DL (ref 0–100)
LDLC/HDLC SERPL: 4.08 {RATIO}
LEUKOCYTE ESTERASE UR QL STRIP.AUTO: NEGATIVE
LIPASE SERPL-CCNC: 247 U/L (ref 13–60)
LYMPHOCYTES # BLD AUTO: 3.67 10*3/MM3 (ref 0.7–3.1)
LYMPHOCYTES NFR BLD AUTO: 50 % (ref 19.6–45.3)
MCH RBC QN AUTO: 28.8 PG (ref 26.6–33)
MCHC RBC AUTO-ENTMCNC: 32.6 G/DL (ref 31.5–35.7)
MCV RBC AUTO: 88.3 FL (ref 79–97)
METHADONE UR QL SCN: NEGATIVE
MONOCYTES # BLD AUTO: 0.66 10*3/MM3 (ref 0.1–0.9)
MONOCYTES NFR BLD AUTO: 9 % (ref 5–12)
NEUTROPHILS NFR BLD AUTO: 2.72 10*3/MM3 (ref 1.7–7)
NEUTROPHILS NFR BLD AUTO: 37.1 % (ref 42.7–76)
NITRITE UR QL STRIP: NEGATIVE
OPIATES UR QL: POSITIVE
OXYCODONE UR QL SCN: NEGATIVE
PCP UR QL SCN: NEGATIVE
PH UR STRIP.AUTO: 6.5 [PH] (ref 5–8)
PLATELET # BLD AUTO: 263 10*3/MM3 (ref 140–450)
PMV BLD AUTO: 10.4 FL (ref 6–12)
POTASSIUM SERPL-SCNC: 4.3 MMOL/L (ref 3.5–5.2)
PROT SERPL-MCNC: 6.4 G/DL (ref 6–8.5)
PROT UR QL STRIP: NEGATIVE
RBC # BLD AUTO: 5.04 10*6/MM3 (ref 4.14–5.8)
SODIUM SERPL-SCNC: 139 MMOL/L (ref 136–145)
SP GR UR STRIP: 1.01 (ref 1–1.03)
TRICYCLICS UR QL SCN: NEGATIVE
TRIGL SERPL-MCNC: 198 MG/DL (ref 0–150)
TRIGL SERPL-MCNC: 199 MG/DL (ref 0–150)
TRIGL SERPL-MCNC: 199 MG/DL (ref 0–150)
UROBILINOGEN UR QL STRIP: ABNORMAL
VLDLC SERPL-MCNC: 35 MG/DL (ref 5–40)
WBC NRBC COR # BLD AUTO: 7.34 10*3/MM3 (ref 3.4–10.8)

## 2025-01-23 PROCEDURE — 85025 COMPLETE CBC W/AUTO DIFF WBC: CPT | Performed by: NURSE PRACTITIONER

## 2025-01-23 PROCEDURE — 63710000001 INSULIN REGULAR HUMAN PER 5 UNITS: Performed by: NURSE PRACTITIONER

## 2025-01-23 PROCEDURE — 82948 REAGENT STRIP/BLOOD GLUCOSE: CPT

## 2025-01-23 PROCEDURE — G0378 HOSPITAL OBSERVATION PER HR: HCPCS

## 2025-01-23 PROCEDURE — 80061 LIPID PANEL: CPT | Performed by: NURSE PRACTITIONER

## 2025-01-23 PROCEDURE — 84478 ASSAY OF TRIGLYCERIDES: CPT | Performed by: NURSE PRACTITIONER

## 2025-01-23 PROCEDURE — 83690 ASSAY OF LIPASE: CPT | Performed by: NURSE PRACTITIONER

## 2025-01-23 PROCEDURE — 25010000002 MORPHINE PER 10 MG: Performed by: EMERGENCY MEDICINE

## 2025-01-23 PROCEDURE — 96376 TX/PRO/DX INJ SAME DRUG ADON: CPT

## 2025-01-23 PROCEDURE — 84478 ASSAY OF TRIGLYCERIDES: CPT | Performed by: PHYSICIAN ASSISTANT

## 2025-01-23 PROCEDURE — 25010000002 THIAMINE HCL 200 MG/2ML SOLUTION: Performed by: PHYSICIAN ASSISTANT

## 2025-01-23 PROCEDURE — 81003 URINALYSIS AUTO W/O SCOPE: CPT

## 2025-01-23 PROCEDURE — 80053 COMPREHEN METABOLIC PANEL: CPT | Performed by: NURSE PRACTITIONER

## 2025-01-23 PROCEDURE — 25810000003 LACTATED RINGERS PER 1000 ML: Performed by: NURSE PRACTITIONER

## 2025-01-23 PROCEDURE — 25010000002 THIAMINE PER 100 MG: Performed by: NURSE PRACTITIONER

## 2025-01-23 PROCEDURE — 25010000002 MORPHINE PER 10 MG: Performed by: STUDENT IN AN ORGANIZED HEALTH CARE EDUCATION/TRAINING PROGRAM

## 2025-01-23 PROCEDURE — 25010000002 HYDROMORPHONE 1 MG/ML SOLUTION: Performed by: EMERGENCY MEDICINE

## 2025-01-23 PROCEDURE — 96361 HYDRATE IV INFUSION ADD-ON: CPT

## 2025-01-23 PROCEDURE — 25010000002 HYDROMORPHONE PER 4 MG: Performed by: NURSE PRACTITIONER

## 2025-01-23 PROCEDURE — 96375 TX/PRO/DX INJ NEW DRUG ADDON: CPT

## 2025-01-23 PROCEDURE — 63710000001 INSULIN LISPRO (HUMAN) PER 5 UNITS: Performed by: NURSE PRACTITIONER

## 2025-01-23 PROCEDURE — 80307 DRUG TEST PRSMV CHEM ANLYZR: CPT | Performed by: PHYSICIAN ASSISTANT

## 2025-01-23 PROCEDURE — 99222 1ST HOSP IP/OBS MODERATE 55: CPT | Performed by: NURSE PRACTITIONER

## 2025-01-23 PROCEDURE — 25810000003 LACTATED RINGERS PER 1000 ML: Performed by: PHYSICIAN ASSISTANT

## 2025-01-23 RX ORDER — NICOTINE 21 MG/24HR
1 PATCH, TRANSDERMAL 24 HOURS TRANSDERMAL
Status: DISCONTINUED | OUTPATIENT
Start: 2025-01-23 | End: 2025-01-26 | Stop reason: HOSPADM

## 2025-01-23 RX ORDER — NICOTINE 21 MG/24HR
1 PATCH, TRANSDERMAL 24 HOURS TRANSDERMAL
Status: DISCONTINUED | OUTPATIENT
Start: 2025-01-23 | End: 2025-01-23

## 2025-01-23 RX ORDER — IBUPROFEN 600 MG/1
1 TABLET ORAL
Status: DISCONTINUED | OUTPATIENT
Start: 2025-01-23 | End: 2025-01-26 | Stop reason: HOSPADM

## 2025-01-23 RX ORDER — DEXTROSE MONOHYDRATE 25 G/50ML
25 INJECTION, SOLUTION INTRAVENOUS
Status: DISCONTINUED | OUTPATIENT
Start: 2025-01-23 | End: 2025-01-26 | Stop reason: HOSPADM

## 2025-01-23 RX ORDER — DIAZEPAM 10 MG/2ML
15 INJECTION, SOLUTION INTRAMUSCULAR; INTRAVENOUS
Status: DISCONTINUED | OUTPATIENT
Start: 2025-01-23 | End: 2025-01-25

## 2025-01-23 RX ORDER — FENOFIBRATE 48 MG/1
48 TABLET, COATED ORAL DAILY
Status: DISCONTINUED | OUTPATIENT
Start: 2025-01-23 | End: 2025-01-26 | Stop reason: HOSPADM

## 2025-01-23 RX ORDER — ACETAMINOPHEN 650 MG/1
650 SUPPOSITORY RECTAL EVERY 4 HOURS PRN
Status: DISCONTINUED | OUTPATIENT
Start: 2025-01-23 | End: 2025-01-26 | Stop reason: HOSPADM

## 2025-01-23 RX ORDER — ACETAMINOPHEN 160 MG/5ML
650 SOLUTION ORAL EVERY 4 HOURS PRN
Status: DISCONTINUED | OUTPATIENT
Start: 2025-01-23 | End: 2025-01-26 | Stop reason: HOSPADM

## 2025-01-23 RX ORDER — ONDANSETRON 2 MG/ML
4 INJECTION INTRAMUSCULAR; INTRAVENOUS EVERY 6 HOURS PRN
Status: DISCONTINUED | OUTPATIENT
Start: 2025-01-23 | End: 2025-01-26 | Stop reason: HOSPADM

## 2025-01-23 RX ORDER — ONDANSETRON 2 MG/ML
4 INJECTION INTRAMUSCULAR; INTRAVENOUS EVERY 6 HOURS PRN
Status: DISCONTINUED | OUTPATIENT
Start: 2025-01-23 | End: 2025-01-23 | Stop reason: SDUPTHER

## 2025-01-23 RX ORDER — ONDANSETRON 4 MG/1
4 TABLET, ORALLY DISINTEGRATING ORAL EVERY 6 HOURS PRN
Status: DISCONTINUED | OUTPATIENT
Start: 2025-01-23 | End: 2025-01-26 | Stop reason: HOSPADM

## 2025-01-23 RX ORDER — NICOTINE POLACRILEX 4 MG
15 LOZENGE BUCCAL
Status: DISCONTINUED | OUTPATIENT
Start: 2025-01-23 | End: 2025-01-26 | Stop reason: HOSPADM

## 2025-01-23 RX ORDER — BISACODYL 10 MG
10 SUPPOSITORY, RECTAL RECTAL DAILY PRN
Status: DISCONTINUED | OUTPATIENT
Start: 2025-01-23 | End: 2025-01-26 | Stop reason: HOSPADM

## 2025-01-23 RX ORDER — NALOXONE HCL 0.4 MG/ML
0.4 VIAL (ML) INJECTION
Status: DISCONTINUED | OUTPATIENT
Start: 2025-01-23 | End: 2025-01-26 | Stop reason: HOSPADM

## 2025-01-23 RX ORDER — GABAPENTIN 300 MG/1
300 CAPSULE ORAL EVERY 12 HOURS SCHEDULED
Status: DISCONTINUED | OUTPATIENT
Start: 2025-01-23 | End: 2025-01-26 | Stop reason: HOSPADM

## 2025-01-23 RX ORDER — DIAZEPAM 5 MG/1
10 TABLET ORAL
Status: DISCONTINUED | OUTPATIENT
Start: 2025-01-23 | End: 2025-01-25

## 2025-01-23 RX ORDER — SODIUM CHLORIDE, SODIUM LACTATE, POTASSIUM CHLORIDE, CALCIUM CHLORIDE 600; 310; 30; 20 MG/100ML; MG/100ML; MG/100ML; MG/100ML
75 INJECTION, SOLUTION INTRAVENOUS CONTINUOUS
Status: ACTIVE | OUTPATIENT
Start: 2025-01-23 | End: 2025-01-25

## 2025-01-23 RX ORDER — DIAZEPAM 10 MG/2ML
10 INJECTION, SOLUTION INTRAMUSCULAR; INTRAVENOUS
Status: DISCONTINUED | OUTPATIENT
Start: 2025-01-23 | End: 2025-01-25

## 2025-01-23 RX ORDER — DIAZEPAM 5 MG/1
20 TABLET ORAL
Status: DISCONTINUED | OUTPATIENT
Start: 2025-01-23 | End: 2025-01-25

## 2025-01-23 RX ORDER — SODIUM CHLORIDE, SODIUM LACTATE, POTASSIUM CHLORIDE, CALCIUM CHLORIDE 600; 310; 30; 20 MG/100ML; MG/100ML; MG/100ML; MG/100ML
250 INJECTION, SOLUTION INTRAVENOUS CONTINUOUS
Status: DISCONTINUED | OUTPATIENT
Start: 2025-01-23 | End: 2025-01-24

## 2025-01-23 RX ORDER — HYDROMORPHONE HYDROCHLORIDE 1 MG/ML
0.5 INJECTION, SOLUTION INTRAMUSCULAR; INTRAVENOUS; SUBCUTANEOUS
Status: DISPENSED | OUTPATIENT
Start: 2025-01-23 | End: 2025-01-24

## 2025-01-23 RX ORDER — ACETAMINOPHEN 325 MG/1
650 TABLET ORAL EVERY 4 HOURS PRN
Status: DISCONTINUED | OUTPATIENT
Start: 2025-01-23 | End: 2025-01-26 | Stop reason: HOSPADM

## 2025-01-23 RX ORDER — SODIUM CHLORIDE 0.9 % (FLUSH) 0.9 %
10 SYRINGE (ML) INJECTION EVERY 12 HOURS SCHEDULED
Status: DISCONTINUED | OUTPATIENT
Start: 2025-01-23 | End: 2025-01-26 | Stop reason: HOSPADM

## 2025-01-23 RX ORDER — INSULIN LISPRO 100 [IU]/ML
2-7 INJECTION, SOLUTION INTRAVENOUS; SUBCUTANEOUS
Status: DISCONTINUED | OUTPATIENT
Start: 2025-01-23 | End: 2025-01-26 | Stop reason: HOSPADM

## 2025-01-23 RX ORDER — DIAZEPAM 5 MG/1
15 TABLET ORAL
Status: DISCONTINUED | OUTPATIENT
Start: 2025-01-23 | End: 2025-01-25

## 2025-01-23 RX ORDER — DIAZEPAM 10 MG/2ML
20 INJECTION, SOLUTION INTRAMUSCULAR; INTRAVENOUS
Status: DISCONTINUED | OUTPATIENT
Start: 2025-01-23 | End: 2025-01-25

## 2025-01-23 RX ORDER — THIAMINE HYDROCHLORIDE 100 MG/ML
200 INJECTION, SOLUTION INTRAMUSCULAR; INTRAVENOUS EVERY 8 HOURS SCHEDULED
Status: DISCONTINUED | OUTPATIENT
Start: 2025-01-23 | End: 2025-01-26 | Stop reason: HOSPADM

## 2025-01-23 RX ORDER — THIAMINE HYDROCHLORIDE 100 MG/ML
100 INJECTION, SOLUTION INTRAMUSCULAR; INTRAVENOUS DAILY
Status: DISCONTINUED | OUTPATIENT
Start: 2025-01-23 | End: 2025-01-23 | Stop reason: SDUPTHER

## 2025-01-23 RX ORDER — TRAMADOL HYDROCHLORIDE 50 MG/1
50 TABLET ORAL EVERY 6 HOURS PRN
Status: DISCONTINUED | OUTPATIENT
Start: 2025-01-23 | End: 2025-01-25

## 2025-01-23 RX ORDER — BISACODYL 5 MG/1
5 TABLET, DELAYED RELEASE ORAL DAILY PRN
Status: DISCONTINUED | OUTPATIENT
Start: 2025-01-23 | End: 2025-01-26 | Stop reason: HOSPADM

## 2025-01-23 RX ORDER — SODIUM CHLORIDE 9 MG/ML
40 INJECTION, SOLUTION INTRAVENOUS AS NEEDED
Status: DISCONTINUED | OUTPATIENT
Start: 2025-01-23 | End: 2025-01-26 | Stop reason: HOSPADM

## 2025-01-23 RX ORDER — AMOXICILLIN 250 MG
2 CAPSULE ORAL 2 TIMES DAILY
Status: DISCONTINUED | OUTPATIENT
Start: 2025-01-23 | End: 2025-01-26 | Stop reason: HOSPADM

## 2025-01-23 RX ORDER — FOLIC ACID 1 MG/1
1 TABLET ORAL DAILY
Status: DISCONTINUED | OUTPATIENT
Start: 2025-01-23 | End: 2025-01-26 | Stop reason: HOSPADM

## 2025-01-23 RX ORDER — NICOTINE POLACRILEX 4 MG
15 LOZENGE BUCCAL
Status: DISCONTINUED | OUTPATIENT
Start: 2025-01-23 | End: 2025-01-23 | Stop reason: SDUPTHER

## 2025-01-23 RX ORDER — SODIUM CHLORIDE 0.9 % (FLUSH) 0.9 %
10 SYRINGE (ML) INJECTION AS NEEDED
Status: DISCONTINUED | OUTPATIENT
Start: 2025-01-23 | End: 2025-01-26 | Stop reason: HOSPADM

## 2025-01-23 RX ORDER — POLYETHYLENE GLYCOL 3350 17 G/17G
17 POWDER, FOR SOLUTION ORAL DAILY PRN
Status: DISCONTINUED | OUTPATIENT
Start: 2025-01-23 | End: 2025-01-26 | Stop reason: HOSPADM

## 2025-01-23 RX ADMIN — INSULIN LISPRO 2 UNITS: 100 INJECTION, SOLUTION INTRAVENOUS; SUBCUTANEOUS at 20:30

## 2025-01-23 RX ADMIN — HYDROMORPHONE HYDROCHLORIDE 1 MG: 1 INJECTION, SOLUTION INTRAMUSCULAR; INTRAVENOUS; SUBCUTANEOUS at 04:05

## 2025-01-23 RX ADMIN — MORPHINE SULFATE 4 MG: 4 INJECTION, SOLUTION INTRAMUSCULAR; INTRAVENOUS at 12:06

## 2025-01-23 RX ADMIN — Medication 10 ML: at 15:44

## 2025-01-23 RX ADMIN — Medication 10 ML: at 20:31

## 2025-01-23 RX ADMIN — HYDROMORPHONE HYDROCHLORIDE 1 MG: 1 INJECTION, SOLUTION INTRAMUSCULAR; INTRAVENOUS; SUBCUTANEOUS at 07:57

## 2025-01-23 RX ADMIN — THIAMINE HYDROCHLORIDE 200 MG: 100 INJECTION, SOLUTION INTRAMUSCULAR; INTRAVENOUS at 20:30

## 2025-01-23 RX ADMIN — SODIUM CHLORIDE, POTASSIUM CHLORIDE, SODIUM LACTATE AND CALCIUM CHLORIDE 250 ML/HR: 600; 310; 30; 20 INJECTION, SOLUTION INTRAVENOUS at 12:15

## 2025-01-23 RX ADMIN — TRAMADOL HYDROCHLORIDE 50 MG: 50 TABLET, COATED ORAL at 15:55

## 2025-01-23 RX ADMIN — MORPHINE SULFATE 4 MG: 4 INJECTION, SOLUTION INTRAMUSCULAR; INTRAVENOUS at 00:27

## 2025-01-23 RX ADMIN — SODIUM CHLORIDE, POTASSIUM CHLORIDE, SODIUM LACTATE AND CALCIUM CHLORIDE 250 ML/HR: 600; 310; 30; 20 INJECTION, SOLUTION INTRAVENOUS at 15:36

## 2025-01-23 RX ADMIN — HYDROMORPHONE HYDROCHLORIDE 0.5 MG: 1 INJECTION, SOLUTION INTRAMUSCULAR; INTRAVENOUS; SUBCUTANEOUS at 21:58

## 2025-01-23 RX ADMIN — GABAPENTIN 300 MG: 300 CAPSULE ORAL at 20:30

## 2025-01-23 RX ADMIN — INSULIN HUMAN 2 UNITS: 100 INJECTION, SOLUTION PARENTERAL at 01:00

## 2025-01-23 RX ADMIN — THIAMINE HYDROCHLORIDE 100 MG: 100 INJECTION, SOLUTION INTRAMUSCULAR; INTRAVENOUS at 12:07

## 2025-01-23 RX ADMIN — FOLIC ACID 1 MG: 1 TABLET ORAL at 17:51

## 2025-01-23 RX ADMIN — SODIUM CHLORIDE, SODIUM LACTATE, POTASSIUM CHLORIDE, CALCIUM CHLORIDE 150 ML/HR: 20; 30; 600; 310 INJECTION, SOLUTION INTRAVENOUS at 01:01

## 2025-01-23 RX ADMIN — SODIUM CHLORIDE, SODIUM LACTATE, POTASSIUM CHLORIDE, CALCIUM CHLORIDE 150 ML/HR: 20; 30; 600; 310 INJECTION, SOLUTION INTRAVENOUS at 07:52

## 2025-01-23 RX ADMIN — FENOFIBRATE 48 MG: 48 TABLET ORAL at 20:30

## 2025-01-23 RX ADMIN — HYDROMORPHONE HYDROCHLORIDE 0.5 MG: 1 INJECTION, SOLUTION INTRAMUSCULAR; INTRAVENOUS; SUBCUTANEOUS at 17:51

## 2025-01-23 RX ADMIN — Medication 10 ML: at 01:01

## 2025-01-23 RX ADMIN — SENNOSIDES AND DOCUSATE SODIUM 2 TABLET: 50; 8.6 TABLET ORAL at 20:34

## 2025-01-23 NOTE — NURSING NOTE
Patient transferred to [facility] room N602, VSS, report given to April RN. Patients pain down to 6/10 post morphine 4mg IV at around noon. Transported via EMS. Stable

## 2025-01-23 NOTE — H&P
[facility] Medicine Services  HISTORY AND PHYSICAL    Patient Name: Yg Araujo  : 1980  MRN: [id number]  Primary Care Physician: Tang Rios MD  Date of admission: 2025      Subjective   Subjective     Chief Complaint:  RUQ pain     HPI:  Yg Araujo is a 44 y.o. male with PMH of alcohol use, GERD, DM, HTN, HLD, pancreatitis, substance abuse. Patient had been having RUQ pain for about two weeks. He had some N/V on  and nose bleed which prompted him to go to the ED at [facility]. He was admitted for pancreatitis and was transferred to our facility for further care. Last admit for pancreatitis was in November. He states he has decreased his drinking and has not used cocaine. He has been taking his tricor as prescribed. He has been eating more red meat than usual. Denies any soa, cp, fever, chills or diarrhea     Labs:  creat 0.74, lipase 247, WBC 7.34      Personal History     Past Medical History:   Diagnosis Date    Alcohol abuse     Diverticulitis     GERD (gastroesophageal reflux disease)     HLD (hyperlipidemia)     HTN (hypertension)     Pancreatitis     Pancreatitis     Polysubstance abuse     Type 2 diabetes mellitus            History reviewed. No pertinent surgical history.    Family History: family history includes Diabetes in his father; Heart disease in his father; Hypertension in his father.     Social History:  reports that he has been smoking cigarettes. He started smoking about 30 years ago. He has a 45.1 pack-year smoking history. He has never used smokeless tobacco. He reports current alcohol use of about 24.0 standard drinks of alcohol per week. He reports current drug use. Drugs: Marijuana and Cocaine(coke).  Social History     Social History Narrative    Not on file       Medications:  Available home medication information reviewed.  OneTouch UltraSoft 2 Lancets, fenofibrate, fish oil, gabapentin, glipizide, glucose blood, metFORMIN,  ondansetron, and polyethylene glycol    No Known Allergies    Objective   Objective     Vital Signs:   Temp:  [97.7 °F (36.5 °C)-98.6 °F (37 °C)] 97.7 °F (36.5 °C)  Heart Rate:  [] 62  Resp:  [8-20] 18  BP: (102-152)/() 102/89       Physical Exam   Constitutional: Awake, alert  Eyes: PERRLA, sclerae anicteric, no conjunctival injection  HENT: NCAT, mucous membranes moist  Neck: Supple, no thyromegaly, no lymphadenopathy, trachea midline  Respiratory: Clear to auscultation bilaterally, nonlabored respirations room air   Cardiovascular: RRR, no murmurs, rubs, or gallops, palpable pedal pulses bilaterally  Gastrointestinal: Positive bowel sounds, soft, RUQ tender, nondistended  Musculoskeletal: No bilateral ankle edema, no clubbing or cyanosis to extremities  Psychiatric: Appropriate affect, cooperative  Neurologic: Oriented x 3, strength symmetric in all extremities,  speech clear  Skin: No rashes      Result Review:  I have personally reviewed the results from the time of this admission to 1/23/2025 16:52 EST and agree with these findings:  [x]  Laboratory list / accordion  []  Microbiology  [x]  Radiology  []  EKG/Telemetry   []  Cardiology/Vascular   []  Pathology  []  Old records  []  Other:  Most notable findings include:       LAB RESULTS:      Lab 01/23/25  0516 01/22/25 2245   WBC 7.34 7.12   HEMOGLOBIN 14.5 15.5   HEMATOCRIT 44.5 47.8   PLATELETS 263 282   NEUTROS ABS 2.72 3.17   IMMATURE GRANS (ABS) 0.03 0.03   LYMPHS ABS 3.67* 3.12*   MONOS ABS 0.66 0.61   EOS ABS 0.22 0.15   MCV 88.3 87.5   LACTATE  --  1.6         Lab 01/23/25  0516 01/22/25  2245   SODIUM 139 137   POTASSIUM 4.3 4.1   CHLORIDE 104 99   CO2 25.8 27.6   ANION GAP 9.2 10.4   BUN 9 12   CREATININE 0.74* 0.95   EGFR 115.3 101.9   GLUCOSE 152* 194*   CALCIUM 8.5* 9.4         Lab 01/23/25  0516 01/22/25  2245   TOTAL PROTEIN 6.4 6.9   ALBUMIN 3.6 4.0   GLOBULIN 2.8 2.9   ALT (SGPT) <5 <5   AST (SGOT) 16 18   BILIRUBIN 0.8 0.8   ALK  PHOS 52 59   AMYLASE  --  113*   LIPASE 247* 210*                     UA          4/30/2024    10:49 11/26/2024    18:16 1/23/2025    00:14   Urinalysis   Squamous Epithelial Cells, UA 0-2      Specific Gravity, UA 1.038  1.033  1.010    Ketones, UA Trace  Negative  Negative    Blood, UA Negative  Negative  Negative    Leukocytes, UA Negative  Negative  Negative    Nitrite, UA Negative  Negative  Negative    RBC, UA 0-2      WBC, UA 0-2      Bacteria, UA None Seen          Microbiology Results (last 10 days)       ** No results found for the last 240 hours. **            CT Abdomen Pelvis With Contrast    Result Date: 1/22/2025  CT ABDOMEN PELVIS W CONTRAST Date of Exam: 1/22/2025 10:50 PM EST Indication: RUQ Pain. Comparison: 11/26/2024 Technique: Axial CT images were obtained of the abdomen and pelvis following the uneventful intravenous administration of intravenous contrast. Reconstructed coronal and sagittal images were also obtained. Automated exposure control and iterative construction methods were used. Findings: Lung Bases:   The visualized lung bases and lower mediastinal structures are unremarkable. Liver: The liver appears diffusely hypodense.. No focal lesions. Biliary/Gallbladder:  The gallbladder is mildly under distended limiting evaluation. No evidence of stones.. The biliary tree is nondilated. Spleen: Spleen is normal in size and CT density. Pancreas:  Pancreas is normal. There is no evidence of pancreatic mass or peripancreatic fluid. Kidneys:  Kidneys are normal in size. There are no stones or hydronephrosis. Adrenals:  Adrenal glands are unremarkable. Retroperitoneal/Lymph Nodes/Vasculature:  No retroperitoneal adenopathy is identified. Gastrointestinal/Mesentery:  No significant stool burden identified. No evidence of hernia. There is diverticulosis of the left colon. No significant inflammatory changes. Stomach appears unremarkable. The bowel loops are non-dilated without wall thickening or  mass. The appendix appears within normal limits. No evidence of obstruction. No free air. No mesenteric fluid collections identified. Bladder:  The bladder is normal. Genital:   Unremarkable       Bony Structures:   Visualized bony structures are consistent with the patient's age.     Impression: Impression: 1.No acute intra-abdominal or intrapelvic process. 2.Hepatic steatosis. 3.Ancillary findings as described above. Electronically Signed: Santa Jaeger MD  1/22/2025 11:12 PM EST  Workstation ID: [id number]         Assessment & Plan   Assessment & Plan       Alcohol induced acute pancreatitis without necrosis or infection    Type 2 diabetes mellitus without complication, without long-term current use of insulin    Hypertriglyceridemia    GERD without esophagitis    Tobacco use    Intractable pain    Acute alcoholic pancreatitis    Alcohol induced acuate pancreatitis   Hx of polysubstance disorder  -- ethanol level was not checked on admit  -- s/p fluid bolus cont IVF's at reduced rate   -- no further N/v will start diet if not tolerating go back to npo  -- pain control with ultram and 24 hours of IV dilaudid   -- check lipid panel   -- Ct A/P no acute intra abd process, lipase 240  -- talked with [place] lab they will try and run UDS off urine they have collected there   -- CIWA protocol, folic acid replacement     DM  Diabetic neuropathy  -- LDSSI adjust as needed   -- cont home gabapentin bid per patient request     HLD  Hypertriglyceridemia   -- cont tricor and check lipid panel     Tobacco abuse   -- cont nicotine patch       VTE Prophylaxis:  Mechanical VTE prophylaxis orders are present.          CODE STATUS:    Code Status and Medical Interventions: CPR (Attempt to Resuscitate); Full Support   Ordered at: 01/23/25 0039     Level Of Support Discussed With:    Patient     Code Status (Patient has no pulse and is not breathing):    CPR (Attempt to Resuscitate)     Medical Interventions (Patient has pulse or is  breathing):    Full Support       Expected Discharge   Expected Discharge Date: 1/25/2025; Expected Discharge Time:      Carol Ybarra, APRN  01/23/25

## 2025-01-23 NOTE — PROGRESS NOTES
" [facility]     Progress Note    Patient Name: Yg Araujo  : 1980  MRN: [id number]  Primary Care Physician:  Tang Rios MD  Date of admission: 2025    Subjective   Subjective     Chief Complaint: Epigastric pain, nausea and vomiting    History of Present Illness  Patient Reports Patient is a 44-year-old male who presents the emergency department today with complaints of right upper quadrant pain. Patient states he started experiencing this on and off for the past 2 weeks. Patient describes the pain as sharp in quality in the right upper quadrant specifically today. Patient states he has a history of chronic pancreatitis due to alcohol use disorder. Pt states he is drinking 2 beers four times a week. Patient admits to smoking tobacco and marijuana daily.  Patient had nausea and vomiting yesterday prompting him to come the emergency department.  Patient has been constipated over the past couple of days with last bowel movement 3 days ago.  Patient states he used to use cocaine but has not done so in several weeks.  Patient states he did \"hold cocaine because he was cleaning his house and found some\" but states he did not use it.    Review of Systems   Gastrointestinal:  Positive for abdominal pain.   All other systems reviewed and are negative.      Objective   Objective     Vitals:   Temp:  [97.7 °F (36.5 °C)-98.6 °F (37 °C)] 97.7 °F (36.5 °C)  Heart Rate:  [] 75  Resp:  [8-20] 8  BP: (125-152)/() 125/102    Physical Exam  Vitals and nursing note reviewed.   Constitutional:       General: He is not in acute distress.     Appearance: He is not toxic-appearing.   HENT:      Head: Normocephalic and atraumatic.      Nose: Nose normal.      Mouth/Throat:      Mouth: Mucous membranes are moist.   Eyes:      Extraocular Movements: Extraocular movements intact.      Conjunctiva/sclera: Conjunctivae normal.      Pupils: Pupils are equal, round, and reactive to light.   Cardiovascular: "      Rate and Rhythm: Normal rate and regular rhythm.      Pulses: Normal pulses.      Heart sounds: Normal heart sounds. No murmur heard.  Pulmonary:      Effort: Pulmonary effort is normal.      Breath sounds: Normal breath sounds. No stridor. No wheezing or rhonchi.   Abdominal:      General: Bowel sounds are normal. There is no distension.      Palpations: Abdomen is soft.      Tenderness: There is abdominal tenderness (Epigastric).   Musculoskeletal:         General: No deformity. Normal range of motion.      Cervical back: Normal range of motion and neck supple.      Right lower leg: No edema.      Left lower leg: No edema.   Skin:     General: Skin is warm and dry.      Capillary Refill: Capillary refill takes less than 2 seconds.      Findings: No erythema or rash.   Neurological:      General: No focal deficit present.      Mental Status: He is alert and oriented to person, place, and time.      Cranial Nerves: No cranial nerve deficit.      Sensory: No sensory deficit.      Coordination: Coordination normal.      Gait: Gait normal.   Psychiatric:         Mood and Affect: Mood normal.         Behavior: Behavior normal.          Result Review    Result Review:  I have personally reviewed the results from the time of this admission to 1/23/2025 11:04 EST and agree with these findings:  [x]  Laboratory list / accordion  []  Microbiology  [x]  Radiology  []  EKG/Telemetry   []  Cardiology/Vascular   []  Pathology  [x]  Old records  []  Other:  Most notable findings include: CT abdomen pelvis shows no acute findings.  Lipase is elevated in the 200s.      Assessment & Plan   Assessment / Plan     Brief Patient Summary:  Yg Araujo is a 44 y.o. male who has a medical history of hypertension, hyperlipidemia, alcohol induced pancreatitis, polysubstance use disorder was observed in the CDU for pancreatitis and intractable pain.  Patient was found to have lipase of 210 in the emergency department.  CT abdomen  pelvis showed no acute findings.  Patient has multiple admissions to the hospital for pancreatitis.  Patient does have a history of hypertriglyceridemia.  Patient received 150 mL LR per hour throughout the night.  I increased rate to 250 mL/h.  Repeat lipase this morning increased to 246.  Patient required Dilaudid this morning due to pain.    On reevaluation patient states pain is returning.  On chart review patient has had multiple hospital stays for pancreatitis all of which required 4 to 5 days.  Patient's symptoms are not improving with 13 hours at [facility] so I discussed case with the hospitalist, Dr. Nuñez who agrees to accept patient for transfer.  She request patient be started with CIWA protocol and be given thiamine.  I discussed plan of care with patient and he is agreeable with this plan.    Active Hospital Problems:  Active Hospital Problems    Diagnosis     **Alcohol induced acute pancreatitis without necrosis or infection     Intractable pain      Plan:   #Acute alcoholic pancreatitis  #Alcohol abuse  #Polysubstance use disorder  -CT abdomen pelvis: no acute intra-abdominal or intrapelvic process.  Hepatic steatosis  -Lipase 210 -> 247  -Follow Amylase  -LR @ 250 cc/hr  -NPO x ice chips, advance as tolerated  -PRN analgesia and antiemetics  - IV thiamine 100 mg.   - CIWA protocol  - UDS pending     #T2DM  #Diabetic neuropathy  -FSBG q3 hours, SSI  -Hypoglycemia protocol  -Hold home Metformin, Glipizide, Gabapentin    #HLD   #Hypertriglyceridemia   - Continue fenofibrate  - Triglyceride level pending    #Tobacco abuse  -Nicotine patch ordered     #PPX  -SCDs    VTE Prophylaxis:  Mechanical VTE prophylaxis orders are present.    CDU stay: 11 hours    CODE STATUS:    Level Of Support Discussed With: Patient  Code Status (Patient has no pulse and is not breathing): CPR (Attempt to Resuscitate)  Medical Interventions (Patient has pulse or is breathing): Full Support    Disposition:  I expect  patient to be admitted.    RICCARDO Lozano

## 2025-01-23 NOTE — PLAN OF CARE
Goal Outcome Evaluation:  Plan of Care Reviewed With: patient        Progress: no change  Outcome Evaluation: still in pain

## 2025-01-23 NOTE — FSED PROVIDER NOTE
Subjective  History of Present Illness:    Patient is a 44-year-old male who presents the emergency department today with complaints of right upper quadrant pain.  Patient states he started experiencing this on and off for the past 2 weeks.  Patient describes the pain as sharp in quality in the right upper quadrant specifically today.  Patient states he has a history of chronic pancreatitis due to alcohol use disorder.  Patient states he does not use alcohol anymore.  Patient admits to smoking tobacco and marijuana daily.  Patient states he tried taking a gabapentin that he has for his diabetic neuropathy and this did not relieve his pain.  Patient admits to few episodes of nausea with vomiting today.  Patient admits to intermittent constipation and diarrhea at times.  Patient denies any urinary symptoms.  Patient denies any fevers or chills.  Patient states he has not had pain similar to this in the past.  Patient states it does not feel like his normal bouts of pancreatitis.  Patient is concerned about his liver.  Patient rates his pain an 8 out of 10 on the pain scale currently and sharp in quality.      Nurses Notes reviewed and agree, including vitals, allergies, social history and prior medical history.     REVIEW OF SYSTEMS: All systems reviewed and not pertinent unless noted.  Review of Systems   Constitutional:  Negative for chills, diaphoresis, fatigue and fever.   HENT:  Negative for rhinorrhea and sore throat.    Respiratory:  Negative for cough, shortness of breath and wheezing.    Cardiovascular:  Negative for chest pain and palpitations.   Gastrointestinal:  Positive for abdominal pain, constipation, diarrhea, nausea and vomiting.   Genitourinary:  Negative for dysuria and flank pain.   All other systems reviewed and are negative.      Past Medical History:   Diagnosis Date    Alcohol abuse     Diverticulitis     GERD (gastroesophageal reflux disease)     HLD (hyperlipidemia)     HTN (hypertension)   "   Pancreatitis     Pancreatitis     Polysubstance abuse     Type 2 diabetes mellitus        Allergies:    Patient has no known allergies.      History reviewed. No pertinent surgical history.      Social History     Socioeconomic History    Marital status: Single   Tobacco Use    Smoking status: Every Day     Current packs/day: 1.50     Average packs/day: 1.5 packs/day for 30.1 years (45.1 ttl pk-yrs)     Types: Cigarettes     Start date: 1/1/1995    Smokeless tobacco: Never   Vaping Use    Vaping status: Never Used   Substance and Sexual Activity    Alcohol use: Yes     Alcohol/week: 24.0 standard drinks of alcohol     Types: 24 Cans of beer per week     Comment: 6 pack every other day, binge on weekends    Drug use: Yes     Types: Marijuana, Cocaine(coke)     Comment: daily- Hasn't done cocaine since beginning of April 2017    Sexual activity: Defer         Family History   Problem Relation Age of Onset    Hypertension Father     Heart disease Father     Diabetes Father        Objective  Physical Exam:  /97   Pulse 83   Temp 98.5 °F (36.9 °C) (Oral)   Resp 20   Ht 175.3 cm (69\")   Wt 95.1 kg (209 lb 11.2 oz)   SpO2 100%   BMI 30.97 kg/m²      Physical Exam  Vitals and nursing note reviewed.   Constitutional:       General: He is not in acute distress.     Appearance: He is well-developed. He is obese. He is not ill-appearing, toxic-appearing or diaphoretic.   HENT:      Head: Normocephalic and atraumatic.      Mouth/Throat:      Mouth: Mucous membranes are moist.   Eyes:      Extraocular Movements: Extraocular movements intact.      Pupils: Pupils are equal, round, and reactive to light.   Cardiovascular:      Rate and Rhythm: Normal rate and regular rhythm.      Heart sounds: Normal heart sounds.   Pulmonary:      Effort: Pulmonary effort is normal.      Breath sounds: Normal breath sounds. No stridor. No wheezing, rhonchi or rales.   Abdominal:      General: Bowel sounds are normal.      " Palpations: Abdomen is soft. There is no mass.      Tenderness: There is abdominal tenderness in the right upper quadrant. Positive signs include Avelar's sign. Negative signs include Rovsing's sign, McBurney's sign and psoas sign.   Skin:     General: Skin is warm.      Capillary Refill: Capillary refill takes less than 2 seconds.   Neurological:      General: No focal deficit present.      Mental Status: He is alert and oriented to person, place, and time.   Psychiatric:         Mood and Affect: Mood normal.         Behavior: Behavior normal.         Procedures    ED Course:    ED Course as of 01/23/25 0103   Wed Jan 22, 2025   2328 Lipase(!): 210 [MV]      ED Course User Index  [MV] Mirta Cooley PA-C       Lab Results (last 24 hours)       Procedure Component Value Units Date/Time    CBC & Differential [[id number]]  (Abnormal) Collected: 01/22/25 2245    Specimen: Blood Updated: 01/22/25 2330    Narrative:      The following orders were created for panel order CBC & Differential.  Procedure                               Abnormality         Status                     ---------                               -----------         ------                     CBC Auto Differential[[id number]]        Abnormal            Final result                 Please view results for these tests on the individual orders.    Comprehensive Metabolic Panel [[id number]]  (Abnormal) Collected: 01/22/25 2245    Specimen: Blood Updated: 01/22/25 2328     Glucose 194 mg/dL      BUN 12 mg/dL      Creatinine 0.95 mg/dL      Sodium 137 mmol/L      Potassium 4.1 mmol/L      Chloride 99 mmol/L      CO2 27.6 mmol/L      Calcium 9.4 mg/dL      Total Protein 6.9 g/dL      Albumin 4.0 g/dL      ALT (SGPT) <5 U/L      AST (SGOT) 18 U/L      Alkaline Phosphatase 59 U/L      Total Bilirubin 0.8 mg/dL      Globulin 2.9 gm/dL      A/G Ratio 1.4 g/dL      BUN/Creatinine Ratio 12.6     Anion Gap 10.4 mmol/L      eGFR 101.9 mL/min/1.73     Narrative:       GFR Categories in Chronic Kidney Disease (CKD)      GFR Category          GFR (mL/min/1.73)    Interpretation  G1                     90 or greater         Normal or high (1)  G2                      60-89                Mild decrease (1)  G3a                   45-59                Mild to moderate decrease  G3b                   30-44                Moderate to severe decrease  G4                    15-29                Severe decrease  G5                    14 or less           Kidney failure          (1)In the absence of evidence of kidney disease, neither GFR category G1 or G2 fulfill the criteria for CKD.    eGFR calculation 2021 CKD-EPI creatinine equation, which does not include race as a factor    Lipase [[id number]]  (Abnormal) Collected: 01/22/25 2245    Specimen: Blood Updated: 01/22/25 2327     Lipase 210 U/L     Lactic Acid, Plasma [[id number]]  (Normal) Collected: 01/22/25 2245    Specimen: Blood Updated: 01/22/25 2328     Lactate 1.6 mmol/L     Amylase [[id number]] Collected: 01/22/25 2245    Specimen: Blood Updated: 01/22/25 2256    CBC Auto Differential [[id number]]  (Abnormal) Collected: 01/22/25 2245    Specimen: Blood Updated: 01/22/25 2330     WBC 7.12 10*3/mm3      RBC 5.46 10*6/mm3      Hemoglobin 15.5 g/dL      Hematocrit 47.8 %      MCV 87.5 fL      MCH 28.4 pg      MCHC 32.4 g/dL      RDW 12.7 %      RDW-SD 41.6 fl      MPV 10.8 fL      Platelets 282 10*3/mm3      Neutrophil % 44.5 %      Lymphocyte % 43.8 %      Monocyte % 8.6 %      Eosinophil % 2.1 %      Basophil % 0.6 %      Immature Grans % 0.4 %      Neutrophils, Absolute 3.17 10*3/mm3      Lymphocytes, Absolute 3.12 10*3/mm3      Monocytes, Absolute 0.61 10*3/mm3      Eosinophils, Absolute 0.15 10*3/mm3      Basophils, Absolute 0.04 10*3/mm3      Immature Grans, Absolute 0.03 10*3/mm3     Urinalysis With Microscopic If Indicated (No Culture) - Urine, Clean Catch [[id number]]  (Abnormal) Collected: 01/23/25 0014    Specimen: Urine,  Clean Catch Updated: 01/23/25 0032     Color, UA Yellow     Appearance, UA Clear     pH, UA 6.5     Specific Gravity, UA 1.010     Glucose,  mg/dL (2+)     Ketones, UA Negative     Bilirubin, UA Negative     Blood, UA Negative     Protein, UA Negative     Leuk Esterase, UA Negative     Nitrite, UA Negative     Urobilinogen, UA 0.2 E.U./dL    Narrative:      Urine microscopic not indicated.             CT Abdomen Pelvis With Contrast    Result Date: 1/22/2025  CT ABDOMEN PELVIS W CONTRAST Date of Exam: 1/22/2025 10:50 PM EST Indication: RUQ Pain. Comparison: 11/26/2024 Technique: Axial CT images were obtained of the abdomen and pelvis following the uneventful intravenous administration of intravenous contrast. Reconstructed coronal and sagittal images were also obtained. Automated exposure control and iterative construction methods were used. Findings: Lung Bases:   The visualized lung bases and lower mediastinal structures are unremarkable. Liver: The liver appears diffusely hypodense.. No focal lesions. Biliary/Gallbladder:  The gallbladder is mildly under distended limiting evaluation. No evidence of stones.. The biliary tree is nondilated. Spleen: Spleen is normal in size and CT density. Pancreas:  Pancreas is normal. There is no evidence of pancreatic mass or peripancreatic fluid. Kidneys:  Kidneys are normal in size. There are no stones or hydronephrosis. Adrenals:  Adrenal glands are unremarkable. Retroperitoneal/Lymph Nodes/Vasculature:  No retroperitoneal adenopathy is identified. Gastrointestinal/Mesentery:  No significant stool burden identified. No evidence of hernia. There is diverticulosis of the left colon. No significant inflammatory changes. Stomach appears unremarkable. The bowel loops are non-dilated without wall thickening or mass. The appendix appears within normal limits. No evidence of obstruction. No free air. No mesenteric fluid collections identified. Bladder:  The bladder is normal.  Genital:   Unremarkable       Bony Structures:   Visualized bony structures are consistent with the patient's age.     Impression: Impression: 1.No acute intra-abdominal or intrapelvic process. 2.Hepatic steatosis. 3.Ancillary findings as described above. Electronically Signed: Santa Jaeger MD  1/22/2025 11:12 PM EST  Workstation ID: [id number]        MDM    Initial impression of presenting illness: Right upper quadrant pain    DDX: includes but is not limited to: Cholelithiasis versus cholecystitis versus hepatic steatosis versus hepatitis versus electrolyte abnormality versus pancreatitis    Patient arrives private vehicle with vitals indicating hypertension interpreted by myself.     Pertinent features from physical exam: Positive Avelar sign upon physical exam.    Initial diagnostic plan: CBC, CMP, lipase, urinalysis, CT scan, pain control    Results from initial plan were reviewed and interpreted by me revealing patient had an elevated lipase at 210    Diagnostic information from other sources: Previous medical records    Interventions / Re-evaluation: Patient had mildly improved pain after liter normal saline, IV Zofran and IV morphine.  Patient was requesting more pain medication.    Medications   sodium chloride 0.9 % flush 10 mL (has no administration in time range)   sodium chloride 0.9 % flush 10 mL (has no administration in time range)   sodium chloride 0.9 % flush 10 mL (10 mL Intravenous Given 1/23/25 0101)   sodium chloride 0.9 % flush 10 mL (has no administration in time range)   sodium chloride 0.9 % infusion 40 mL (has no administration in time range)   Potassium Replacement - Follow Nurse / BPA Driven Protocol (has no administration in time range)   Magnesium Standard Dose Replacement - Follow Nurse / BPA Driven Protocol (has no administration in time range)   Phosphorus Replacement - Follow Nurse / BPA Driven Protocol (has no administration in time range)   Calcium Replacement - Follow Nurse / BPA  Driven Protocol (has no administration in time range)   lactated ringers infusion (150 mL/hr Intravenous New Bag 1/23/25 0101)   ondansetron (ZOFRAN) injection 4 mg (has no administration in time range)   HYDROmorphone (DILAUDID) injection 1 mg (has no administration in time range)   dextrose (GLUTOSE) oral gel 15 g (has no administration in time range)   dextrose (D50W) (25 g/50 mL) IV injection 25 g (has no administration in time range)   glucagon (GLUCAGEN) injection 1 mg (has no administration in time range)   insulin regular (humuLIN R,novoLIN R) injection 2-9 Units (2 Units Subcutaneous Given 1/23/25 0100)   nicotine (NICODERM CQ) 21 MG/24HR patch 1 patch (1 patch Transdermal Not Given 1/23/25 0101)   sodium chloride 0.9 % bolus 1,000 mL (0 mL Intravenous Stopped 1/23/25 0027)   ondansetron (ZOFRAN) injection 4 mg (4 mg Intravenous Given 1/22/25 2245)   morphine injection 4 mg (4 mg Intravenous Given 1/22/25 2245)   iopamidol (ISOVUE-300) 61 % injection 100 mL (75 mL Intravenous Given 1/22/25 2300)   morphine injection 4 mg (4 mg Intravenous Given 1/23/25 0027)       Results/clinical rationale were discussed with patient    Consultations/Discussion of results with other physicians: I spoke with Miky Boogie NP in the CDU and he agreed to accept the patient for continued IV fluids, pain management and care.    Data interpreted: Nursing notes reviewed, vital signs reviewed.  Labs independently interpreted by me (CBC, CMP, lipase, UA).  Imaging independently interpreted by me (CT scan).  O2 saturation: 100% on room air    Counseling: Discussed the results above with the patient regarding need for admission or discharge.  Patient understands and agrees plan of care.      Patient is a 44-year-old male who presents to the emergency department today with complaints of severe upper quadrant abdominal pain.  Patient states he is had the symptoms for the past 2 weeks.  Patient states he is had some nausea and  vomiting today.  Patient states he has a history of chronic pancreatitis due to alcohol abuse.  Patient states he has been not drinking as much.  Patient states he has a beer occasionally.  Patient states this feels slightly different than his normal pancreatitis given his right upper quadrant pain.  Patient's CBC and CMP were nonactionable.  Patient's lipase was elevated at 210.  Patient's amylase is a send out.  Patient CT scan showed no acute abnormality that would explain patient's symptoms at this time.  Patient's lipase being 3 times the normal limit and given his physical exam findings they are consistent with acute on chronic pancreatitis.  Patient was treated with a liter normal saline, IV Zofran, IV morphine.  Patient continued to have pain and requested more medication.  Patient was given an additional infusion of IV morphine.  I spoke with the patient about being discharged home versus being admitted to the CDU for continued monitoring, IV fluids, antiemetics and pain control and the patient was requesting to be admitted to the CDU at this time.  I spoke with Miky Boogie NP in CDU and he agreed to accept the patient to the CDU for continued observation and medical management at this time.    -----  ED Disposition       ED Disposition   Decision to Admit    Condition   --    Comment   --             Final diagnoses:   Acute on chronic pancreatitis     Your Follow-Up Providers    Follow-up information has not been specified.       Contact information for after-discharge care    Follow-up information has not been specified.          Your medication list        CONTINUE taking these medications        Instructions Last Dose Given Next Dose Due   fenofibrate 48 MG tablet  Commonly known as: TRICOR      Take 1 tablet by mouth Daily.       fish oil 1000 MG capsule capsule      Take 1 capsule by mouth Daily With Breakfast.       gabapentin 300 MG capsule  Commonly known as: NEURONTIN      Take 1 capsule by  mouth 3 (Three) Times a Day.       glipizide 5 MG ER tablet  Commonly known as: Glucotrol XL      Take 1 tablet by mouth Daily.       glucose blood test strip      Use to check glucose daily.       metFORMIN 500 MG tablet  Commonly known as: GLUCOPHAGE      Take 1 tablet by mouth 2 (Two) Times a Day With Meals.       ondansetron 4 MG tablet  Commonly known as: Zofran      Take 1 tablet by mouth Every 8 (Eight) Hours As Needed for Nausea or Vomiting.       OneTouch UltraSoft 2 Lancets misc      Use 1 Lancet Daily.       polyethylene glycol 17 GM/SCOOP powder  Commonly known as: MIRALAX      Take 17 g by mouth Daily As Needed (Use if senna-docusate is ineffective).

## 2025-01-23 NOTE — H&P
[facility]   HISTORY AND PHYSICAL    Patient Name: Yg Araujo  : 1980  MRN: [id number]  Primary Care Physician:  Tang Rios MD  Date of admission: 2025    Subjective   Subjective     Chief Complaint:     Abdominal Pain        Review of Systems   Constitutional: Negative.    HENT: Negative.     Respiratory: Negative.     Cardiovascular: Negative.    Gastrointestinal:  Positive for abdominal pain.        RUQ   Genitourinary: Negative.    Musculoskeletal: Negative.    Skin: Negative.    Neurological: Negative.    Psychiatric/Behavioral: Negative.          Personal History     Past Medical History:   Diagnosis Date    Alcohol abuse     Diverticulitis     GERD (gastroesophageal reflux disease)     HLD (hyperlipidemia)     HTN (hypertension)     Pancreatitis     Pancreatitis     Polysubstance abuse     Type 2 diabetes mellitus        History reviewed. No pertinent surgical history.    Family History: family history includes Diabetes in his father; Heart disease in his father; Hypertension in his father. Otherwise pertinent FHx was reviewed and not pertinent to current issue.    Social History:  reports that he has been smoking cigarettes. He started smoking about 30 years ago. He has a 45.1 pack-year smoking history. He has never used smokeless tobacco. He reports current alcohol use of about 24.0 standard drinks of alcohol per week. He reports current drug use. Drugs: Marijuana and Cocaine(coke).    Home Medications:  OneTouch UltraSoft 2 Lancets, fenofibrate, fish oil, gabapentin, glipizide, glucose blood, metFORMIN, ondansetron, and polyethylene glycol    Allergies:  No Known Allergies    Objective    Objective     Vitals:   Temp:  [98.5 °F (36.9 °C)] 98.5 °F (36.9 °C)  Heart Rate:  [] 68  Resp:  [20] 20  BP: (152)/(95) 152/95    Physical Exam  Vitals and nursing note reviewed.   Constitutional:       Appearance: Normal appearance.   HENT:      Head: Normocephalic and atraumatic.    Cardiovascular:      Rate and Rhythm: Normal rate and regular rhythm.   Pulmonary:      Effort: Pulmonary effort is normal.      Breath sounds: Normal breath sounds.   Abdominal:      Tenderness: There is abdominal tenderness (RUQ).   Musculoskeletal:         General: Normal range of motion.   Skin:     General: Skin is warm and dry.   Neurological:      General: No focal deficit present.      Mental Status: He is alert and oriented to person, place, and time.   Psychiatric:         Mood and Affect: Mood normal.         Behavior: Behavior normal.         Result Review    Result Review:  I have personally reviewed the results from the time of this admission to 1/23/2025 00:46 EST and agree with these findings:  [x]  Laboratory list / accordion  []  Microbiology  [x]  Radiology  [x]  EKG/Telemetry   []  Cardiology/Vascular   []  Pathology  [x]  Old records  []  Other:      Assessment & Plan   Assessment / Plan     Brief Patient Summary:  Yg Araujo is a 44 y.o. male who presents the emergency department today with complaints of right upper quadrant pain. Patient states he started experiencing this on and off for the past 2 weeks. Patient describes the pain as sharp in quality in the right upper quadrant specifically today. Patient states he has a history of chronic pancreatitis due to alcohol use disorder. Patient states he does not use alcohol anymore. Patient admits to smoking tobacco and marijuana daily. Patient states he tried taking a gabapentin that he has for his diabetic neuropathy and this did not relieve his pain. Patient admits to few episodes of nausea with vomiting today. Patient admits to intermittent constipation and diarrhea at times. Patient denies any urinary symptoms. Patient denies any fevers or chills. Patient states he has not had pain similar to this in the past. Patient states it does not feel like his normal bouts of pancreatitis. Patient is concerned about his liver. Patient rates his  pain an 8 out of 10 on the pain scale currently and sharp in quality. Patient to be admitted to CDU for continued observation and medical management to include fluid resuscitation and pain management for acute alcoholic pancreatitis.    Active Hospital Problems:  Active Hospital Problems    Diagnosis     **Acute alcoholic pancreatitis      Plan:     #Acute alcoholic pancreatitis  #Alcohol abuse  -CT abdomen pelvis: no acute intra-abdominal or intrapelvic process.  Hepatic steatosis  -Lipase 210  -Follow Amylase  -LR @ 150 cc/hr  -NPO x ice chips, advance as tolerated  -PRN analgesia and antiemetics    #T2DM  #Diabetic neuropathy  -FSBG q6 hours, SSI  -Hypoglycemia protocol  -Hold home Metformin, Glipizide, Gabapentin    #Tobacco abuse  -Nicotine patch ordered    #PPX  -SCDs    CODE STATUS:    Level Of Support Discussed With: Patient  Code Status (Patient has no pulse and is not breathing): CPR (Attempt to Resuscitate)  Medical Interventions (Patient has pulse or is breathing): Full Support    Admission Status:  I believe this patient meets observation status.    Miky Boogie, APRN

## 2025-01-24 LAB
GLUCOSE BLDC GLUCOMTR-MCNC: 142 MG/DL (ref 70–130)
GLUCOSE BLDC GLUCOMTR-MCNC: 145 MG/DL (ref 70–130)
GLUCOSE BLDC GLUCOMTR-MCNC: 155 MG/DL (ref 70–130)
GLUCOSE BLDC GLUCOMTR-MCNC: 163 MG/DL (ref 70–130)
GLUCOSE BLDC GLUCOMTR-MCNC: 167 MG/DL (ref 70–130)

## 2025-01-24 PROCEDURE — 25010000002 THIAMINE HCL 200 MG/2ML SOLUTION: Performed by: NURSE PRACTITIONER

## 2025-01-24 PROCEDURE — G0378 HOSPITAL OBSERVATION PER HR: HCPCS

## 2025-01-24 PROCEDURE — 96376 TX/PRO/DX INJ SAME DRUG ADON: CPT

## 2025-01-24 PROCEDURE — 63710000001 INSULIN LISPRO (HUMAN) PER 5 UNITS: Performed by: NURSE PRACTITIONER

## 2025-01-24 PROCEDURE — 99232 SBSQ HOSP IP/OBS MODERATE 35: CPT | Performed by: PEDIATRICS

## 2025-01-24 PROCEDURE — 25810000003 LACTATED RINGERS PER 1000 ML: Performed by: NURSE PRACTITIONER

## 2025-01-24 PROCEDURE — 82948 REAGENT STRIP/BLOOD GLUCOSE: CPT

## 2025-01-24 PROCEDURE — 25010000002 HYDROMORPHONE PER 4 MG: Performed by: NURSE PRACTITIONER

## 2025-01-24 RX ADMIN — FOLIC ACID 1 MG: 1 TABLET ORAL at 08:08

## 2025-01-24 RX ADMIN — HYDROMORPHONE HYDROCHLORIDE 0.5 MG: 1 INJECTION, SOLUTION INTRAMUSCULAR; INTRAVENOUS; SUBCUTANEOUS at 16:14

## 2025-01-24 RX ADMIN — INSULIN LISPRO 2 UNITS: 100 INJECTION, SOLUTION INTRAVENOUS; SUBCUTANEOUS at 17:31

## 2025-01-24 RX ADMIN — THIAMINE HYDROCHLORIDE 200 MG: 100 INJECTION, SOLUTION INTRAMUSCULAR; INTRAVENOUS at 14:16

## 2025-01-24 RX ADMIN — SENNOSIDES AND DOCUSATE SODIUM 2 TABLET: 50; 8.6 TABLET ORAL at 08:07

## 2025-01-24 RX ADMIN — GABAPENTIN 300 MG: 300 CAPSULE ORAL at 21:38

## 2025-01-24 RX ADMIN — NICOTINE 1 PATCH: 21 PATCH, EXTENDED RELEASE TRANSDERMAL at 08:07

## 2025-01-24 RX ADMIN — THIAMINE HYDROCHLORIDE 200 MG: 100 INJECTION, SOLUTION INTRAMUSCULAR; INTRAVENOUS at 21:38

## 2025-01-24 RX ADMIN — TRAMADOL HYDROCHLORIDE 50 MG: 50 TABLET, COATED ORAL at 19:34

## 2025-01-24 RX ADMIN — GABAPENTIN 300 MG: 300 CAPSULE ORAL at 08:07

## 2025-01-24 RX ADMIN — POLYETHYLENE GLYCOL 3350 17 G: 17 POWDER, FOR SOLUTION ORAL at 17:40

## 2025-01-24 RX ADMIN — HYDROMORPHONE HYDROCHLORIDE 0.5 MG: 1 INJECTION, SOLUTION INTRAMUSCULAR; INTRAVENOUS; SUBCUTANEOUS at 12:20

## 2025-01-24 RX ADMIN — HYDROMORPHONE HYDROCHLORIDE 0.5 MG: 1 INJECTION, SOLUTION INTRAMUSCULAR; INTRAVENOUS; SUBCUTANEOUS at 03:31

## 2025-01-24 RX ADMIN — SODIUM CHLORIDE, SODIUM LACTATE, POTASSIUM CHLORIDE, CALCIUM CHLORIDE 75 ML/HR: 20; 30; 600; 310 INJECTION, SOLUTION INTRAVENOUS at 18:13

## 2025-01-24 RX ADMIN — Medication 10 ML: at 21:38

## 2025-01-24 RX ADMIN — FENOFIBRATE 48 MG: 48 TABLET ORAL at 08:07

## 2025-01-24 RX ADMIN — HYDROMORPHONE HYDROCHLORIDE 0.5 MG: 1 INJECTION, SOLUTION INTRAMUSCULAR; INTRAVENOUS; SUBCUTANEOUS at 08:22

## 2025-01-24 RX ADMIN — SODIUM CHLORIDE, SODIUM LACTATE, POTASSIUM CHLORIDE, CALCIUM CHLORIDE 75 ML/HR: 20; 30; 600; 310 INJECTION, SOLUTION INTRAVENOUS at 04:53

## 2025-01-24 RX ADMIN — INSULIN LISPRO 2 UNITS: 100 INJECTION, SOLUTION INTRAVENOUS; SUBCUTANEOUS at 11:53

## 2025-01-24 RX ADMIN — THIAMINE HYDROCHLORIDE 200 MG: 100 INJECTION, SOLUTION INTRAMUSCULAR; INTRAVENOUS at 04:53

## 2025-01-24 NOTE — PLAN OF CARE
Goal Outcome Evaluation:                 Pt is A&O with VSS, NSR on the monitor, and on RA. He complained of pain in his RUQ that was relieved with PRN pain medication. LR is still infusing at 75 mL/hr. CIWA was 0 this shift. He slept well last night. There are no complaints at this time.

## 2025-01-24 NOTE — CASE MANAGEMENT/SOCIAL WORK
Discharge Planning Assessment  [facility]     Patient Name: Yg Araujo  MRN: [id number]  Today's Date: 1/24/2025    Admit Date: 1/22/2025    Plan: discharge plan   Discharge Needs Assessment       Row Name 01/24/25 1303       Living Environment    People in Home alone    Current Living Arrangements home    Primary Care Provided by self    Provides Primary Care For no one    Family Caregiver if Needed parent(s)    Family Caregiver Names Roslyn Waters(mother) or Francoise Loaiza(sister)    Quality of Family Relationships unable to assess    Able to Return to Prior Arrangements yes    Living Arrangement Comments I met with pt at bedside with permission regarding discharge plan. Pt resides in [facility] in a home alone.       Transition Planning    Patient/Family Anticipates Transition to home    Patient/Family Anticipated Services at Transition     Transportation Anticipated family or friend will provide       Discharge Needs Assessment    Readmission Within the Last 30 Days no previous admission in last 30 days    Equipment Currently Used at Home none;glucometer  Denies using any DME    Equipment Needed After Discharge glucometer    Discharge Coordination/Progress Pt confirms that he has [employer] with prescription coverage and uses [facility] on [facility] in [place]. Pt states he is independent with ADLs and uses no DME for mobility. PT is here with alcohol induced acute pancreatitits w/o necrosis or infection. Discharge plan is home and does not anticipate discharge needs. Pt does want to see diabetes educator and I did inform primary nurse. Pt reports he has transportation home. CM will cont to follow                   Discharge Plan       Row Name 01/24/25 1568       Plan    Plan discharge plan    Plan Comments Discharge plan is home and does not anticipate discharge needs. Pt does want to see diabetes educator and I did inform primary nurse. Pt reports he has  transportation home. CM will cont to follow    Final Discharge Disposition Code 01 - home or self-care                  Continued Care and Services - Admitted Since 1/22/2025    No active coordination exists for this encounter.       Expected Discharge Date and Time       Expected Discharge Date Expected Discharge Time    Jan 25, 2025            Demographic Summary       Row Name 01/24/25 1302       General Information    General Information Comments PCP is RUDI BHATIA       Contact Information    Permission Granted to Share Info With     Contact Information Obtained for     Contact Information Comments Roslyn Waters(mother) [phone]                   Functional Status       Row Name 01/24/25 1302       Functional Status    Functional Status Comments Pt reports he is independent with ADLs                   Psychosocial    No documentation.                  Abuse/Neglect    No documentation.                  Legal    No documentation.                  Substance Abuse    No documentation.                  Patient Forms    No documentation.                     Olive Yeager RN

## 2025-01-24 NOTE — PROGRESS NOTES
[facility] Medicine Services  PROGRESS NOTE    Patient Name: Yg Araujo  : 1980  MRN: [id number]    Date of Admission: 2025  Primary Care Physician: Tang Rios MD    Subjective   Subjective     CC:  Abd pain    HPI:  Patient overall is improving, still having some abdominal pain.  Patient has been sipping on some liquids but not eating.  No nausea.      Objective   Objective     Vital Signs:   Temp:  [97.7 °F (36.5 °C)-98.2 °F (36.8 °C)] 97.8 °F (36.6 °C)  Heart Rate:  [58-77] 70  Resp:  [16-18] 16  BP: (105-132)/() 132/100     Physical Exam:  Constitutional: No acute distress, awake, alert  HENT: NCAT, mucous membranes moist  Respiratory: Clear to auscultation bilaterally, respiratory effort normal   Cardiovascular: RRR, no murmurs, rubs, or gallops  Gastrointestinal: Positive bowel sounds, soft, tender in the mid abdomen and right upper quadrant area, nondistended  Musculoskeletal: No bilateral ankle edema  Psychiatric: Appropriate affect, cooperative  Neurologic: Oriented x 3, strength symmetric in all extremities, Cranial Nerves grossly intact to confrontation, speech clear  Skin: No rashes      Results Reviewed:  LAB RESULTS:      Lab 25  0516 25  2245   WBC 7.34 7.12   HEMOGLOBIN 14.5 15.5   HEMATOCRIT 44.5 47.8   PLATELETS 263 282   NEUTROS ABS 2.72 3.17   IMMATURE GRANS (ABS) 0.03 0.03   LYMPHS ABS 3.67* 3.12*   MONOS ABS 0.66 0.61   EOS ABS 0.22 0.15   MCV 88.3 87.5   LACTATE  --  1.6         Lab 25  0516 25  2245   SODIUM 139 137   POTASSIUM 4.3 4.1   CHLORIDE 104 99   CO2 25.8 27.6   ANION GAP 9.2 10.4   BUN 9 12   CREATININE 0.74* 0.95   EGFR 115.3 101.9   GLUCOSE 152* 194*   CALCIUM 8.5* 9.4         Lab 25  0516 25  2245   TOTAL PROTEIN 6.4 6.9   ALBUMIN 3.6 4.0   GLOBULIN 2.8 2.9   ALT (SGPT) <5 <5   AST (SGOT) 16 18   BILIRUBIN 0.8 0.8   ALK PHOS 52 59   AMYLASE  --  113*   LIPASE 247* 210*             Lab  01/23/25  1808 01/23/25  1008   CHOLESTEROL  --  172   LDL CHOL  --  111*   HDL CHOL  --  26*   TRIGLYCERIDES 198* 199*  199*             Brief Urine Lab Results  (Last result in the past 365 days)        Color   Clarity   Blood   Leuk Est   Nitrite   Protein   CREAT   Urine HCG        01/23/25 0014 Yellow   Clear   Negative   Negative   Negative   Negative                   Microbiology Results Abnormal       None            CT Abdomen Pelvis With Contrast    Result Date: 1/22/2025  CT ABDOMEN PELVIS W CONTRAST Date of Exam: 1/22/2025 10:50 PM EST Indication: RUQ Pain. Comparison: 11/26/2024 Technique: Axial CT images were obtained of the abdomen and pelvis following the uneventful intravenous administration of intravenous contrast. Reconstructed coronal and sagittal images were also obtained. Automated exposure control and iterative construction methods were used. Findings: Lung Bases:   The visualized lung bases and lower mediastinal structures are unremarkable. Liver: The liver appears diffusely hypodense.. No focal lesions. Biliary/Gallbladder:  The gallbladder is mildly under distended limiting evaluation. No evidence of stones.. The biliary tree is nondilated. Spleen: Spleen is normal in size and CT density. Pancreas:  Pancreas is normal. There is no evidence of pancreatic mass or peripancreatic fluid. Kidneys:  Kidneys are normal in size. There are no stones or hydronephrosis. Adrenals:  Adrenal glands are unremarkable. Retroperitoneal/Lymph Nodes/Vasculature:  No retroperitoneal adenopathy is identified. Gastrointestinal/Mesentery:  No significant stool burden identified. No evidence of hernia. There is diverticulosis of the left colon. No significant inflammatory changes. Stomach appears unremarkable. The bowel loops are non-dilated without wall thickening or mass. The appendix appears within normal limits. No evidence of obstruction. No free air. No mesenteric fluid collections identified. Bladder:  The  bladder is normal. Genital:   Unremarkable       Bony Structures:   Visualized bony structures are consistent with the patient's age.     Impression: Impression: 1.No acute intra-abdominal or intrapelvic process. 2.Hepatic steatosis. 3.Ancillary findings as described above. Electronically Signed: Santa Jaeger MD  1/22/2025 11:12 PM EST  Workstation ID: [id number]         Current medications:  Scheduled Meds:fenofibrate, 48 mg, Oral, Daily  folic acid, 1 mg, Oral, Daily  gabapentin, 300 mg, Oral, Q12H  insulin lispro, 2-7 Units, Subcutaneous, 4x Daily AC & at Bedtime  nicotine, 1 patch, Transdermal, Q24H  senna-docusate sodium, 2 tablet, Oral, BID  sodium chloride, 10 mL, Intravenous, Q12H  thiamine (B-1) IV, 200 mg, Intravenous, Q8H   Followed by  [START ON 1/29/2025] thiamine, 100 mg, Oral, Daily      Continuous Infusions:lactated ringers, 75 mL/hr, Last Rate: 75 mL/hr (01/24/25 1311)      PRN Meds:.  acetaminophen **OR** acetaminophen **OR** acetaminophen    senna-docusate sodium **AND** polyethylene glycol **AND** bisacodyl **AND** bisacodyl    Calcium Replacement - Follow Nurse / BPA Driven Protocol    dextrose    dextrose    dextrose    diazePAM **OR** diazePAM **OR** diazePAM **OR** diazePAM **OR** diazePAM **OR** diazePAM    glucagon (human recombinant)    HYDROmorphone **AND** naloxone    Magnesium Standard Dose Replacement - Follow Nurse / BPA Driven Protocol    ondansetron ODT **OR** ondansetron    Phosphorus Replacement - Follow Nurse / BPA Driven Protocol    Potassium Replacement - Follow Nurse / BPA Driven Protocol    sodium chloride    Insert Peripheral IV **AND** sodium chloride    sodium chloride    sodium chloride    traMADol    Assessment & Plan   Assessment & Plan     Active Hospital Problems    Diagnosis  POA    **Alcohol induced acute pancreatitis without necrosis or infection [K85.20]  Yes    Intractable pain [R52]  Unknown    Acute alcoholic pancreatitis [K85.20]  Yes    Tobacco use [Z72.0]  Yes     GERD without esophagitis [K21.9]  Yes    Hypertriglyceridemia [E78.1]  Yes    Type 2 diabetes mellitus without complication, without long-term current use of insulin [E11.9]  Yes      Resolved Hospital Problems   No resolved problems to display.        Brief Hospital Course to date:  Yg Araujo is a 44 y.o. male with a past medical history of alcohol abuse, hypertension, diabetes pancreatitis and polysubstance abuse who presents with 2 weeks of abdominal pain and found to have recurrent pancreatitis.     Alcohol induced acute pancreatitis   Hx of polysubstance disorder  -- pain control with ultram and 24 hours of IV dilaudid   -- lipid panel with elevated trig.  -- CIWA protocol, folic acid replacement   --Discussed full abstinence from alcohol with patient and he seemed agreeable to this.  Also discussed cutting back on red meat which seem to be a harder task but reasonable discussion with the patient.       DM  Diabetic neuropathy  -- LDSSI adjust as needed   -- cont home gabapentin bid per patient request      HLD  Hypertriglyceridemia   -- cont tricor      Tobacco abuse   -- cont nicotine patch        Expected Discharge Location and Transportation: home  Expected Discharge   Expected Discharge Date: 1/25/2025; Expected Discharge Time:      VTE Prophylaxis:  Mechanical VTE prophylaxis orders are present.         AM-PAC 6 Clicks Score (PT): 24 (01/24/25 0800)    CODE STATUS:   Code Status and Medical Interventions: CPR (Attempt to Resuscitate); Full Support   Ordered at: 01/23/25 0039     Level Of Support Discussed With:    Patient     Code Status (Patient has no pulse and is not breathing):    CPR (Attempt to Resuscitate)     Medical Interventions (Patient has pulse or is breathing):    Full Support       Rufina Bonilla MD  01/24/25

## 2025-01-24 NOTE — NURSING NOTE
Pt admitted 1/23/25.On room air, A/Ox4, VSS, NSR, ACHS, on continuous LR per order. Pt ambulates ab berny. Pt has c/o RUQ abd pain during shift and received PRN pain medication. Pt showered, new leads and SP02 placed. CIWA= 0.

## 2025-01-24 NOTE — PLAN OF CARE
Goal Outcome Evaluation:         Pt admitted 1/23/25.On room air, A/Ox4, VSS, NSR, ACHS, on continuous LR per order. Pt ambulates ab berny. Pt has c/o RUQ abd pain during shift and received PRN pain medication. Pt showered, new leads and SP02 placed. CIWA= 0.       During shift MD notified regarding elevated lactate x2. New order for IV bolus given.

## 2025-01-25 LAB
GLUCOSE BLDC GLUCOMTR-MCNC: 132 MG/DL (ref 70–130)
GLUCOSE BLDC GLUCOMTR-MCNC: 142 MG/DL (ref 70–130)
GLUCOSE BLDC GLUCOMTR-MCNC: 152 MG/DL (ref 70–130)
GLUCOSE BLDC GLUCOMTR-MCNC: 296 MG/DL (ref 70–130)

## 2025-01-25 PROCEDURE — 82948 REAGENT STRIP/BLOOD GLUCOSE: CPT

## 2025-01-25 PROCEDURE — 99232 SBSQ HOSP IP/OBS MODERATE 35: CPT | Performed by: INTERNAL MEDICINE

## 2025-01-25 PROCEDURE — 25010000002 THIAMINE HCL 200 MG/2ML SOLUTION: Performed by: NURSE PRACTITIONER

## 2025-01-25 PROCEDURE — G0378 HOSPITAL OBSERVATION PER HR: HCPCS

## 2025-01-25 PROCEDURE — 96376 TX/PRO/DX INJ SAME DRUG ADON: CPT

## 2025-01-25 PROCEDURE — 63710000001 INSULIN LISPRO (HUMAN) PER 5 UNITS: Performed by: NURSE PRACTITIONER

## 2025-01-25 RX ORDER — PANTOPRAZOLE SODIUM 40 MG/1
40 TABLET, DELAYED RELEASE ORAL
Status: DISCONTINUED | OUTPATIENT
Start: 2025-01-25 | End: 2025-01-26 | Stop reason: HOSPADM

## 2025-01-25 RX ORDER — ALUMINA, MAGNESIA, AND SIMETHICONE 2400; 2400; 240 MG/30ML; MG/30ML; MG/30ML
15 SUSPENSION ORAL EVERY 6 HOURS
Status: DISCONTINUED | OUTPATIENT
Start: 2025-01-25 | End: 2025-01-26 | Stop reason: HOSPADM

## 2025-01-25 RX ORDER — HYDROMORPHONE HYDROCHLORIDE 2 MG/1
2 TABLET ORAL EVERY 4 HOURS PRN
Status: DISCONTINUED | OUTPATIENT
Start: 2025-01-25 | End: 2025-01-26 | Stop reason: HOSPADM

## 2025-01-25 RX ADMIN — GABAPENTIN 300 MG: 300 CAPSULE ORAL at 08:26

## 2025-01-25 RX ADMIN — HYDROMORPHONE HYDROCHLORIDE 2 MG: 2 TABLET ORAL at 17:53

## 2025-01-25 RX ADMIN — INSULIN LISPRO 2 UNITS: 100 INJECTION, SOLUTION INTRAVENOUS; SUBCUTANEOUS at 08:26

## 2025-01-25 RX ADMIN — ALUMINUM HYDROXIDE, MAGNESIUM HYDROXIDE, DIMETHICONE 15 ML: 400; 400; 40 SUSPENSION ORAL at 22:56

## 2025-01-25 RX ADMIN — TRAMADOL HYDROCHLORIDE 50 MG: 50 TABLET, COATED ORAL at 05:47

## 2025-01-25 RX ADMIN — HYDROMORPHONE HYDROCHLORIDE 2 MG: 2 TABLET ORAL at 13:15

## 2025-01-25 RX ADMIN — THIAMINE HYDROCHLORIDE 200 MG: 100 INJECTION, SOLUTION INTRAMUSCULAR; INTRAVENOUS at 22:55

## 2025-01-25 RX ADMIN — PANTOPRAZOLE SODIUM 40 MG: 40 TABLET, DELAYED RELEASE ORAL at 16:59

## 2025-01-25 RX ADMIN — SENNOSIDES AND DOCUSATE SODIUM 2 TABLET: 50; 8.6 TABLET ORAL at 22:56

## 2025-01-25 RX ADMIN — INSULIN LISPRO 4 UNITS: 100 INJECTION, SOLUTION INTRAVENOUS; SUBCUTANEOUS at 16:59

## 2025-01-25 RX ADMIN — Medication 10 ML: at 08:27

## 2025-01-25 RX ADMIN — FOLIC ACID 1 MG: 1 TABLET ORAL at 08:27

## 2025-01-25 RX ADMIN — FENOFIBRATE 48 MG: 48 TABLET ORAL at 08:26

## 2025-01-25 RX ADMIN — THIAMINE HYDROCHLORIDE 200 MG: 100 INJECTION, SOLUTION INTRAMUSCULAR; INTRAVENOUS at 05:47

## 2025-01-25 RX ADMIN — ALUMINUM HYDROXIDE, MAGNESIUM HYDROXIDE, DIMETHICONE 15 ML: 400; 400; 40 SUSPENSION ORAL at 13:40

## 2025-01-25 RX ADMIN — NICOTINE 1 PATCH: 21 PATCH, EXTENDED RELEASE TRANSDERMAL at 08:28

## 2025-01-25 RX ADMIN — SENNOSIDES AND DOCUSATE SODIUM 2 TABLET: 50; 8.6 TABLET ORAL at 08:27

## 2025-01-25 RX ADMIN — GABAPENTIN 300 MG: 300 CAPSULE ORAL at 22:56

## 2025-01-25 RX ADMIN — HYDROMORPHONE HYDROCHLORIDE 2 MG: 2 TABLET ORAL at 23:42

## 2025-01-25 RX ADMIN — THIAMINE HYDROCHLORIDE 200 MG: 100 INJECTION, SOLUTION INTRAMUSCULAR; INTRAVENOUS at 13:07

## 2025-01-25 NOTE — PLAN OF CARE
Goal Outcome Evaluation:         Patient A+Ox4, has denied SOA on room air.  VSS, NSR via the monitor.  Patient reports his pain is much better today, c/o pain once and PRN Dilaudid PO given with + relief.  PO intake improving.  Possible discharge to home on 1-26-25.  Will continue with current POC.

## 2025-01-25 NOTE — PROGRESS NOTES
[facility] Medicine Services  PROGRESS NOTE    Patient Name: Yg Araujo  : 1980  MRN: [id number]    Date of Admission: 2025  Primary Care Physician: Tang Rios MD    Subjective   Subjective     CC:  Abd pain    HPI:  Patient Was seen and examined this morning.  Still complaining of epigastric abdominal pain.  He is  Tolerating diet well with no acute complaints.  Uncomfortable to go home today.      Objective   Objective     Vital Signs:   Temp:  [97.7 °F (36.5 °C)-98.4 °F (36.9 °C)] 98 °F (36.7 °C)  Heart Rate:  [61-73] 73  Resp:  [14-18] 16  BP: (106-141)/() 118/86     Physical Exam:  General: Comfortable, not in distress, conversant and cooperative  Head: Atraumatic and normocephalic  Eyes: No Icterus. No pallor  Ears:  Ears appear intact with no abnormalities noted  Throat: No oral lesions, no thrush  Neck: Supple, trachea midline  Lungs: Clear to auscultation bilaterally, equal air entry, no wheezing or crackles  Heart:  Normal S1 and S2, no murmur, no gallop, No JVD, no lower extremity swelling  Abdomen:  Soft, epigastric tenderness, no organomegaly, normal bowel sounds, no organomegaly  Extremities: pulses equal bilaterally  Skin: No bleeding, bruising or rash, normal skin turgor and elasticity  Neurologic: Cranial nerves appear intact with no evidence of facial asymmetry, normal motor and sensory functions in all 4 extremities  Psych: Alert and oriented x 3, normal mood and    Results Reviewed:  LAB RESULTS:      Lab 25  0516 25  2245   WBC 7.34 7.12   HEMOGLOBIN 14.5 15.5   HEMATOCRIT 44.5 47.8   PLATELETS 263 282   NEUTROS ABS 2.72 3.17   IMMATURE GRANS (ABS) 0.03 0.03   LYMPHS ABS 3.67* 3.12*   MONOS ABS 0.66 0.61   EOS ABS 0.22 0.15   MCV 88.3 87.5   LACTATE  --  1.6         Lab 25  0516 25  2245   SODIUM 139 137   POTASSIUM 4.3 4.1   CHLORIDE 104 99   CO2 25.8 27.6   ANION GAP 9.2 10.4   BUN 9 12   CREATININE 0.74* 0.95    EGFR 115.3 101.9   GLUCOSE 152* 194*   CALCIUM 8.5* 9.4         Lab 25  0516 25  2245   TOTAL PROTEIN 6.4 6.9   ALBUMIN 3.6 4.0   GLOBULIN 2.8 2.9   ALT (SGPT) <5 <5   AST (SGOT) 16 18   BILIRUBIN 0.8 0.8   ALK PHOS 52 59   AMYLASE  --  113*   LIPASE 247* 210*             Lab 25  1808 25  1008   CHOLESTEROL  --  172   LDL CHOL  --  111*   HDL CHOL  --  26*   TRIGLYCERIDES 198* 199*  199*             Brief Urine Lab Results  (Last result in the past 365 days)        Color   Clarity   Blood   Leuk Est   Nitrite   Protein   CREAT   Urine HCG        25 0014 Yellow   Clear   Negative   Negative   Negative   Negative                   Microbiology Results Abnormal       None            No radiology results from the last 24 hrs        Current medications:  Scheduled Meds:fenofibrate, 48 mg, Oral, Daily  folic acid, 1 mg, Oral, Daily  gabapentin, 300 mg, Oral, Q12H  insulin lispro, 2-7 Units, Subcutaneous, 4x Daily AC & at Bedtime  nicotine, 1 patch, Transdermal, Q24H  pantoprazole, 40 mg, Oral, BID AC  senna-docusate sodium, 2 tablet, Oral, BID  sodium chloride, 10 mL, Intravenous, Q12H  thiamine (B-1) IV, 200 mg, Intravenous, Q8H   Followed by  [START ON 2025] thiamine, 100 mg, Oral, Daily      Continuous Infusions:     PRN Meds:.  acetaminophen **OR** acetaminophen **OR** acetaminophen    senna-docusate sodium **AND** polyethylene glycol **AND** bisacodyl **AND** bisacodyl    Calcium Replacement - Follow Nurse / BPA Driven Protocol    dextrose    dextrose    dextrose    glucagon (human recombinant)    HYDROmorphone    Magnesium Standard Dose Replacement - Follow Nurse / BPA Driven Protocol    [] HYDROmorphone **AND** naloxone    ondansetron ODT **OR** ondansetron    Phosphorus Replacement - Follow Nurse / BPA Driven Protocol    Potassium Replacement - Follow Nurse / BPA Driven Protocol    sodium chloride    Insert Peripheral IV **AND** sodium chloride    sodium chloride     sodium chloride    Assessment & Plan   Assessment & Plan     Active Hospital Problems    Diagnosis  POA    **Alcohol induced acute pancreatitis without necrosis or infection [K85.20]  Yes    Intractable pain [R52]  Unknown    Acute alcoholic pancreatitis [K85.20]  Yes    Tobacco use [Z72.0]  Yes    GERD without esophagitis [K21.9]  Yes    Hypertriglyceridemia [E78.1]  Yes    Type 2 diabetes mellitus without complication, without long-term current use of insulin [E11.9]  Yes      Resolved Hospital Problems   No resolved problems to display.        Brief Hospital Course to date:  Yg Araujo is a 44 y.o. male with a past medical history of alcohol abuse, hypertension, diabetes pancreatitis and polysubstance abuse who presents with 2 weeks of abdominal pain and found to have recurrent pancreatitis.     Alcohol induced acute pancreatitis   Hx of polysubstance disorder  Pain is slowly improving.  Will add as needed p.o. Dilaudid  Currently tolerating GI soft diet  Monitor for another 24 hours.  Potential discharge tomorrow  Empiric BID PPI  Discontinue CIWA protocol    DM  Diabetic neuropathy  LDSSI   Continue home gabapentin bid per patient request      HLD  Continue tricor      Tobacco abuse   Continue nicotine patch        Expected Discharge Location and Transportation: home  Expected Discharge   Expected Discharge Date: 1/26/2025; Expected Discharge Time:      VTE Prophylaxis:  Mechanical VTE prophylaxis orders are present.         AM-PAC 6 Clicks Score (PT): 24 (01/25/25 8847)    CODE STATUS:   Code Status and Medical Interventions: CPR (Attempt to Resuscitate); Full Support   Ordered at: 01/23/25 0039     Level Of Support Discussed With:    Patient     Code Status (Patient has no pulse and is not breathing):    CPR (Attempt to Resuscitate)     Medical Interventions (Patient has pulse or is breathing):    Full Support       Bryce Hernandez MD  01/25/25

## 2025-01-26 ENCOUNTER — READMISSION MANAGEMENT (OUTPATIENT)
Dept: CALL CENTER | Facility: HOSPITAL | Age: 45
End: 2025-01-26
Payer: COMMERCIAL

## 2025-01-26 VITALS
HEART RATE: 67 BPM | TEMPERATURE: 98.2 F | WEIGHT: 209.7 LBS | DIASTOLIC BLOOD PRESSURE: 73 MMHG | SYSTOLIC BLOOD PRESSURE: 100 MMHG | OXYGEN SATURATION: 97 % | HEIGHT: 69 IN | BODY MASS INDEX: 31.06 KG/M2 | RESPIRATION RATE: 16 BRPM

## 2025-01-26 LAB — GLUCOSE BLDC GLUCOMTR-MCNC: 168 MG/DL (ref 70–130)

## 2025-01-26 PROCEDURE — 96376 TX/PRO/DX INJ SAME DRUG ADON: CPT

## 2025-01-26 PROCEDURE — 25010000002 THIAMINE HCL 200 MG/2ML SOLUTION: Performed by: NURSE PRACTITIONER

## 2025-01-26 PROCEDURE — 82948 REAGENT STRIP/BLOOD GLUCOSE: CPT

## 2025-01-26 PROCEDURE — 63710000001 INSULIN LISPRO (HUMAN) PER 5 UNITS: Performed by: NURSE PRACTITIONER

## 2025-01-26 PROCEDURE — 99238 HOSP IP/OBS DSCHRG MGMT 30/<: CPT | Performed by: INTERNAL MEDICINE

## 2025-01-26 PROCEDURE — G0378 HOSPITAL OBSERVATION PER HR: HCPCS

## 2025-01-26 RX ORDER — DIAZEPAM 5 MG/1
5 TABLET ORAL EVERY 8 HOURS PRN
Qty: 20 TABLET | Refills: 0 | Status: SHIPPED | OUTPATIENT
Start: 2025-01-26

## 2025-01-26 RX ORDER — HYDROMORPHONE HYDROCHLORIDE 2 MG/1
2 TABLET ORAL EVERY 6 HOURS PRN
Qty: 30 TABLET | Refills: 0 | Status: SHIPPED | OUTPATIENT
Start: 2025-01-26 | End: 2025-01-26

## 2025-01-26 RX ORDER — GLUCOSAMINE HCL/CHONDROITIN SU 500-400 MG
1 CAPSULE ORAL DAILY
Qty: 100 EACH | Refills: 0 | Status: SHIPPED | OUTPATIENT
Start: 2025-01-26

## 2025-01-26 RX ORDER — ELECTROLYTES/DEXTROSE
1 SOLUTION, ORAL ORAL DAILY
Qty: 30 EACH | Refills: 0 | Status: SHIPPED | OUTPATIENT
Start: 2025-01-26 | End: 2025-02-25

## 2025-01-26 RX ORDER — HYDROMORPHONE HYDROCHLORIDE 4 MG/1
2 TABLET ORAL EVERY 4 HOURS PRN
Qty: 15 TABLET | Refills: 0 | Status: SHIPPED | OUTPATIENT
Start: 2025-01-26 | End: 2025-02-02

## 2025-01-26 RX ADMIN — FOLIC ACID 1 MG: 1 TABLET ORAL at 09:55

## 2025-01-26 RX ADMIN — HYDROMORPHONE HYDROCHLORIDE 2 MG: 2 TABLET ORAL at 05:20

## 2025-01-26 RX ADMIN — HYDROMORPHONE HYDROCHLORIDE 2 MG: 2 TABLET ORAL at 10:12

## 2025-01-26 RX ADMIN — ALUMINUM HYDROXIDE, MAGNESIUM HYDROXIDE, DIMETHICONE 15 ML: 400; 400; 40 SUSPENSION ORAL at 05:21

## 2025-01-26 RX ADMIN — INSULIN LISPRO 2 UNITS: 100 INJECTION, SOLUTION INTRAVENOUS; SUBCUTANEOUS at 09:55

## 2025-01-26 RX ADMIN — PANTOPRAZOLE SODIUM 40 MG: 40 TABLET, DELAYED RELEASE ORAL at 09:55

## 2025-01-26 RX ADMIN — THIAMINE HYDROCHLORIDE 200 MG: 100 INJECTION, SOLUTION INTRAMUSCULAR; INTRAVENOUS at 05:20

## 2025-01-26 RX ADMIN — FENOFIBRATE 48 MG: 48 TABLET ORAL at 09:55

## 2025-01-26 RX ADMIN — GABAPENTIN 300 MG: 300 CAPSULE ORAL at 09:55

## 2025-01-26 RX ADMIN — Medication 10 ML: at 09:56

## 2025-01-26 NOTE — DISCHARGE SUMMARY
[facility] Medicine Services  DISCHARGE SUMMARY    Patient Name: Yg Araujo  : 1980  MRN: [id number]    Date of Admission: 2025 10:01 PM  Date of Discharge:  2025  Primary Care Physician: Tang Rios MD    Consults       No orders found from 2024 to 2025.            Hospital Course     Presenting Problem:     Active Hospital Problems    Diagnosis  POA    **Alcohol pancreatitis [K85.20]  Yes    Intractable pain [R52]  Unknown    Acute alcoholic pancreatitis [K85.20]  Yes    Tobacco use [Z72.0]  Yes    GERD without esophagitis [K21.9]  Yes    Hypertriglyceridemia [E78.1]  Yes    Type 2 diabetes mellitus without complication, without long-term current use of insulin [E11.9]  Yes      Resolved Hospital Problems   No resolved problems to display.          Hospital Course:  Yg Araujo is a 44 y.o. male with a past medical history of alcohol abuse, hypertension, diabetes pancreatitis and polysubstance abuse who presents with 2 weeks of abdominal pain and found to have recurrent pancreatitis.  Managed conservatively with n.p.o., IV fluids, as needed analgesia.  Counseled about the importance to refrain from alcohol and voiced understanding.  Diet advanced and tolerated well.  Discharged in stable condition     Alcohol induced acute pancreatitis   Hx of polysubstance disorder  Pain is slowly improving.  Will add as needed p.o. Dilaudid  Currently tolerating GI soft diet  Has established follow-up with GI as outpatient.     DM2  Diabetic neuropathy  Given his A1c of nearly 11, he was discharged on insulin Lantus 15 units daily.  Prescription provided upon discharge daily follows with his family doctor  Continue home glipizide and metformin  Continue home gabapentin bid per patient request      HLD  Continue tricor      Tobacco abuse   Continue nicotine patch       Discharge Follow Up Recommendations for outpatient labs/diagnostics:  PCP in 1 week  .      Day of Discharge     HPI:   The patient was seen and examined this morning. t. Comfortable in bed.  No abdominal pain.  Able to tolerate diet well.  Agreeable to discharge today.    Vital Signs:   Temp:  [97.8 °F (36.6 °C)-98.5 °F (36.9 °C)] 98.2 °F (36.8 °C)  Heart Rate:  [63-73] 64  Resp:  [16] 16  BP: (100-130)/(67-96) 100/73      Physical Exam:  General: Comfortable, not in distress, conversant and cooperative  Head: Atraumatic and normocephalic  Eyes: No Icterus. No pallor  Ears:  Ears appear intact with no abnormalities noted  Throat: No oral lesions, no thrush  Neck: Supple, trachea midline  Lungs: Clear to auscultation bilaterally, equal air entry, no wheezing or crackles  Heart:  Normal S1 and S2, no murmur, no gallop, No JVD, no lower extremity swelling  Abdomen:  Soft, epigastric tenderness, no organomegaly, normal bowel sounds, no organomegaly  Extremities: pulses equal bilaterally  Skin: No bleeding, bruising or rash, normal skin turgor and elasticity  Neurologic: Cranial nerves appear intact with no evidence of facial asymmetry, normal motor and sensory functions in all 4 extremities  Psych: Alert and oriented x 3, normal mood and      Pertinent  and/or Most Recent Results     LAB RESULTS:      Lab 01/23/25  0516 01/22/25  2245   WBC 7.34 7.12   HEMOGLOBIN 14.5 15.5   HEMATOCRIT 44.5 47.8   PLATELETS 263 282   NEUTROS ABS 2.72 3.17   IMMATURE GRANS (ABS) 0.03 0.03   LYMPHS ABS 3.67* 3.12*   MONOS ABS 0.66 0.61   EOS ABS 0.22 0.15   MCV 88.3 87.5   LACTATE  --  1.6         Lab 01/23/25  0516 01/22/25  2245   SODIUM 139 137   POTASSIUM 4.3 4.1   CHLORIDE 104 99   CO2 25.8 27.6   ANION GAP 9.2 10.4   BUN 9 12   CREATININE 0.74* 0.95   EGFR 115.3 101.9   GLUCOSE 152* 194*   CALCIUM 8.5* 9.4         Lab 01/23/25  0516 01/22/25  2245   TOTAL PROTEIN 6.4 6.9   ALBUMIN 3.6 4.0   GLOBULIN 2.8 2.9   ALT (SGPT) <5 <5   AST (SGOT) 16 18   BILIRUBIN 0.8 0.8   ALK PHOS 52 59   AMYLASE  --  113*   LIPASE 247*  210*             Lab 01/23/25  1808 01/23/25  1008   CHOLESTEROL  --  172   LDL CHOL  --  111*   HDL CHOL  --  26*   TRIGLYCERIDES 198* 199*  199*             Brief Urine Lab Results  (Last result in the past 365 days)        Color   Clarity   Blood   Leuk Est   Nitrite   Protein   CREAT   Urine HCG        01/23/25 0014 Yellow   Clear   Negative   Negative   Negative   Negative                 Microbiology Results (last 10 days)       ** No results found for the last 240 hours. **            CT Abdomen Pelvis With Contrast    Result Date: 1/22/2025  CT ABDOMEN PELVIS W CONTRAST Date of Exam: 1/22/2025 10:50 PM EST Indication: RUQ Pain. Comparison: 11/26/2024 Technique: Axial CT images were obtained of the abdomen and pelvis following the uneventful intravenous administration of intravenous contrast. Reconstructed coronal and sagittal images were also obtained. Automated exposure control and iterative construction methods were used. Findings: Lung Bases:   The visualized lung bases and lower mediastinal structures are unremarkable. Liver: The liver appears diffusely hypodense.. No focal lesions. Biliary/Gallbladder:  The gallbladder is mildly under distended limiting evaluation. No evidence of stones.. The biliary tree is nondilated. Spleen: Spleen is normal in size and CT density. Pancreas:  Pancreas is normal. There is no evidence of pancreatic mass or peripancreatic fluid. Kidneys:  Kidneys are normal in size. There are no stones or hydronephrosis. Adrenals:  Adrenal glands are unremarkable. Retroperitoneal/Lymph Nodes/Vasculature:  No retroperitoneal adenopathy is identified. Gastrointestinal/Mesentery:  No significant stool burden identified. No evidence of hernia. There is diverticulosis of the left colon. No significant inflammatory changes. Stomach appears unremarkable. The bowel loops are non-dilated without wall thickening or mass. The appendix appears within normal limits. No evidence of obstruction. No  free air. No mesenteric fluid collections identified. Bladder:  The bladder is normal. Genital:   Unremarkable       Bony Structures:   Visualized bony structures are consistent with the patient's age.     Impression: 1.No acute intra-abdominal or intrapelvic process. 2.Hepatic steatosis. 3.Ancillary findings as described above. Electronically Signed: Santa Jaeger MD  1/22/2025 11:12 PM EST  Workstation ID: [id number]                 Plan for Follow-up of Pending Labs/Results:     Discharge Details        Discharge Medications        New Medications        Instructions Start Date   diazePAM 5 MG tablet  Commonly known as: Valium   5 mg, Oral, Every 8 Hours PRN      HYDROmorphone 2 MG tablet  Commonly known as: DILAUDID   2 mg, Oral, Every 6 Hours PRN      Insulin Glargine 100 UNIT/ML injection pen  Commonly known as: LANTUS SOLOSTAR   15 Units, Subcutaneous, Daily             Continue These Medications        Instructions Start Date   fenofibrate 48 MG tablet  Commonly known as: TRICOR   48 mg, Oral, Daily      fish oil 1000 MG capsule capsule   1,000 mg, Oral, Daily With Breakfast      gabapentin 300 MG capsule  Commonly known as: NEURONTIN   300 mg, Oral, 3 Times Daily      glipizide 5 MG ER tablet  Commonly known as: Glucotrol XL   5 mg, Oral, Daily      glucose blood test strip   Use to check glucose daily.      metFORMIN 500 MG tablet  Commonly known as: GLUCOPHAGE   500 mg, Oral, 2 Times Daily With Meals      ondansetron 4 MG tablet  Commonly known as: Zofran   4 mg, Oral, Every 8 Hours PRN      OneTouch UltraSoft 2 Lancets misc   1 Lancet, Not Applicable, Daily      polyethylene glycol 17 GM/SCOOP powder  Commonly known as: MIRALAX   17 g, Oral, Daily PRN               No Known Allergies      Discharge Disposition:  Home or Self Care    Diet:  Hospital:  Diet Order   Procedures    Diet: Regular/House, Diabetic, Gastrointestinal; Consistent Carbohydrate; Fat-Restricted; Texture: Regular (IDDSI 7); Fluid  Consistency: Thin (IDDSI 0)            Activity:  As tolerated     Restrictions or Other Recommendations:  None        CODE STATUS:    Code Status and Medical Interventions: CPR (Attempt to Resuscitate); Full Support   Ordered at: 01/23/25 0039     Level Of Support Discussed With:    Patient     Code Status (Patient has no pulse and is not breathing):    CPR (Attempt to Resuscitate)     Medical Interventions (Patient has pulse or is breathing):    Full Support       Future Appointments   Date Time Provider Department Center   3/19/2025  2:00 PM Tang Rios MD [facility]                 Bryce Hernandez MD  01/26/25      Time Spent on Discharge:  I spent  28  minutes on this discharge activity which included: face-to-face encounter with the patient, reviewing the data in the system, coordination of the care with the nursing staff as well as consultants, documentation, and entering orders.

## 2025-01-26 NOTE — OUTREACH NOTE
Prep Survey      Flowsheet Row Responses   [facility] patient discharged from? [place]   Is LACE score < 7 ? No   Eligibility [facility]   Date of Admission 01/22/25   Date of Discharge 01/26/25   Discharge Disposition Home or Self Care   Discharge diagnosis Alcohol induced acuate pancreatitis   Hx of polysubstance disorder   Does the patient have one of the following disease processes/diagnoses(primary or secondary)? Other   Does the patient have Home health ordered? No   Is there a DME ordered? No   Prep survey completed? Yes            MARITZA WOODSON - Registered Nurse

## 2025-01-26 NOTE — PLAN OF CARE
Goal Outcome Evaluation:            Pt AO x4/VSS/98% on RA/NSR on monitor. Pain addressed with PRN meds.   Problem: Adult Inpatient Plan of Care  Goal: Patient-Specific Goal (Individualized)  Outcome: Adequate for Care Transition  Goal: Absence of Hospital-Acquired Illness or Injury  Outcome: Adequate for Care Transition  Intervention: Identify and Manage Fall Risk  Description: Perform standard risk assessment on admission using a validated tool or comprehensive approach appropriate to the patient; reassess fall risk frequently, with change in status or transfer to another level of care.  Communicate risk to interprofessional healthcare team; ensure fall risk visible cue.  Determine need for increased observation, equipment and environmental modification, as well as use of supportive, nonskid footwear.  Adjust safety measures to individual needs and identified risk factors.  Reinforce the importance of active participation with fall risk prevention, safety, and physical activity with the patient and family.  Perform regular intentional rounding to assess need for position change, pain assessment and personal needs, including assistance with toileting.  Recent Flowsheet Documentation  Taken 1/26/2025 1012 by Rachel Perez RN  Safety Promotion/Fall Prevention:   activity supervised   assistive device/personal items within reach   clutter free environment maintained   fall prevention program maintained   nonskid shoes/slippers when out of bed   room organization consistent   safety round/check completed   toileting scheduled  Taken 1/26/2025 0800 by Rachel Perez RN  Safety Promotion/Fall Prevention:   nonskid shoes/slippers when out of bed   safety round/check completed  Intervention: Prevent Skin Injury  Description: Perform a screening for skin injury risk, such as pressure or moisture-associated skin damage on admission and at regular intervals throughout hospital stay.  Keep all areas of skin (especially  folds) clean and dry.  Maintain adequate skin hydration.  Relieve and redistribute pressure and protect bony prominences and skin at risk for injury; implement measures based on patient-specific risk factors.  Match turning and repositioning schedule to clinical condition.  Encourage weight shift frequently; assist with reposition if unable to complete independently.  Float heels off bed; avoid pressure on the Achilles tendon.  Keep skin free from extended contact with medical devices.  Optimize nutrition and hydration.  Encourage functional activity and mobility, as early as tolerated.  Use aids (e.g., slide boards, mechanical lift) during transfer.  Recent Flowsheet Documentation  Taken 1/26/2025 1012 by Rachel Perez RN  Body Position: position changed independently  Skin Protection: incontinence pads utilized  Taken 1/26/2025 0800 by Rachel Perez RN  Body Position: position changed independently  Skin Protection: incontinence pads utilized  Intervention: Prevent Infection  Description: Maintain skin and mucous membrane integrity; promote hand, oral and pulmonary hygiene.  Optimize fluid balance, nutrition, sleep and glycemic control to maximize infection resistance.  Identify potential sources of infection early to prevent or mitigate progression of infection (e.g., wound, lines, devices).  Evaluate ongoing need for invasive devices; remove promptly when no longer indicated.  Review vaccination status.  Recent Flowsheet Documentation  Taken 1/26/2025 1012 by Rachel Perez RN  Infection Prevention:   rest/sleep promoted   single patient room provided  Taken 1/26/2025 0800 by Rachel Perez RN  Infection Prevention:   environmental surveillance performed   rest/sleep promoted   single patient room provided  Goal: Optimal Comfort and Wellbeing  Outcome: Adequate for Care Transition  Intervention: Monitor Pain and Promote Comfort  Description: Assess pain level, treatment efficacy and patient response at  regular intervals using a consistent pain scale.  Consider the presence and impact of preexisting chronic pain.  Encourage patient and caregiver involvement in pain assessment, interventions and safety measures.  Promote activity; balance with sleep and rest to enhance healing.  Recent Flowsheet Documentation  Taken 1/26/2025 1012 by Rachel Perez RN  Pain Management Interventions:   care clustered   pillow support provided  Taken 1/26/2025 0800 by Rachel Perez RN  Pain Management Interventions:   pillow support provided   care clustered  Intervention: Provide Person-Centered Care  Description: Use a family-focused approach to care; encourage support system presence and participation.  Develop trust and rapport by proactively providing information, encouraging questions, addressing concerns and offering reassurance.  Acknowledge emotional response to hospitalization.  Recognize and utilize personal coping strategies and strengths; develop goals via shared decision-making.  Honor spiritual and cultural preferences.  Recent Flowsheet Documentation  Taken 1/26/2025 1012 by Rachel Perez RN  Trust Relationship/Rapport:   care explained   choices provided   questions encouraged   questions answered  Taken 1/26/2025 0800 by Rachel Perez RN  Trust Relationship/Rapport:   care explained   choices provided  Goal: Readiness for Transition of Care  Outcome: Adequate for Care Transition

## 2025-01-27 ENCOUNTER — TRANSITIONAL CARE MANAGEMENT TELEPHONE ENCOUNTER (OUTPATIENT)
Dept: CALL CENTER | Facility: HOSPITAL | Age: 45
End: 2025-01-27
Payer: COMMERCIAL

## 2025-01-27 NOTE — OUTREACH NOTE
Call Center TCM Note      Flowsheet Row Responses   [facility] patient discharged from? [place]   Does the patient have one of the following disease processes/diagnoses(primary or secondary)? Other   TCM attempt successful? Yes   Call start time 1544   Call end time 1547   Discharge diagnosis Alcohol induced acute pancreatitis   Person spoke with today (if not patient) and relationship patient   Meds reviewed with patient/caregiver? Yes   Does the patient have all medications ordered at discharge? Yes   Is the patient taking all medications as directed (includes completed medication regime)? Yes   Medication comments Patient denies any issues with checking his blood sugar and taking the insulin injection   Comments PCP DR Rios. Message routed to office to schedule needed Hospital follow up appt.   Does the patient have an appointment with their PCP within 7-14 days of discharge? No appointments available   Nursing Interventions Routed TCM call to PCP office, PCP office requested to make appointment - message sent   Has home health visited the patient within 72 hours of discharge? N/A   Psychosocial issues? No   Did the patient receive a copy of their discharge instructions? Yes   Nursing interventions Reviewed instructions with patient   What is the patient's perception of their health status since discharge? Improving   Is the patient/caregiver able to teach back signs and symptoms related to disease process for when to call PCP? Yes   TCM call completed? Yes   Call end time 1547   Would this patient benefit from a Referral to Amb Social Work? No   Is the patient interested in additional calls from an ambulatory ? No            Indira Suero RN    1/27/2025, 15:49 EST

## 2025-01-30 ENCOUNTER — OFFICE VISIT (OUTPATIENT)
Dept: FAMILY MEDICINE CLINIC | Facility: CLINIC | Age: 45
End: 2025-01-30
Payer: COMMERCIAL

## 2025-01-30 VITALS
HEIGHT: 69 IN | WEIGHT: 207.3 LBS | HEART RATE: 86 BPM | SYSTOLIC BLOOD PRESSURE: 138 MMHG | OXYGEN SATURATION: 98 % | DIASTOLIC BLOOD PRESSURE: 90 MMHG | BODY MASS INDEX: 30.7 KG/M2

## 2025-01-30 DIAGNOSIS — R52 INTRACTABLE PAIN: ICD-10-CM

## 2025-01-30 DIAGNOSIS — E11.40 TYPE 2 DIABETES MELLITUS WITH DIABETIC NEUROPATHY, WITH LONG-TERM CURRENT USE OF INSULIN: ICD-10-CM

## 2025-01-30 DIAGNOSIS — K85.20 ALCOHOL INDUCED ACUTE PANCREATITIS WITHOUT NECROSIS OR INFECTION: ICD-10-CM

## 2025-01-30 DIAGNOSIS — K21.9 GERD WITHOUT ESOPHAGITIS: Primary | ICD-10-CM

## 2025-01-30 DIAGNOSIS — F19.10 POLYSUBSTANCE ABUSE: ICD-10-CM

## 2025-01-30 DIAGNOSIS — Z09 HOSPITAL DISCHARGE FOLLOW-UP: ICD-10-CM

## 2025-01-30 DIAGNOSIS — Z79.4 TYPE 2 DIABETES MELLITUS WITH DIABETIC NEUROPATHY, WITH LONG-TERM CURRENT USE OF INSULIN: ICD-10-CM

## 2025-01-30 DIAGNOSIS — E78.1 HYPERTRIGLYCERIDEMIA: ICD-10-CM

## 2025-01-30 PROBLEM — E11.9 TYPE 2 DIABETES MELLITUS WITHOUT COMPLICATION, WITHOUT LONG-TERM CURRENT USE OF INSULIN: Status: RESOLVED | Noted: 2022-03-22 | Resolved: 2025-01-30

## 2025-01-30 NOTE — ASSESSMENT & PLAN NOTE
Discussed the importance of abstinence from alcohol as well as cocaine and risk of continuing.  We discussed the applicability of 12-step program.  Directions given to a recovery club near his residence.

## 2025-01-30 NOTE — ASSESSMENT & PLAN NOTE
Diabetes is improving with treatment.   Continue current treatment regimen.  Recommended a Mediterranean style of eating  Regular aerobic exercise.  Discussed ways to avoid symptomatic hypoglycemia.  Discussed foot care.  Reminded to get yearly retinal exam.  Diabetes will be reassessed in 3 months

## 2025-01-30 NOTE — PROGRESS NOTES
Transitional Care Follow Up Visit  Subjective     Yg Araujo is a 44 y.o. male who presents for a transitional care management visit.    Within 48 business hours after discharge our office contacted him via telephone to coordinate his care and needs.      I reviewed and discussed the details of that call along with the discharge summary, hospital problems, inpatient lab results, inpatient diagnostic studies, and consultation reports with Yg.     Current outpatient and discharge medications have been reconciled for the patient.  Reviewed by: Tang Rios MD          1/26/2025     2:59 PM   Date of TCM Phone Call   [facility]   Date of Admission 1/22/2025   Date of Discharge 1/26/2025   Discharge Disposition Home or Self Care     Risk for Readmission (LACE) Score: 7 (1/26/2025  6:00 AM)     **Alcohol pancreatitis [K85.20]   Yes    Intractable pain [R52]   Unknown    Acute alcoholic pancreatitis [K85.20]   Yes    Tobacco use [Z72.0]   Yes    GERD without esophagitis [K21.9]   Yes    Hypertriglyceridemia [E78.1]   Yes    Type 2 diabetes mellitus without complication, without long-term current use of insulin [E11.9]   Yes       Resolved Hospital Problems   No resolved problems to display.     History of Present Illness   Course During Hospital Stay:  Yg Araujo is a 44 y.o. male with a past medical history of alcohol abuse, hypertension, diabetes pancreatitis and polysubstance abuse who presents with 2 weeks of abdominal pain and found to have recurrent pancreatitis.  Managed conservatively with n.p.o., IV fluids, as needed analgesia.  Counseled about the importance to refrain from alcohol and voiced understanding.  Diet advanced and tolerated well.  Discharged in stable condition     Alcohol induced acute pancreatitis   Hx of polysubstance disorder  Pain is slowly improving.  Will add as needed p.o. Dilaudid  Currently tolerating GI soft diet  Has established follow-up with GI  as outpatient.     DM2  Diabetic neuropathy  Given his A1c of nearly 11, he was discharged on insulin Lantus 15 units daily.  Prescription provided upon discharge daily follows with his family doctor  Continue home glipizide and metformin  Continue home gabapentin bid per patient request      HLD  Continue tricor      Tobacco abuse   Continue nicotine patch       Current visit  He has been checking his blood sugars up to 200's. He has cut alcohol and cocaine. Feet burns 9/10 no ulcers. Contiues with polyuria and dispsia. Has not see opth in 2 years.  Since being on insulin his fsbs has been  160 in am highest has been 180. Less pU/PD. Gabapentin has helped his feet makes him drowsy. He has abstained from cocaine and has cut his drinking down to couple beers- non since discharge.  Currrently on 15 units lantus bid.  He denies any abdominal pain nausea or vomiting.  Still needs to see ophthalmology.  Would like help scheduling with diabetic educator.     The following portions of the patient's history were reviewed and updated as appropriate: He  has a past medical history of Alcohol abuse, Diverticulitis, GERD (gastroesophageal reflux disease), HLD (hyperlipidemia), HTN (hypertension), Pancreatitis, Pancreatitis, Polysubstance abuse, and Type 2 diabetes mellitus.  He does not have any pertinent problems on file.  He  has no past surgical history on file.  His family history includes Diabetes in his father; Heart disease in his father; Hypertension in his father.  He  reports that he has been smoking cigarettes. He started smoking about 30 years ago. He has a 45.1 pack-year smoking history. He has never used smokeless tobacco. He reports current alcohol use of about 24.0 standard drinks of alcohol per week. He reports current drug use. Drugs: Marijuana and Cocaine(coke).  Current Outpatient Medications   Medication Sig Dispense Refill    Alcohol Swabs 70 % pads Use 1 each Daily. 100 each 0    diazePAM (Valium) 5 MG  "tablet Take 1 tablet by mouth Every 8 (Eight) Hours As Needed for Anxiety or Sleep. 20 tablet 0    fenofibrate (TRICOR) 48 MG tablet Take 1 tablet by mouth Daily. 90 tablet 2    gabapentin (NEURONTIN) 300 MG capsule Take 1 capsule by mouth 3 (Three) Times a Day. 90 capsule 2    glipizide (Glucotrol XL) 5 MG ER tablet Take 1 tablet by mouth Daily. 90 tablet 2    glucose blood test strip Use to check glucose daily. 100 each 2    HYDROmorphone (Dilaudid) 4 MG tablet Take 0.5 tablets by mouth Every 4 (Four) Hours As Needed for Moderate Pain for up to 7 days. 15 tablet 0    Insulin Glargine (LANTUS SOLOSTAR) 100 UNIT/ML injection pen Inject 15 Units under the skin into the appropriate area as directed 2 (Two) Times a Day. 30 mL 9    Insulin Pen Needle (Pen Needles) 29G X 12MM misc Use 1 Needle Daily for 30 days. 30 each 0    metFORMIN (GLUCOPHAGE) 500 MG tablet Take 1 tablet by mouth 2 (Two) Times a Day With Meals. 180 tablet 2    Omega-3 Fatty Acids (fish oil) 1000 MG capsule capsule Take 1 capsule by mouth Daily With Breakfast. 90 capsule 3    ondansetron (Zofran) 4 MG tablet Take 1 tablet by mouth Every 8 (Eight) Hours As Needed for Nausea or Vomiting. 30 tablet 0    OneTouch UltraSoft 2 Lancets misc Use 1 Lancet Daily. 100 each 0    polyethylene glycol (MIRALAX) 17 GM/SCOOP powder Take 17 g by mouth Daily As Needed (Use if senna-docusate is ineffective). 510 g 0     No current facility-administered medications for this visit.     He has No Known Allergies..    Review of Systems   Constitutional: Negative.    Cardiovascular: Negative.    Gastrointestinal: Negative.        Objective   /90   Pulse 86   Ht 175.3 cm (69.02\")   Wt 94 kg (207 lb 4.8 oz)   SpO2 98%   BMI 30.60 kg/m²   Physical Exam  Vitals and nursing note reviewed.   Constitutional:       General: He is not in acute distress.     Appearance: He is well-developed. He is not diaphoretic.   HENT:      Head: Normocephalic and atraumatic.      Right " Ear: External ear normal.      Left Ear: External ear normal.      Mouth/Throat:      Pharynx: No oropharyngeal exudate.   Eyes:      General: No scleral icterus.        Right eye: No discharge.      Conjunctiva/sclera: Conjunctivae normal.   Neck:      Thyroid: No thyromegaly.      Vascular: No JVD.      Trachea: No tracheal deviation.   Cardiovascular:      Rate and Rhythm: Normal rate and regular rhythm.      Pulses:           Dorsalis pedis pulses are 2+ on the right side and 2+ on the left side.        Posterior tibial pulses are 2+ on the right side and 2+ on the left side.      Heart sounds: Normal heart sounds.      Comments: PMI nondisplaced  Pulmonary:      Effort: Pulmonary effort is normal.      Breath sounds: Normal breath sounds. No wheezing or rales.   Abdominal:      General: Bowel sounds are normal.      Palpations: Abdomen is soft.      Tenderness: There is no abdominal tenderness. There is no guarding or rebound.   Musculoskeletal:      Cervical back: Normal range of motion and neck supple.   Feet:      Right foot:      Protective Sensation: 5 sites tested.  3 sites sensed.      Skin integrity: Skin integrity normal.      Left foot:      Protective Sensation: 5 sites tested.  3 sites sensed.      Skin integrity: Skin integrity normal.   Lymphadenopathy:      Cervical: No cervical adenopathy.   Skin:     General: Skin is warm and dry.      Capillary Refill: Capillary refill takes less than 2 seconds.      Coloration: Skin is not pale.      Findings: No rash.   Neurological:      Mental Status: He is alert and oriented to person, place, and time.      Motor: No abnormal muscle tone.      Coordination: Coordination normal.   Psychiatric:         Judgment: Judgment normal.         Assessment & Plan   Diagnoses and all orders for this visit:    1. GERD without esophagitis (Primary)    2. Alcohol pancreatitis    3. Intractable pain    4. Polysubstance abuse  Assessment & Plan:  Discussed the importance  of abstinence from alcohol as well as cocaine and risk of continuing.  We discussed the applicability of 12-step program.  Directions given to a recovery club near his residence.      5. Hypertriglyceridemia  Assessment & Plan:    continue Tricor will improve on insulin as well.      6. Type 2 diabetes mellitus with diabetic neuropathy, with long-term current use of insulin  Assessment & Plan:  Diabetes is improving with treatment.   Continue current treatment regimen.  Recommended a Mediterranean style of eating  Regular aerobic exercise.  Discussed ways to avoid symptomatic hypoglycemia.  Discussed foot care.  Reminded to get yearly retinal exam.  Diabetes will be reassessed in 3 months    Orders:  -     Ambulatory Referral to Diabetic Education    7. Hospital discharge follow-up    Other orders  -     Insulin Glargine (LANTUS SOLOSTAR) 100 UNIT/ML injection pen; Inject 15 Units under the skin into the appropriate area as directed 2 (Two) Times a Day.  Dispense: 30 mL; Refill: 9

## 2025-03-19 ENCOUNTER — OFFICE VISIT (OUTPATIENT)
Dept: FAMILY MEDICINE CLINIC | Facility: CLINIC | Age: 45
End: 2025-03-19

## 2025-03-19 VITALS
SYSTOLIC BLOOD PRESSURE: 132 MMHG | HEIGHT: 69 IN | DIASTOLIC BLOOD PRESSURE: 84 MMHG | HEART RATE: 74 BPM | BODY MASS INDEX: 32.35 KG/M2 | OXYGEN SATURATION: 95 % | WEIGHT: 218.4 LBS

## 2025-03-19 DIAGNOSIS — Z91.89 AT RISK FOR APNEA: ICD-10-CM

## 2025-03-19 DIAGNOSIS — Z79.4 TYPE 2 DIABETES MELLITUS WITH DIABETIC NEUROPATHY, WITH LONG-TERM CURRENT USE OF INSULIN: Primary | ICD-10-CM

## 2025-03-19 DIAGNOSIS — F19.10 POLYSUBSTANCE ABUSE: ICD-10-CM

## 2025-03-19 DIAGNOSIS — F10.10 ALCOHOL ABUSE: ICD-10-CM

## 2025-03-19 DIAGNOSIS — K21.9 GERD WITHOUT ESOPHAGITIS: ICD-10-CM

## 2025-03-19 DIAGNOSIS — E11.40 TYPE 2 DIABETES MELLITUS WITH DIABETIC NEUROPATHY, WITH LONG-TERM CURRENT USE OF INSULIN: Primary | ICD-10-CM

## 2025-03-19 DIAGNOSIS — E78.1 HYPERTRIGLYCERIDEMIA: ICD-10-CM

## 2025-03-19 DIAGNOSIS — Z00.00 ANNUAL PHYSICAL EXAM: ICD-10-CM

## 2025-03-19 LAB
EXPIRATION DATE: ABNORMAL
HBA1C MFR BLD: 8.2 % (ref 4.5–5.7)
Lab: ABNORMAL

## 2025-03-19 NOTE — PROGRESS NOTES
Yg Arauoj  1980  [id number]  Patient Care Team:  Tang Rios MD as PCP - General (Internal Medicine)    Yg Araujo is a 45 y.o. male who is here today for his annual physical   This patient is accompanied by his self who contributes to the history of his care.    Chief Complaint:    Chief Complaint   Patient presents with    Annual Exam     F/u for diabetes and neuropathy         History of Present Illness:     Yg is here for his annual exam. He has been checking his blood sugars up to 200's. He has cut alcohol and cocaine. Feet burns 9/10 no ulcers. Contiues with polyuria and dispsia. Has not see opth in 2 years.  Since being on insulin his fsbs has been  160 in am highest has been 180. Less pU/PD. Gabapentin has helped his feet makes him drowsy. He has abstained from cocaine and has cut his drinking down to couple beers- non since discharge. Currently alcohol use- nearly relapse with the killing of his 15 yearold stepson- ( witnessed by his daughter)    Remians on insulin glargine BID  glucotrol and metformin ( 500 mg bid)    Currrently on 15 units lantus bid.  He denies any abdominal pain nausea or vomiting.  Still needs to see ophthalmology.  Would like help scheduling with diabetic educaton.  He has yet to see ophthalmology.  Continues on fenofibrate however has stopped his fish oil. He denies nay hypoglycemia. He stays fatigued. He is unrested. 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 He retires at 10-12 at night. He its snooze multiple times. He  will urinate a couple times at night.     He due next year for screening colonoscopy.  He admits to needing to see the dentist.  Safety measures were discussed.  Past Medical History:   Diagnosis Date    Alcohol abuse     Diverticulitis     GERD (gastroesophageal reflux disease)     HLD (hyperlipidemia)     HTN (hypertension)     Pancreatitis     Pancreatitis     Polysubstance abuse     Type 2  "diabetes mellitus        No past surgical history on file.     Family History   Problem Relation Age of Onset    Hypertension Father     Heart disease Father     Diabetes Father        Social History     Socioeconomic History    Marital status: Single   Tobacco Use    Smoking status: Every Day     Current packs/day: 1.50     Average packs/day: 1.5 packs/day for 30.2 years (45.3 ttl pk-yrs)     Types: Cigarettes     Start date: 1/1/1995    Smokeless tobacco: Never   Vaping Use    Vaping status: Never Used   Substance and Sexual Activity    Alcohol use: Yes     Alcohol/week: 24.0 standard drinks of alcohol     Types: 24 Cans of beer per week     Comment: 2-4 beers a week    Drug use: Yes     Types: Marijuana, Cocaine(coke)     Comment: daily- Hasn't done cocaine since beginning of April 2017    Sexual activity: Defer       No Known Allergies    Depression: PHQ-2 Depression Screening    Little interest or pleasure in doing things? Not at all   Feeling down, depressed, or hopeless? Not at all   PHQ-2 Total Score 0       Immunization History   Administered Date(s) Administered    Hep B, Unspecified 07/02/1996, 08/28/1996    Td (TDVAX) 07/02/1996       Review of Systems:    Review of Systems   Constitutional:  Positive for fatigue.   HENT:          Snore     Respiratory: Negative.     Cardiovascular: Negative.    Gastrointestinal: Negative.    Endocrine: Negative.    Genitourinary: Negative.    Neurological:         Bilateral leg feet numbness     Psychiatric/Behavioral:  Positive for sleep disturbance.        Vitals:    03/19/25 1403   BP: 132/84   Pulse: 74   SpO2: 95%   Weight: 99.1 kg (218 lb 6.4 oz)   Height: 175.3 cm (69.02\")     Body mass index is 32.24 kg/m².      Current Outpatient Medications:     Alcohol Swabs 70 % pads, Use 1 each Daily., Disp: 100 each, Rfl: 0    fenofibrate (TRICOR) 48 MG tablet, Take 1 tablet by mouth Daily., Disp: 90 tablet, Rfl: 2    gabapentin (NEURONTIN) 300 MG capsule, Take 1 capsule " by mouth 3 (Three) Times a Day., Disp: 90 capsule, Rfl: 2    glipizide (Glucotrol XL) 5 MG ER tablet, Take 1 tablet by mouth Daily., Disp: 90 tablet, Rfl: 2    glucose blood test strip, Use to check glucose daily., Disp: 100 each, Rfl: 2    Insulin Glargine (LANTUS SOLOSTAR) 100 UNIT/ML injection pen, Inject 15 Units under the skin into the appropriate area as directed 2 (Two) Times a Day., Disp: 30 mL, Rfl: 9    metFORMIN (GLUCOPHAGE) 500 MG tablet, Take 1 tablet by mouth 2 (Two) Times a Day With Meals., Disp: 180 tablet, Rfl: 2    ondansetron (Zofran) 4 MG tablet, Take 1 tablet by mouth Every 8 (Eight) Hours As Needed for Nausea or Vomiting., Disp: 30 tablet, Rfl: 0    OneTouch UltraSoft 2 Lancets misc, Use 1 Lancet Daily., Disp: 100 each, Rfl: 0    Omega-3 Fatty Acids (fish oil) 1000 MG capsule capsule, Take 1 capsule by mouth Daily With Breakfast. (Patient not taking: Reported on 3/19/2025), Disp: 90 capsule, Rfl: 3    Physical Exam:    Physical Exam  Vitals reviewed.   Constitutional:       Appearance: He is well-developed.   HENT:      Head: Normocephalic and atraumatic.      Mouth/Throat:      Comments: Class 4 airway  Eyes:      Conjunctiva/sclera: Conjunctivae normal.   Neck:      Vascular: No JVD.   Cardiovascular:      Rate and Rhythm: Normal rate and regular rhythm.      Pulses:           Dorsalis pedis pulses are 2+ on the right side and 2+ on the left side.        Posterior tibial pulses are 2+ on the right side and 2+ on the left side.      Heart sounds: Normal heart sounds. No murmur heard.     No friction rub. No gallop.   Pulmonary:      Effort: Pulmonary effort is normal. No respiratory distress.      Breath sounds: Normal breath sounds. No wheezing or rales.   Chest:      Chest wall: No tenderness.   Abdominal:      Palpations: Abdomen is soft.      Tenderness: There is no abdominal tenderness.   Musculoskeletal:         General: Normal range of motion.   Feet:      Right foot:      Protective  Sensation: 5 sites tested.   1 site sensed.     Skin integrity: Skin integrity normal.      Left foot:      Protective Sensation: 5 sites tested.   1 site sensed.     Skin integrity: Skin integrity normal.   Skin:     General: Skin is warm and dry.   Neurological:      Mental Status: He is alert and oriented to person, place, and time.   Psychiatric:         Mood and Affect: Mood normal.         Behavior: Behavior normal.         Procedures    Results Review:    I reviewed the patient's new clinical results.  1C is improved to 8.2    Assessment/Plan:    Problem List Items Addressed This Visit       Hypertriglyceridemia    Relevant Medications    Omega-3 Fatty Acids (fish oil) 1000 MG capsule capsule    fenofibrate (TRICOR) 48 MG tablet    Other Relevant Orders    Lipid Panel    Alcohol abuse    GERD without esophagitis    Polysubstance abuse    Type 2 diabetes mellitus with diabetic neuropathy, with long-term current use of insulin - Primary    Relevant Medications    metFORMIN (GLUCOPHAGE) 500 MG tablet    glipizide (Glucotrol XL) 5 MG ER tablet    Insulin Glargine (LANTUS SOLOSTAR) 100 UNIT/ML injection pen    Other Relevant Orders    Microalbumin / Creatinine Urine Ratio - Urine, Clean Catch    POC Glycosylated Hemoglobin (Hb A1C) (Completed)     Other Visit Diagnoses         Annual physical exam        Relevant Orders    Comprehensive Metabolic Panel    CBC (No Diff)    TSH Rfx On Abnormal To Free T4      At risk for apnea        Relevant Orders    Ambulatory Referral to Sleep Medicine        Misael Ronquillo for remaining abstinent of alcohol.    Plan of care was reviewed with patient at the conclusion of today's visit. Counseled patient with regards to good nutrition and diet. Maintaining a healthy lifestyle including exercise and physical activities. Spoke with patient on ways to reduce stress, getting adequate sleep and injury prevention.  Discussed prostate cancer screening, colon cancer screening including  benefit of early detection and potential need for follow-up. Patient agrees to screenings today. Annual dental and eye exams were encouraged. Encouraged patient to continue to follow up with annual immunizations.     Return in about 3 months (around 6/19/2025).    Tang Rios MD      Please note than portions of this note were completed wt a Voice Recognition Program

## 2025-04-21 ENCOUNTER — OFFICE VISIT (OUTPATIENT)
Dept: FAMILY MEDICINE CLINIC | Facility: CLINIC | Age: 45
End: 2025-04-21
Payer: MEDICAID

## 2025-04-21 ENCOUNTER — HOSPITAL ENCOUNTER (EMERGENCY)
Facility: HOSPITAL | Age: 45
Discharge: HOME | End: 2025-04-21
Attending: EMERGENCY MEDICINE | Admitting: EMERGENCY MEDICINE
Payer: MEDICAID

## 2025-04-21 VITALS
WEIGHT: 218 LBS | TEMPERATURE: 96.8 F | HEIGHT: 69 IN | HEART RATE: 62 BPM | BODY MASS INDEX: 32.29 KG/M2 | DIASTOLIC BLOOD PRESSURE: 84 MMHG | OXYGEN SATURATION: 98 % | SYSTOLIC BLOOD PRESSURE: 130 MMHG

## 2025-04-21 VITALS
TEMPERATURE: 98.6 F | SYSTOLIC BLOOD PRESSURE: 148 MMHG | DIASTOLIC BLOOD PRESSURE: 94 MMHG | HEIGHT: 69 IN | HEART RATE: 72 BPM | OXYGEN SATURATION: 95 % | BODY MASS INDEX: 31.1 KG/M2 | WEIGHT: 210 LBS | RESPIRATION RATE: 12 BRPM

## 2025-04-21 DIAGNOSIS — M79.671 ACUTE FOOT PAIN, RIGHT: Primary | ICD-10-CM

## 2025-04-21 DIAGNOSIS — R22.41 LOCALIZED SWELLING OF RIGHT FOOT: ICD-10-CM

## 2025-04-21 DIAGNOSIS — Z51.89 ENCOUNTER FOR WOUND RE-CHECK: ICD-10-CM

## 2025-04-21 DIAGNOSIS — M79.671 RIGHT FOOT PAIN: ICD-10-CM

## 2025-04-21 DIAGNOSIS — T30.0 BURN: Primary | ICD-10-CM

## 2025-04-21 LAB
ALBUMIN SERPL-MCNC: 4.2 G/DL (ref 3.5–5.2)
ALBUMIN/GLOB SERPL: 1.4 G/DL
ALP SERPL-CCNC: 66 U/L (ref 39–117)
ALT SERPL W P-5'-P-CCNC: 15 U/L (ref 1–41)
ANION GAP SERPL CALCULATED.3IONS-SCNC: 11 MMOL/L (ref 5–15)
AST SERPL-CCNC: 22 U/L (ref 1–40)
BASOPHILS # BLD AUTO: 0.05 10*3/MM3 (ref 0–0.2)
BASOPHILS NFR BLD AUTO: 0.6 % (ref 0–1.5)
BILIRUB SERPL-MCNC: 0.8 MG/DL (ref 0–1.2)
BUN SERPL-MCNC: 13 MG/DL (ref 6–20)
BUN/CREAT SERPL: 28.3 (ref 7–25)
CALCIUM SPEC-SCNC: 8.4 MG/DL (ref 8.6–10.5)
CHLORIDE SERPL-SCNC: 101 MMOL/L (ref 98–107)
CO2 SERPL-SCNC: 23 MMOL/L (ref 22–29)
CREAT SERPL-MCNC: 0.46 MG/DL (ref 0.76–1.27)
D-LACTATE SERPL-SCNC: 1.2 MMOL/L (ref 0.5–2)
DEPRECATED RDW RBC AUTO: 41.9 FL (ref 37–54)
EGFRCR SERPLBLD CKD-EPI 2021: 132.3 ML/MIN/1.73
EOSINOPHIL # BLD AUTO: 0.2 10*3/MM3 (ref 0–0.4)
EOSINOPHIL NFR BLD AUTO: 2.4 % (ref 0.3–6.2)
ERYTHROCYTE [DISTWIDTH] IN BLOOD BY AUTOMATED COUNT: 13.2 % (ref 12.3–15.4)
GLOBULIN UR ELPH-MCNC: 2.9 GM/DL
GLUCOSE SERPL-MCNC: 216 MG/DL (ref 65–99)
HCT VFR BLD AUTO: 44.3 % (ref 37.5–51)
HGB BLD-MCNC: 14.8 G/DL (ref 13–17.7)
IMM GRANULOCYTES # BLD AUTO: 0.07 10*3/MM3 (ref 0–0.05)
IMM GRANULOCYTES NFR BLD AUTO: 0.9 % (ref 0–0.5)
LYMPHOCYTES # BLD AUTO: 3.72 10*3/MM3 (ref 0.7–3.1)
LYMPHOCYTES NFR BLD AUTO: 45.4 % (ref 19.6–45.3)
MCH RBC QN AUTO: 29.1 PG (ref 26.6–33)
MCHC RBC AUTO-ENTMCNC: 33.4 G/DL (ref 31.5–35.7)
MCV RBC AUTO: 87.2 FL (ref 79–97)
MONOCYTES # BLD AUTO: 0.63 10*3/MM3 (ref 0.1–0.9)
MONOCYTES NFR BLD AUTO: 7.7 % (ref 5–12)
NEUTROPHILS NFR BLD AUTO: 3.52 10*3/MM3 (ref 1.7–7)
NEUTROPHILS NFR BLD AUTO: 43 % (ref 42.7–76)
NRBC BLD AUTO-RTO: 0 /100 WBC (ref 0–0.2)
PLATELET # BLD AUTO: 260 10*3/MM3 (ref 140–450)
PMV BLD AUTO: 10.5 FL (ref 6–12)
POTASSIUM SERPL-SCNC: 4.2 MMOL/L (ref 3.5–5.2)
PROT SERPL-MCNC: 7.1 G/DL (ref 6–8.5)
RBC # BLD AUTO: 5.08 10*6/MM3 (ref 4.14–5.8)
SODIUM SERPL-SCNC: 135 MMOL/L (ref 136–145)
WBC NRBC COR # BLD AUTO: 8.19 10*3/MM3 (ref 3.4–10.8)

## 2025-04-21 PROCEDURE — 99213 OFFICE O/P EST LOW 20 MIN: CPT

## 2025-04-21 PROCEDURE — 83605 ASSAY OF LACTIC ACID: CPT | Performed by: EMERGENCY MEDICINE

## 2025-04-21 PROCEDURE — 80053 COMPREHEN METABOLIC PANEL: CPT | Performed by: EMERGENCY MEDICINE

## 2025-04-21 PROCEDURE — 99283 EMERGENCY DEPT VISIT LOW MDM: CPT

## 2025-04-21 PROCEDURE — 87040 BLOOD CULTURE FOR BACTERIA: CPT | Performed by: EMERGENCY MEDICINE

## 2025-04-21 PROCEDURE — 36415 COLL VENOUS BLD VENIPUNCTURE: CPT

## 2025-04-21 PROCEDURE — 85025 COMPLETE CBC W/AUTO DIFF WBC: CPT | Performed by: EMERGENCY MEDICINE

## 2025-04-21 RX ORDER — IBUPROFEN 600 MG/1
600 TABLET, FILM COATED ORAL ONCE
Status: COMPLETED | OUTPATIENT
Start: 2025-04-21 | End: 2025-04-21

## 2025-04-21 RX ORDER — IBUPROFEN 600 MG/1
600 TABLET, FILM COATED ORAL EVERY 6 HOURS PRN
Qty: 12 TABLET | Refills: 0 | Status: SHIPPED | OUTPATIENT
Start: 2025-04-21

## 2025-04-21 RX ORDER — CEPHALEXIN 500 MG/1
500 CAPSULE ORAL 4 TIMES DAILY
Qty: 28 CAPSULE | Refills: 0 | Status: SHIPPED | OUTPATIENT
Start: 2025-04-21 | End: 2025-04-28

## 2025-04-21 RX ADMIN — CEPHALEXIN 500 MG: 250 CAPSULE ORAL at 15:46

## 2025-04-21 RX ADMIN — IBUPROFEN 600 MG: 600 TABLET, FILM COATED ORAL at 15:46

## 2025-04-21 NOTE — DISCHARGE INSTRUCTIONS
Keep the wound clean with soap and water.    Apply triple antibiotic ointment twice a day after wound has been cleaned.     Elevate right leg above heart level and apply ice to help with pain.    Take ibuprofen for additional pain control.    Take antibiotics as prescribed until completion.

## 2025-04-21 NOTE — Clinical Note
[facility] EMERGENCY DEPARTMENT  [address]  [facility] [address]  Phone: [phone]    Yg Araujo was seen and treated in our emergency department on 4/21/2025.  He may return to work on 04/23/2025.         Thank you for choosing [facility].    Hubert Verdin MD
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
## 2025-04-26 LAB
BACTERIA SPEC AEROBE CULT: NORMAL
BACTERIA SPEC AEROBE CULT: NORMAL